# Patient Record
Sex: FEMALE | Race: WHITE | Employment: OTHER | URBAN - METROPOLITAN AREA
[De-identification: names, ages, dates, MRNs, and addresses within clinical notes are randomized per-mention and may not be internally consistent; named-entity substitution may affect disease eponyms.]

---

## 2019-05-15 ENCOUNTER — OFFICE VISIT (OUTPATIENT)
Dept: GASTROENTEROLOGY | Facility: CLINIC | Age: 74
End: 2019-05-15
Payer: COMMERCIAL

## 2019-05-15 ENCOUNTER — TELEPHONE (OUTPATIENT)
Dept: GASTROENTEROLOGY | Facility: CLINIC | Age: 74
End: 2019-05-15

## 2019-05-15 VITALS
HEART RATE: 86 BPM | BODY MASS INDEX: 34.85 KG/M2 | DIASTOLIC BLOOD PRESSURE: 52 MMHG | HEIGHT: 58 IN | SYSTOLIC BLOOD PRESSURE: 108 MMHG | WEIGHT: 166 LBS

## 2019-05-15 DIAGNOSIS — R10.9 LEFT SIDED ABDOMINAL PAIN: ICD-10-CM

## 2019-05-15 DIAGNOSIS — K21.9 GASTROESOPHAGEAL REFLUX DISEASE, ESOPHAGITIS PRESENCE NOT SPECIFIED: ICD-10-CM

## 2019-05-15 DIAGNOSIS — R10.9 LEFT SIDED ABDOMINAL PAIN: Primary | ICD-10-CM

## 2019-05-15 DIAGNOSIS — R74.8 ABNORMAL SERUM LEVEL OF ALKALINE PHOSPHATASE: ICD-10-CM

## 2019-05-15 DIAGNOSIS — R19.5 OCCULT BLOOD POSITIVE STOOL: ICD-10-CM

## 2019-05-15 DIAGNOSIS — R19.5 POSITIVE OCCULT STOOL BLOOD TEST: Primary | ICD-10-CM

## 2019-05-15 PROCEDURE — 99203 OFFICE O/P NEW LOW 30 MIN: CPT | Performed by: INTERNAL MEDICINE

## 2019-05-15 RX ORDER — TRAVOPROST 0.004 %
DROPS OPHTHALMIC (EYE)
Refills: 0 | COMMUNITY
Start: 2019-05-08

## 2019-05-15 RX ORDER — LISINOPRIL AND HYDROCHLOROTHIAZIDE 25; 20 MG/1; MG/1
TABLET ORAL
Refills: 0 | COMMUNITY
Start: 2018-12-18 | End: 2019-08-19

## 2019-05-15 RX ORDER — POLYETHYLENE GLYCOL 3350, SODIUM CHLORIDE, POTASSIUM CHLORIDE, SODIUM BICARBONATE, AND SODIUM SULFATE 240; 5.84; 2.98; 6.72; 22.72 G/4L; G/4L; G/4L; G/4L; G/4L
POWDER, FOR SOLUTION ORAL
Qty: 1 BOTTLE | Refills: 0 | Status: SHIPPED | OUTPATIENT
Start: 2019-05-15 | End: 2019-08-19 | Stop reason: ALTCHOICE

## 2019-05-15 NOTE — PROGRESS NOTES
HPI:    Patient ID: Roger Camarena is a 72year old woman with elevated BMI 34.7, history diabetes and dyslipidemia who is referred to us by Dr. Keith Bernabe of the [facility] practice regarding abdominal pain and occult blood in the stool.     Ms. Corona Comes 81 MG Oral Tab Take 81 mg by mouth daily.  Disp:  Rfl:    PEG 3350-KCl-NaBcb-NaCl-NaSulf (PEG 3350/ELECTROLYTES) 240 g Oral Recon Soln Take as directed according to colonoscopy instructions Disp: 1 Bottle Rfl: 0     Allergies:No Known Allergies  Imaging: No reviewed above. 2.  I recommended EGD and colonoscopy examination with possible biopsy, possible polypectomy. We discussed sedation options of conscious sedation versus MAC anesthesia, and agreed on MAC anesthesia.  We discussed the nature and risks of E

## 2019-05-15 NOTE — TELEPHONE ENCOUNTER
Scheduled for:  Colonoscopy 10118 / EGD 29492  Provider Name: Dr. Elida Mares  Date:  5/29/19  Location:  [address] [place] [address]  Sedation:  MAC  Time:  2:30 pm, arrival 1:30 pm  Prep: Golytely  Meds/Allergies Reconciled?:  Physician reviewed  Diagnosis with codes:  Positive occult

## 2019-05-15 NOTE — PATIENT INSTRUCTIONS
Schedule EGD & colonoscopy exam at [facility])/ KATHERINE    This patient IS? appropriate for the [facility] endoscopy center. Body mass index is 34.69 kg/m².     MAC anesthesia     Golytely (PEG) 4L bowel prep    Dx = o

## 2019-05-29 ENCOUNTER — ANESTHESIA EVENT (OUTPATIENT)
Dept: ENDOSCOPY | Age: 74
End: 2019-05-29
Attending: INTERNAL MEDICINE
Payer: MEDICARE

## 2019-05-29 ENCOUNTER — HOSPITAL ENCOUNTER (OUTPATIENT)
Age: 74
Setting detail: HOSPITAL OUTPATIENT SURGERY
Discharge: HOME | End: 2019-05-29
Attending: INTERNAL MEDICINE | Admitting: INTERNAL MEDICINE
Payer: MEDICARE

## 2019-05-29 ENCOUNTER — ANESTHESIA (OUTPATIENT)
Dept: ENDOSCOPY | Age: 74
End: 2019-05-29
Attending: INTERNAL MEDICINE
Payer: MEDICARE

## 2019-05-29 VITALS
DIASTOLIC BLOOD PRESSURE: 58 MMHG | OXYGEN SATURATION: 98 % | HEIGHT: 58 IN | RESPIRATION RATE: 14 BRPM | SYSTOLIC BLOOD PRESSURE: 106 MMHG | HEART RATE: 70 BPM | WEIGHT: 166 LBS | BODY MASS INDEX: 34.85 KG/M2

## 2019-05-29 DIAGNOSIS — K21.9 GASTROESOPHAGEAL REFLUX DISEASE, ESOPHAGITIS PRESENCE NOT SPECIFIED: ICD-10-CM

## 2019-05-29 DIAGNOSIS — R19.5 POSITIVE OCCULT STOOL BLOOD TEST: ICD-10-CM

## 2019-05-29 DIAGNOSIS — R10.9 LEFT SIDED ABDOMINAL PAIN: ICD-10-CM

## 2019-05-29 PROCEDURE — 45385 COLONOSCOPY W/LESION REMOVAL: CPT | Performed by: INTERNAL MEDICINE

## 2019-05-29 PROCEDURE — 43239 EGD BIOPSY SINGLE/MULTIPLE: CPT | Performed by: INTERNAL MEDICINE

## 2019-05-29 PROCEDURE — 99070 SPECIAL SUPPLIES PHYS/QHP: CPT | Performed by: INTERNAL MEDICINE

## 2019-05-29 RX ORDER — LIDOCAINE HYDROCHLORIDE 10 MG/ML
INJECTION, SOLUTION EPIDURAL; INFILTRATION; INTRACAUDAL; PERINEURAL AS NEEDED
Status: DISCONTINUED | OUTPATIENT
Start: 2019-05-29 | End: 2019-05-29 | Stop reason: SURG

## 2019-05-29 RX ORDER — DEXTROSE MONOHYDRATE 25 G/50ML
50 INJECTION, SOLUTION INTRAVENOUS
Status: DISCONTINUED | OUTPATIENT
Start: 2019-05-29 | End: 2019-07-18

## 2019-05-29 RX ORDER — SODIUM CHLORIDE, SODIUM LACTATE, POTASSIUM CHLORIDE, CALCIUM CHLORIDE 600; 310; 30; 20 MG/100ML; MG/100ML; MG/100ML; MG/100ML
INJECTION, SOLUTION INTRAVENOUS CONTINUOUS
Status: DISCONTINUED | OUTPATIENT
Start: 2019-05-29 | End: 2019-07-18

## 2019-05-29 RX ORDER — NALOXONE HYDROCHLORIDE 0.4 MG/ML
80 INJECTION, SOLUTION INTRAMUSCULAR; INTRAVENOUS; SUBCUTANEOUS AS NEEDED
Status: DISCONTINUED | OUTPATIENT
Start: 2019-05-29 | End: 2019-05-30

## 2019-05-29 RX ADMIN — SODIUM CHLORIDE, SODIUM LACTATE, POTASSIUM CHLORIDE, CALCIUM CHLORIDE: 600; 310; 30; 20 INJECTION, SOLUTION INTRAVENOUS at 16:09:00

## 2019-05-29 RX ADMIN — SODIUM CHLORIDE, SODIUM LACTATE, POTASSIUM CHLORIDE, CALCIUM CHLORIDE: 600; 310; 30; 20 INJECTION, SOLUTION INTRAVENOUS at 15:24:00

## 2019-05-29 RX ADMIN — LIDOCAINE HYDROCHLORIDE 50 MG: 10 INJECTION, SOLUTION EPIDURAL; INFILTRATION; INTRACAUDAL; PERINEURAL at 15:30:00

## 2019-05-29 NOTE — ANESTHESIA POSTPROCEDURE EVALUATION
Patient: Linda Rai    Procedure Summary     Date:  05/29/19 Room / Location:  [facility] ENDOSCOPY 01 / [facility] ENDO    Anesthesia Start:  5156 Anesthesia Stop:  4064    Procedures:       COLONOSCOPY (N/A )      ESOPHAGOGASTRODUODENOSCOPY (EGD) (N/A ) Anna

## 2019-05-29 NOTE — OPERATIVE REPORT
EGD AND COLONOSCOPY PROCEDURE REPORTS:    DATE OF PROCEDURE:  5/29/2019    PCP: Sam Harper MD     PREOPERATIVE DIAGNOSIS:  occult blood in stool, GERD, L sided abdominal pain     POSTOPERATIVE DIAGNOSIS:  See impression. SURGEON:  Blade Ko peristalsis. Cecum was confirmed by landmarks, including appendiceal orifice, cecal strap, ileocecal valve. Retroflexion was performed in the rectum. The quality of the prep was good.   Exam of a ascending and descending, sigmoid colon limited by fairl

## 2019-05-29 NOTE — H&P
History & Physical Examination    Patient Name: Beverly Jaime  MRN: [id number]  [facility]: [id number]  YOB: 1945    Diagnosis: occult blood in stool, GERD, L sided abdominal pain    Present Illness:     72year old woman with elevated BMI 34.7, aspirin 81 MG Oral Tab Take 81 mg by mouth daily.  Disp:  Rfl:  5/28/2019   TRAVATAN Z 0.004 % Ophthalmic Solution INT 1 GTT OU QD HS Disp:  Rfl: 0 5/28/2019   PEG 3350-KCl-NaBcb-NaCl-NaSulf (PEG 3350/ELECTROLYTES) 240 g Oral Recon Soln Take as directed a

## 2019-05-29 NOTE — ANESTHESIA PREPROCEDURE EVALUATION
Anesthesia PreOp Note    HPI:     Barron Germain is a 72year old female who presents for preoperative consultation requested by: Aparna Peralta MD    Date of Surgery: 5/29/2019    Procedure(s):  COLONOSCOPY  ESOPHAGOGASTRODUODENOSCOPY (EGD) Spouse name: Not on file      Number of children: Not on file      Years of education: Not on file      Highest education level: Not on file    Occupational History      Not on file    Social Needs      Financial resource strain: Not on file      Food Mallampati: II  Dental          Pulmonary     breath sounds clear to auscultation  Cardiovascular   (+) hypertension well controlled,     Rhythm: regular  Rate: normal    Neuro/Psych - negative ROS     GI/Hepatic/Renal      Endo/Other    (+) diabetes melli

## 2019-07-01 ENCOUNTER — TELEPHONE (OUTPATIENT)
Dept: GASTROENTEROLOGY | Facility: CLINIC | Age: 74
End: 2019-07-01

## 2019-07-01 NOTE — TELEPHONE ENCOUNTER
Recall colon in 5 years 5/29/24  Per . Colon done 5/29/19  Lab letter mailed to pt per Dr.CB Zachery Armas MD  [facility] Clinical Staff             GI RNs - 1.  Please print and mail this letter to patient; 2. Recall for colonoscopy exam in

## 2019-08-19 ENCOUNTER — OFFICE VISIT (OUTPATIENT)
Dept: INTERNAL MEDICINE CLINIC | Facility: CLINIC | Age: 74
End: 2019-08-19
Payer: COMMERCIAL

## 2019-08-19 VITALS
RESPIRATION RATE: 17 BRPM | HEART RATE: 70 BPM | DIASTOLIC BLOOD PRESSURE: 58 MMHG | HEIGHT: 58 IN | OXYGEN SATURATION: 96 % | WEIGHT: 164 LBS | SYSTOLIC BLOOD PRESSURE: 104 MMHG | BODY MASS INDEX: 34.43 KG/M2

## 2019-08-19 DIAGNOSIS — Z00.00 ANNUAL PHYSICAL EXAM: Primary | ICD-10-CM

## 2019-08-19 DIAGNOSIS — K57.30 DIVERTICULOSIS OF COLON: ICD-10-CM

## 2019-08-19 DIAGNOSIS — I10 ESSENTIAL HYPERTENSION: ICD-10-CM

## 2019-08-19 DIAGNOSIS — Z90.710 HISTORY OF TOTAL HYSTERECTOMY: ICD-10-CM

## 2019-08-19 DIAGNOSIS — M54.32 SCIATICA OF LEFT SIDE: ICD-10-CM

## 2019-08-19 DIAGNOSIS — E66.9 CLASS 1 OBESITY WITH SERIOUS COMORBIDITY AND BODY MASS INDEX (BMI) OF 34.0 TO 34.9 IN ADULT, UNSPECIFIED OBESITY TYPE: ICD-10-CM

## 2019-08-19 DIAGNOSIS — E11.9 TYPE 2 DIABETES MELLITUS WITHOUT COMPLICATION, WITHOUT LONG-TERM CURRENT USE OF INSULIN (HCC): ICD-10-CM

## 2019-08-19 DIAGNOSIS — H40.9 GLAUCOMA OF BOTH EYES, UNSPECIFIED GLAUCOMA TYPE: ICD-10-CM

## 2019-08-19 PROBLEM — E66.811 CLASS 1 OBESITY WITH SERIOUS COMORBIDITY AND BODY MASS INDEX (BMI) OF 34.0 TO 34.9 IN ADULT: Status: ACTIVE | Noted: 2019-08-19

## 2019-08-19 PROCEDURE — 96160 PT-FOCUSED HLTH RISK ASSMT: CPT | Performed by: FAMILY MEDICINE

## 2019-08-19 PROCEDURE — 99397 PER PM REEVAL EST PAT 65+ YR: CPT | Performed by: FAMILY MEDICINE

## 2019-08-19 PROCEDURE — G0439 PPPS, SUBSEQ VISIT: HCPCS | Performed by: FAMILY MEDICINE

## 2019-08-19 RX ORDER — ATORVASTATIN CALCIUM 10 MG/1
10 TABLET, FILM COATED ORAL NIGHTLY
Qty: 90 TABLET | Refills: 3 | Status: SHIPPED | OUTPATIENT
Start: 2019-08-19 | End: 2020-09-18

## 2019-08-19 RX ORDER — LISINOPRIL AND HYDROCHLOROTHIAZIDE 25; 20 MG/1; MG/1
TABLET ORAL
Qty: 90 TABLET | Refills: 3 | Status: SHIPPED | OUTPATIENT
Start: 2019-08-19 | End: 2020-09-18

## 2019-08-20 ENCOUNTER — NURSE ONLY (OUTPATIENT)
Dept: INTERNAL MEDICINE CLINIC | Facility: CLINIC | Age: 74
End: 2019-08-20
Payer: COMMERCIAL

## 2019-08-20 PROCEDURE — 36415 COLL VENOUS BLD VENIPUNCTURE: CPT | Performed by: FAMILY MEDICINE

## 2019-08-20 NOTE — PROGRESS NOTES
HPI:   Grazyna Sapp is a 69year old female who presents for a MA Supervisit.     Patient Active Problem List:     Type 2 diabetes mellitus without complication, without long-term current use of insulin ([facility] [address]     Essential hypertension     Glaucoma o all    Feeling down, depressed, or hopeless (over the last two weeks)?: Not at all    PHQ-2 SCORE: 0        Advance Directives     Do you have a healthcare power of ?: No    Do you have a living will?: No   Was Medicare Assessment Questionnaire com YES      REVIEW OF SYSTEMS:   GENERAL: feels well otherwise  SKIN: denies any unusual skin lesions  EYES: denies blurred vision or double vision  HEENT: denies nasal congestion, sinus pain or ST  LUNGS: denies shortness of breath with exertion  CARDIOVASCU CONDITIONS:   Tomi Nicholson is a 69year old female who presents for a Medicare Assessment. PLAN SUMMARY:   Diagnoses and all orders for this visit:    Annual physical exam  -     COMP METABOLIC PANEL (14); Future  -     LIPID PANEL;  Future  - on 05/29/2024 Update Health Maintenance if applicable    Flex Sigmoidoscopy Screen every 10 years No results found for this or any previous visit. No flowsheet data found. Fecal Occult Blood Annually No results found for: FOB No flowsheet data found. No flowsheet data found. Creat/alb ratio  Annually      LDL  Annually No results found for: LDL, LDLC No flowsheet data found. Dilated Eye exam  Annually No flowsheet data found. No flowsheet data found.     COPD      Spirometry Testing Annually No

## 2019-08-21 LAB
ALBUMIN/GLOBULIN RATIO: 1.4 (CALC) (ref 1–2.5)
ALBUMIN: 4.2 G/DL (ref 3.6–5.1)
ALKALINE PHOSPHATASE: 109 U/L (ref 33–130)
ALT: 27 U/L (ref 6–29)
AST: 34 U/L (ref 10–35)
BILIRUBIN, TOTAL: 0.6 MG/DL (ref 0.2–1.2)
BUN: 21 MG/DL (ref 7–25)
CALCIUM: 9.9 MG/DL (ref 8.6–10.4)
CARBON DIOXIDE: 29 MMOL/L (ref 20–32)
CHLORIDE: 103 MMOL/L (ref 98–110)
CHOL/HDLC RATIO: 2.2 (CALC)
CHOLESTEROL, TOTAL: 135 MG/DL
CREATININE: 0.91 MG/DL (ref 0.6–0.93)
EGFR IF AFRICN AM: 73 ML/MIN/1.73M2
EGFR IF NONAFRICN AM: 63 ML/MIN/1.73M2
GLOBULIN: 3.1 G/DL (CALC) (ref 1.9–3.7)
GLUCOSE: 135 MG/DL (ref 65–99)
HDL CHOLESTEROL: 62 MG/DL
HEMOGLOBIN A1C: 6.8 % OF TOTAL HGB
LDL-CHOLESTEROL: 50 MG/DL (CALC)
NON-HDL CHOLESTEROL: 73 MG/DL (CALC)
POTASSIUM: 5 MMOL/L (ref 3.5–5.3)
PROTEIN, TOTAL: 7.3 G/DL (ref 6.1–8.1)
SODIUM: 141 MMOL/L (ref 135–146)
TRIGLYCERIDES: 149 MG/DL
TSH W/REFLEX TO FT4: 1.66 MIU/L (ref 0.4–4.5)

## 2020-01-30 ENCOUNTER — OFFICE VISIT (OUTPATIENT)
Dept: INTERNAL MEDICINE CLINIC | Facility: CLINIC | Age: 75
End: 2020-01-30
Payer: COMMERCIAL

## 2020-01-30 VITALS
BODY MASS INDEX: 33.67 KG/M2 | SYSTOLIC BLOOD PRESSURE: 126 MMHG | DIASTOLIC BLOOD PRESSURE: 68 MMHG | WEIGHT: 160.38 LBS | HEIGHT: 58 IN | OXYGEN SATURATION: 98 % | HEART RATE: 64 BPM

## 2020-01-30 DIAGNOSIS — E11.9 TYPE 2 DIABETES MELLITUS WITHOUT COMPLICATION, WITHOUT LONG-TERM CURRENT USE OF INSULIN (HCC): Primary | ICD-10-CM

## 2020-01-30 DIAGNOSIS — M54.13 RADICULOPATHY OF CERVICOTHORACIC REGION: ICD-10-CM

## 2020-01-30 DIAGNOSIS — H40.9 GLAUCOMA OF BOTH EYES, UNSPECIFIED GLAUCOMA TYPE: ICD-10-CM

## 2020-01-30 DIAGNOSIS — I10 ESSENTIAL HYPERTENSION: ICD-10-CM

## 2020-01-30 DIAGNOSIS — Z23 NEED FOR SHINGLES VACCINE: ICD-10-CM

## 2020-01-30 DIAGNOSIS — M15.9 PRIMARY OSTEOARTHRITIS INVOLVING MULTIPLE JOINTS: ICD-10-CM

## 2020-01-30 PROBLEM — M15.0 PRIMARY OSTEOARTHRITIS INVOLVING MULTIPLE JOINTS: Status: ACTIVE | Noted: 2020-01-30

## 2020-01-30 PROCEDURE — 90750 HZV VACC RECOMBINANT IM: CPT | Performed by: FAMILY MEDICINE

## 2020-01-30 PROCEDURE — 90471 IMMUNIZATION ADMIN: CPT | Performed by: FAMILY MEDICINE

## 2020-01-30 PROCEDURE — 99214 OFFICE O/P EST MOD 30 MIN: CPT | Performed by: FAMILY MEDICINE

## 2020-01-30 NOTE — PROGRESS NOTES
HPI:   Mitchell Lauren is a 69year old female who presents for recheck of her diabetes. DM dx at age:  @ 63 Y. O.  Current medications:   Current Outpatient Medications   Medication Sig Dispense Refill   •       • Lisinopril-hydroCHLOROthiazide 20-25 MG FEET.  FORMER LEFT SIDED SCIATICA RESOLVED BUT RESIDUAL LEFT SIDED UPPER ABDOMINAL PAIN OFF AND ON (SHARP/SHOOTING)  MSKL:  WRIST/HAND OA BETTER WITH COPPER JEWELRY      EXAM:   /68   Pulse 64   Ht 58\"   Wt 160 lb 6.4 oz (72.8 kg)   SpO2 98%   BMI 3 of these issues and agrees to the plan. The patient is asked to return in      .   Orders Placed This Encounter      Comp Metabolic Panel (14) [E]      Hemoglobin A1C (Glycohemoglobin) [E]      Lipid Panel [E]      Zoster Recombinant Adjuvanted [Shingrix -

## 2020-01-31 LAB
ALBUMIN/GLOBULIN RATIO: 1.3 (CALC) (ref 1–2.5)
ALBUMIN: 4.3 G/DL (ref 3.6–5.1)
ALKALINE PHOSPHATASE: 100 U/L (ref 33–130)
ALT: 27 U/L (ref 6–29)
AST: 33 U/L (ref 10–35)
BILIRUBIN, TOTAL: 0.6 MG/DL (ref 0.2–1.2)
BUN/CREATININE RATIO: 28 (CALC) (ref 6–22)
BUN: 29 MG/DL (ref 7–25)
CALCIUM: 9.9 MG/DL (ref 8.6–10.4)
CARBON DIOXIDE: 29 MMOL/L (ref 20–32)
CHLORIDE: 103 MMOL/L (ref 98–110)
CHOL/HDLC RATIO: 2.1 (CALC)
CHOLESTEROL, TOTAL: 114 MG/DL
CREATININE: 1.03 MG/DL (ref 0.6–0.93)
EGFR IF AFRICN AM: 63 ML/MIN/1.73M2
EGFR IF NONAFRICN AM: 54 ML/MIN/1.73M2
GLOBULIN: 3.4 G/DL (CALC) (ref 1.9–3.7)
GLUCOSE: 127 MG/DL (ref 65–99)
HDL CHOLESTEROL: 55 MG/DL
HEMOGLOBIN A1C: 7 % OF TOTAL HGB
LDL-CHOLESTEROL: 37 MG/DL (CALC)
NON-HDL CHOLESTEROL: 59 MG/DL (CALC)
POTASSIUM: 4.8 MMOL/L (ref 3.5–5.3)
PROTEIN, TOTAL: 7.7 G/DL (ref 6.1–8.1)
SODIUM: 140 MMOL/L (ref 135–146)
TRIGLYCERIDES: 140 MG/DL

## 2020-06-16 ENCOUNTER — TELEPHONE (OUTPATIENT)
Dept: INTERNAL MEDICINE CLINIC | Facility: CLINIC | Age: 75
End: 2020-06-16

## 2020-06-16 DIAGNOSIS — Z12.31 ENCOUNTER FOR SCREENING MAMMOGRAM FOR BREAST CANCER: ICD-10-CM

## 2020-06-16 DIAGNOSIS — E11.9 TYPE 2 DIABETES MELLITUS WITHOUT COMPLICATION, WITHOUT LONG-TERM CURRENT USE OF INSULIN (HCC): Primary | ICD-10-CM

## 2020-06-16 NOTE — TELEPHONE ENCOUNTER
Patient is looking for mammogram order, she is also wanting to get labs done before she has an appointment in July. Orders entered, she will get labs done July 25.

## 2020-07-19 ENCOUNTER — HOSPITAL ENCOUNTER (OUTPATIENT)
Age: 75
Discharge: ANOTHER HOSPITAL | End: 2020-07-19
Attending: EMERGENCY MEDICINE
Payer: MEDICARE

## 2020-07-19 ENCOUNTER — APPOINTMENT (OUTPATIENT)
Dept: CT IMAGING | Facility: HOSPITAL | Age: 75
End: 2020-07-19
Attending: EMERGENCY MEDICINE
Payer: MEDICARE

## 2020-07-19 ENCOUNTER — HOSPITAL ENCOUNTER (OUTPATIENT)
Facility: HOSPITAL | Age: 75
Setting detail: OBSERVATION
LOS: 2 days | Discharge: HOME | End: 2020-07-21
Attending: EMERGENCY MEDICINE | Admitting: HOSPITALIST
Payer: MEDICARE

## 2020-07-19 VITALS
OXYGEN SATURATION: 97 % | BODY MASS INDEX: 27.66 KG/M2 | SYSTOLIC BLOOD PRESSURE: 126 MMHG | WEIGHT: 162 LBS | TEMPERATURE: 98 F | HEART RATE: 74 BPM | HEIGHT: 64 IN | RESPIRATION RATE: 20 BRPM | DIASTOLIC BLOOD PRESSURE: 37 MMHG

## 2020-07-19 DIAGNOSIS — R10.9 FLANK PAIN: ICD-10-CM

## 2020-07-19 DIAGNOSIS — R10.9 ABDOMINAL PAIN, ACUTE: Primary | ICD-10-CM

## 2020-07-19 DIAGNOSIS — K92.2 ACUTE LOWER GASTROINTESTINAL BLEEDING: Primary | ICD-10-CM

## 2020-07-19 DIAGNOSIS — K92.2 LOWER GI BLEEDING: ICD-10-CM

## 2020-07-19 PROCEDURE — 81002 URINALYSIS NONAUTO W/O SCOPE: CPT | Performed by: EMERGENCY MEDICINE

## 2020-07-19 PROCEDURE — 99222 1ST HOSP IP/OBS MODERATE 55: CPT | Performed by: HOSPITALIST

## 2020-07-19 PROCEDURE — 99204 OFFICE O/P NEW MOD 45 MIN: CPT | Performed by: EMERGENCY MEDICINE

## 2020-07-19 PROCEDURE — 74177 CT ABD & PELVIS W/CONTRAST: CPT | Performed by: EMERGENCY MEDICINE

## 2020-07-19 RX ORDER — HYDROCODONE BITARTRATE AND ACETAMINOPHEN 5; 325 MG/1; MG/1
2 TABLET ORAL EVERY 4 HOURS PRN
Status: DISCONTINUED | OUTPATIENT
Start: 2020-07-19 | End: 2020-07-21

## 2020-07-19 RX ORDER — ONDANSETRON 2 MG/ML
4 INJECTION INTRAMUSCULAR; INTRAVENOUS EVERY 6 HOURS PRN
Status: DISCONTINUED | OUTPATIENT
Start: 2020-07-19 | End: 2020-07-19

## 2020-07-19 RX ORDER — HYDROCODONE BITARTRATE AND ACETAMINOPHEN 5; 325 MG/1; MG/1
1 TABLET ORAL EVERY 4 HOURS PRN
Status: DISCONTINUED | OUTPATIENT
Start: 2020-07-19 | End: 2020-07-21

## 2020-07-19 RX ORDER — ACETAMINOPHEN 325 MG/1
650 TABLET ORAL EVERY 4 HOURS PRN
Status: DISCONTINUED | OUTPATIENT
Start: 2020-07-19 | End: 2020-07-21

## 2020-07-19 RX ORDER — SODIUM CHLORIDE 9 MG/ML
INJECTION, SOLUTION INTRAVENOUS CONTINUOUS
Status: DISCONTINUED | OUTPATIENT
Start: 2020-07-19 | End: 2020-07-21

## 2020-07-19 RX ORDER — DEXTROSE MONOHYDRATE 25 G/50ML
50 INJECTION, SOLUTION INTRAVENOUS
Status: DISCONTINUED | OUTPATIENT
Start: 2020-07-19 | End: 2020-07-21

## 2020-07-19 RX ORDER — PANTOPRAZOLE SODIUM 40 MG/1
40 TABLET, DELAYED RELEASE ORAL
Status: DISCONTINUED | OUTPATIENT
Start: 2020-07-20 | End: 2020-07-19

## 2020-07-19 RX ORDER — ONDANSETRON 2 MG/ML
4 INJECTION INTRAMUSCULAR; INTRAVENOUS EVERY 6 HOURS PRN
Status: DISCONTINUED | OUTPATIENT
Start: 2020-07-19 | End: 2020-07-21

## 2020-07-19 RX ORDER — HYDRALAZINE HYDROCHLORIDE 20 MG/ML
10 INJECTION INTRAMUSCULAR; INTRAVENOUS EVERY 4 HOURS PRN
Status: DISCONTINUED | OUTPATIENT
Start: 2020-07-19 | End: 2020-07-21

## 2020-07-19 RX ORDER — METOCLOPRAMIDE HYDROCHLORIDE 5 MG/ML
10 INJECTION INTRAMUSCULAR; INTRAVENOUS EVERY 8 HOURS PRN
Status: DISCONTINUED | OUTPATIENT
Start: 2020-07-19 | End: 2020-07-21

## 2020-07-19 RX ORDER — LATANOPROST 50 UG/ML
1 SOLUTION/ DROPS OPHTHALMIC NIGHTLY
Status: DISCONTINUED | OUTPATIENT
Start: 2020-07-19 | End: 2020-07-21

## 2020-07-19 NOTE — PLAN OF CARE
Problem: Diabetes/Glucose Control  Goal: Glucose maintained within prescribed range  Description  INTERVENTIONS:  - Monitor Blood Glucose as ordered  - Assess for signs and symptoms of hyperglycemia and hypoglycemia  - Administer ordered medications to m RN  Outcome: Progressing     Problem: HEMATOLOGIC - ADULT  Goal: Free from bleeding injury  Description  (Example usage: patient with low platelets)  INTERVENTIONS:  - Avoid intramuscular injections, enemas and rectal medication administration  - Ensure sa

## 2020-07-19 NOTE — H&P
[facility] Patient Status:  Emergency    1945 MRN [id number]   Location [address] Attending [address]  Ava Radford Day # 0 PCP Paolo Gilmore, DO     Date temperature 98.1 °F (36.7 °C), temperature source Oral, resp. rate 18, height 4' 10\" (1.473 m), weight 162 lb (73.5 kg), SpO2 96 %. GENERAL:  The patient appeared to be in no distress. Lying in bed, comfortably.   SKIN:  Warm and hydrated  PSYCHIATRIC Evens Dias MD on 7/19/2020 at 2:50 PM     Finalized by (CST): Evens Dias MD on 7/19/2020 at 3:00 PM                Assessment/Plan:       Acute lower gastrointestinal bleeding  -hb. 12.  On admission  -plan monitor for blood loss  -repeat

## 2020-07-19 NOTE — ED NOTES
Orders for admission, patient is aware of plan and ready to go upstairs.  Any questions, please call ED RN kenyatta  at extension [phone]

## 2020-07-19 NOTE — ED INITIAL ASSESSMENT (HPI)
Pt reports blood in stool intermittently x 4 months. Pt states \"the blood is in between light and dark. \" reports constant abdominal pain.

## 2020-07-19 NOTE — ED PROVIDER NOTES
Patient Seen in: [facility] Immediate Care In [address] [place] [address]      History   Patient presents with:  Anal Problem    Stated Complaint: blood in urine     HPI  Patient states she has had left flank and abdominal pain on and off for some time but pain got Exam  Vitals signs and nursing note reviewed. Constitutional:       General: She is not in acute distress. Appearance: She is well-developed. Comments: Well appearing   HENT:      Head: Normocephalic and atraumatic.       Right Ear: External ear

## 2020-07-19 NOTE — ED INITIAL ASSESSMENT (HPI)
Blood in stool for the past 3 months with abd pain and n/v pt also reports blood in vomit. Denies blood thinners.

## 2020-07-19 NOTE — ED PROVIDER NOTES
Patient Seen in: [facility] AND [facility] Emergency Department      History   Patient presents with:  GI Bleeding    Stated Complaint: rectal bleeding    HPI    45-year-old female presents for complaint of abdominal pain and bright red blood per rectum.   Patien noted above.     Physical Exam     ED Triage Vitals [07/19/20 1226]   /74   Pulse 85   Resp 18   Temp 98.1 °F (36.7 °C)   Temp src Oral   SpO2 98 %   O2 Device None (Room air)       Current:/57   Pulse 73   Temp 98.1 °F (36.7 °C) (Oral)   Resp 1 -American 64 (*)     All other components within normal limits   CBC W/ DIFFERENTIAL - Abnormal; Notable for the following components:    RDW-SD 46.4 (*)     All other components within normal limits   CBC WITH DIFFERENTIAL WITH PLATELET    Mahin Jennie non-ionic intravenous contrast material.  Automated exposure control for dose reduction was used. Adjustment of the mA and/or kV was done based on the patient's size. Use of iterative reconstruction technique for dose reduction was used.   Dose information lesions. 3. Contracted gallbladder. No biliary dilatation. 4. Distended fluid-filled stomach with prominent mucosal thickening in the gastric fundus of the stomach stomach. Suspect Nonspecific gastritis/dyspepsia 5.  S-shaped scoliosis dorsal lumbar spi

## 2020-07-20 ENCOUNTER — APPOINTMENT (OUTPATIENT)
Dept: MRI IMAGING | Facility: HOSPITAL | Age: 75
End: 2020-07-20
Attending: HOSPITALIST
Payer: MEDICARE

## 2020-07-20 PROCEDURE — 99232 SBSQ HOSP IP/OBS MODERATE 35: CPT | Performed by: HOSPITALIST

## 2020-07-20 PROCEDURE — 72148 MRI LUMBAR SPINE W/O DYE: CPT | Performed by: HOSPITALIST

## 2020-07-20 PROCEDURE — 99213 OFFICE O/P EST LOW 20 MIN: CPT | Performed by: INTERNAL MEDICINE

## 2020-07-20 RX ORDER — PANTOPRAZOLE SODIUM 40 MG/1
40 TABLET, DELAYED RELEASE ORAL
Status: DISCONTINUED | OUTPATIENT
Start: 2020-07-21 | End: 2020-07-21

## 2020-07-20 NOTE — PLAN OF CARE
Problem: Diabetes/Glucose Control  Goal: Glucose maintained within prescribed range  Description  INTERVENTIONS:  - Monitor Blood Glucose as ordered  - Assess for signs and symptoms of hyperglycemia and hypoglycemia  - Administer ordered medications to m Advance diet as tolerated, if ordered  - Obtain nutritional consult as needed  - Evaluate fluid balance  Outcome: Progressing  Goal: Maintains or returns to baseline bowel function  Description  INTERVENTIONS:  - Assess bowel function  - Maintain adequate and pain management  - Manage/alleviate anxiety  - Utilize distraction and/or relaxation techniques  - Monitor for opioid side effects  - Notify MD/LIP if interventions unsuccessful or patient reports new pain  - Anticipate increased pain with activity and maintained  Description  INTERVENTIONS:  - Monitor labs and assess for signs and symptoms of volume excess or deficit  - Monitor intake, output and patient weight  - Monitor urine specific gravity, serum osmolarity and serum sodium as indicated or ordered

## 2020-07-20 NOTE — PLAN OF CARE
Problem: Diabetes/Glucose Control  Goal: Glucose maintained within prescribed range  Description  INTERVENTIONS:  - Monitor Blood Glucose as ordered  - Assess for signs and symptoms of hyperglycemia and hypoglycemia  - Administer ordered medications to m Advance diet as tolerated, if ordered  - Obtain nutritional consult as needed  - Evaluate fluid balance  Outcome: Progressing  Goal: Maintains or returns to baseline bowel function  Description  INTERVENTIONS:  - Assess bowel function  - Maintain adequate appropriate and evaluate response  - Consider cultural and social influences on pain and pain management  - Manage/alleviate anxiety  - Utilize distraction and/or relaxation techniques  - Monitor for opioid side effects  - Notify MD/LIP if interventions un Progressing  Goal: Hemodynamic stability and optimal renal function maintained  Description  INTERVENTIONS:  - Monitor labs and assess for signs and symptoms of volume excess or deficit  - Monitor intake, output and patient weight  - Monitor urine specific

## 2020-07-20 NOTE — CONSULTS
[address] [address]   Gastroenterology Consultation Note    Jennifer Mcdaniel  Patient Status:    Inpatient  Date of Admission:         7/19/2020, Hospital day #1  Attending:   Leopold Mcalpine, MD  PCP:     Ede Cota DO    [facility], [facility] spondylolisthesis L5 relative to S1.  6. Moderate chronic wedge compression T12 vertebra    History:  Past Medical History:   Diagnosis Date   • Diabetes ([address]    • High blood pressure    • High cholesterol      Past Surgical History:   Procedure Lateralit Subcutaneous, TID CC  •  hydrALAzine HCl (APRESOLINE) injection 10 mg, 10 mg, Intravenous, Q4H PRN    Review of Systems:  CONSTITUTIONAL:  negative for fevers, rigors  EYES:  negative for diplopia   RESPIRATORY:  negative for severe shortness of breath  CA lumbar spine, most prominent at L5-S1, where there is severe narrowing of the right neural foramen with bony encroachment upon the exiting right L5 nerve root.   Chronic compression deformity of the T12 vertebral body with less than 25% loss of vertebral thom bleeding can consider repeat colonoscopy although. #Cirrhotic appearance of liver on imaging -I discussed the diagnosis with the patient, incidental finding on CT scan. She denies any alcohol use history. Denies any family history of liver disease.

## 2020-07-20 NOTE — PROGRESS NOTES
[facility]    Progress Note    Eva Ace Violeta Patient Status:  Inpatient    1945 MRN [id number]   Location [facility] 5SW/SE Attending Carlotta Patton MD   Hosp Day # 1 PCP Ruthie Valente DO       Subjective:   Kimberly Draper upon the exiting right L5 nerve root. Chronic compression deformity of the T12 vertebral body with less than 25% loss of vertebral body height. No acute fracture, dislocation or marrow replacing lesion within the lumbar spine.   No high-grade canal narrow s-shaped scoliosis of dorsal lumbar spine and ch. Ant. Spondylolisthesis L5-S1, T12 compression fracture, noted on ab. Ct  -this has been long standing  -plan MRI- lumbar spine, shows djd thruout lumbar spine, with severe narrowing of exiting rt.  L5 nerve

## 2020-07-21 VITALS
OXYGEN SATURATION: 99 % | HEIGHT: 58 IN | DIASTOLIC BLOOD PRESSURE: 49 MMHG | RESPIRATION RATE: 18 BRPM | WEIGHT: 166.81 LBS | HEART RATE: 63 BPM | BODY MASS INDEX: 35.01 KG/M2 | TEMPERATURE: 98 F | SYSTOLIC BLOOD PRESSURE: 126 MMHG

## 2020-07-21 PROCEDURE — 99213 OFFICE O/P EST LOW 20 MIN: CPT | Performed by: INTERNAL MEDICINE

## 2020-07-21 PROCEDURE — 99217 OBSERVATION CARE DISCHARGE: CPT | Performed by: HOSPITALIST

## 2020-07-21 NOTE — PROGRESS NOTES
[address] [address]     Gastroenterology Progress Note    Comfort Mcdaniel Patient Status:  Observation    1945 MRN [id number]   Location [facility] 5SW/SE Attending Katie Carl MD   [facility] Day # 2 PCP Hortencia Olvera DO alcohol use history. Denies any family history of liver disease. It seems that she does have some early signs of cirrhosis on imaging. Will check LFTs and INR.   We will start with a chronic liver disease work-up as well to rule out autoimmune disease an MD on 7/20/2020 at 9:50 AM     Finalized by (CST): Niles Handley MD on 7/20/2020 at 9:58 AM

## 2020-07-21 NOTE — PAYOR COMM NOTE
[facility]  OBS STATUS    --------------  ADMISSION REVIEW     Payor: [address] [place] [address] #:  [id number]  Authorization Number: [id number]

## 2020-07-21 NOTE — PLAN OF CARE
Problem: Diabetes/Glucose Control  Goal: Glucose maintained within prescribed range  Description  INTERVENTIONS:  - Monitor Blood Glucose as ordered  - Assess for signs and symptoms of hyperglycemia and hypoglycemia  - Administer ordered medications to m nonpharmacologic comfort measures as appropriate  - Advance diet as tolerated, if ordered  - Obtain nutritional consult as needed  - Evaluate fluid balance  Outcome: Adequate for Discharge  Goal: Maintains or returns to baseline bowel function  Description and severity of pain and evaluate response  - Implement non-pharmacological measures as appropriate and evaluate response  - Consider cultural and social influences on pain and pain management  - Manage/alleviate anxiety  - Utilize distraction and/or relax Fluid restriction as ordered  - Instruct patient on fluid and nutrition restrictions as appropriate  Outcome: Adequate for Discharge  Goal: Hemodynamic stability and optimal renal function maintained  Description  INTERVENTIONS:  - Monitor labs and assess

## 2020-07-22 ENCOUNTER — TELEPHONE (OUTPATIENT)
Dept: GASTROENTEROLOGY | Facility: CLINIC | Age: 75
End: 2020-07-22

## 2020-07-22 ENCOUNTER — PATIENT OUTREACH (OUTPATIENT)
Dept: CASE MANAGEMENT | Age: 75
End: 2020-07-22

## 2020-07-22 DIAGNOSIS — Z02.9 ENCOUNTERS FOR UNSPECIFIED ADMINISTRATIVE PURPOSE: ICD-10-CM

## 2020-07-22 NOTE — TELEPHONE ENCOUNTER
Patient discharged yesterday for acute on chronic anemia and hematochezia thought to be self-limited diverticular bleed vs hemorrhoids. When would you like to see her for follow up in clinic?

## 2020-07-23 ENCOUNTER — TELEPHONE (OUTPATIENT)
Dept: INTERNAL MEDICINE CLINIC | Facility: CLINIC | Age: 75
End: 2020-07-23

## 2020-07-23 NOTE — TELEPHONE ENCOUNTER
Patient was discharged home from [facility] on 7/21/2020 and is at High risk of readmission and recommended to have a TCM HFU by 7/28/2020 preferred or no later than 8/4/2020 or sooner. NCM's have attempted to reach patient unsuccessfully.     Triage

## 2020-07-23 NOTE — PROGRESS NOTES
NCM placed call to patient for TCM message received: The mailbox is full and cannot accept any messages at this time.    NCM sent message to PCP requesting they try to reach patient to schedule a TCM HFU by 7/28/2020 preferred or no later than 8/4/2020 or s

## 2020-07-23 NOTE — TELEPHONE ENCOUNTER
Noted.  My op report of 5/29/2019 reviewed. GI RNs, please call Ms Linnea Rincon and offer RN spot follow up visit with me in 2-4 weeks.

## 2020-07-24 NOTE — DISCHARGE SUMMARY
[facility]    PATIENT'S NAME: Jovanny Bernard PHYSICIAN: Tip Jones MD   PATIENT ACCOUNT#:   [id number]    LOCATION:  [address] [place] [address] RECORD #:   [id number]       YOB: 1945  ADMISSION DATE:       07/1 review regarding the patient's hospitalization, please refer to the patient's chart. PHYSICAL EXAMINATION:    VITAL SIGNS:  The patient's temperature is 98.2, pulse 63, respirations are 18, blood pressure 126/49, saturating 97% on room air.   GENERAL:  T

## 2020-07-29 NOTE — TELEPHONE ENCOUNTER
I spoke to the pt and we scheduled a f/u appt in [facility].  Date, time and location verifed    Future Appointments   Date Time Provider Gala Colon   7/31/2020  1:20 PM DO LINDA Smalls 4 RADHA Giraldo   8/1/2020 10:30 AM AMISHA 4 [facility] NURSE

## 2020-07-31 ENCOUNTER — OFFICE VISIT (OUTPATIENT)
Dept: INTERNAL MEDICINE CLINIC | Facility: CLINIC | Age: 75
End: 2020-07-31
Payer: COMMERCIAL

## 2020-07-31 VITALS
WEIGHT: 163 LBS | SYSTOLIC BLOOD PRESSURE: 102 MMHG | OXYGEN SATURATION: 98 % | DIASTOLIC BLOOD PRESSURE: 62 MMHG | BODY MASS INDEX: 34.22 KG/M2 | HEART RATE: 78 BPM | HEIGHT: 58 IN

## 2020-07-31 DIAGNOSIS — E11.9 TYPE 2 DIABETES MELLITUS WITHOUT COMPLICATION, WITHOUT LONG-TERM CURRENT USE OF INSULIN (HCC): ICD-10-CM

## 2020-07-31 DIAGNOSIS — Z09 HOSPITAL DISCHARGE FOLLOW-UP: Primary | ICD-10-CM

## 2020-07-31 DIAGNOSIS — K57.30 DIVERTICULOSIS OF COLON: ICD-10-CM

## 2020-07-31 DIAGNOSIS — I10 ESSENTIAL HYPERTENSION: ICD-10-CM

## 2020-07-31 DIAGNOSIS — Z87.19 HISTORY OF RECTAL BLEEDING: ICD-10-CM

## 2020-07-31 DIAGNOSIS — N18.30 CKD (CHRONIC KIDNEY DISEASE) STAGE 3, GFR 30-59 ML/MIN (HCC): ICD-10-CM

## 2020-07-31 DIAGNOSIS — K74.60 CIRRHOSIS OF LIVER WITHOUT ASCITES, UNSPECIFIED HEPATIC CIRRHOSIS TYPE (HCC): ICD-10-CM

## 2020-07-31 PROBLEM — M85.80 OSTEOPENIA: Status: ACTIVE | Noted: 2017-08-22

## 2020-07-31 LAB
CUVETTE LOT #: ABNORMAL NUMERIC
HEMOGLOBIN: 10.9 G/DL (ref 12–15)

## 2020-07-31 PROCEDURE — 3074F SYST BP LT 130 MM HG: CPT | Performed by: FAMILY MEDICINE

## 2020-07-31 PROCEDURE — 1111F DSCHRG MED/CURRENT MED MERGE: CPT | Performed by: FAMILY MEDICINE

## 2020-07-31 PROCEDURE — 3078F DIAST BP <80 MM HG: CPT | Performed by: FAMILY MEDICINE

## 2020-07-31 PROCEDURE — 99495 TRANSJ CARE MGMT MOD F2F 14D: CPT | Performed by: FAMILY MEDICINE

## 2020-07-31 PROCEDURE — 3008F BODY MASS INDEX DOCD: CPT | Performed by: FAMILY MEDICINE

## 2020-07-31 PROCEDURE — 85018 HEMOGLOBIN: CPT | Performed by: FAMILY MEDICINE

## 2020-07-31 NOTE — PROGRESS NOTES
HPI:    Diana Gomes is a 69year old female here today for hospital follow up.    She was discharged from Inpatient hospital[facility]  to Home   Admission Date: 7/19/20   Discharge Date: 7/21/20  Hospital Discharge Diagnoses (since 7/1/2020) Tab, TK 1 T PO D  metFORMIN HCl 1000 MG Oral Tab, Take 1 tablet (1,000 mg total) by mouth 2 (two) times daily with meals. atorvastatin 10 MG Oral Tab, Take 1 tablet (10 mg total) by mouth nightly.   TRAVATAN Z 0.004 % Ophthalmic Solution, INT 1 GTT OU QD H (73.9 kg)   SpO2 98%   BMI 34.07 kg/m²    GENERAL: well developed, OBESE, in no apparent distress  SKIN: no rashes, no suspicious lesions  HEENT: atraumatic, normocephalic, ears and throat are clear  EYES: PERRLA, EOMI, conjunctiva are clear  NECK: supple, ordered in this encounter       Imaging & Consults:  None    F/UP WITH GI 9/18 AS SCHEDULED    Transitional Care Management Certification:  I certify that the following are true:  Communication with the patient was made within 2 business days of discharge

## 2020-08-03 LAB
ALPHA FETOPROTEIN,$TUMOR MARKER: 5.7 NG/ML
CHOL/HDLC RATIO: 1.8 (CALC)
CHOLESTEROL, TOTAL: 123 MG/DL
HDL CHOLESTEROL: 67 MG/DL
LDL-CHOLESTEROL: 34 MG/DL (CALC)
NON-HDL CHOLESTEROL: 56 MG/DL (CALC)
SIGNAL TO CUT-OFF: 0.03
TRIGLYCERIDES: 137 MG/DL

## 2020-08-03 NOTE — PROGRESS NOTES
Multiple attempts made to reach the patient for hospital follow up, with no response or return call. Patient completed HFU on 7-31-20. [facility] closing encounter.

## 2020-09-01 ENCOUNTER — NURSE ONLY (OUTPATIENT)
Dept: INTERNAL MEDICINE CLINIC | Facility: CLINIC | Age: 75
End: 2020-09-01
Payer: COMMERCIAL

## 2020-09-01 DIAGNOSIS — I10 ESSENTIAL HYPERTENSION: ICD-10-CM

## 2020-09-01 DIAGNOSIS — K74.60 CIRRHOSIS OF LIVER WITHOUT ASCITES, UNSPECIFIED HEPATIC CIRRHOSIS TYPE (HCC): ICD-10-CM

## 2020-09-01 DIAGNOSIS — K92.2 ACUTE LOWER GASTROINTESTINAL BLEEDING: ICD-10-CM

## 2020-09-01 PROCEDURE — 36415 COLL VENOUS BLD VENIPUNCTURE: CPT | Performed by: FAMILY MEDICINE

## 2020-09-03 ENCOUNTER — NURSE ONLY (OUTPATIENT)
Dept: INTERNAL MEDICINE CLINIC | Facility: CLINIC | Age: 75
End: 2020-09-03
Payer: COMMERCIAL

## 2020-09-04 ENCOUNTER — OFFICE VISIT (OUTPATIENT)
Dept: GASTROENTEROLOGY | Facility: CLINIC | Age: 75
End: 2020-09-04
Payer: COMMERCIAL

## 2020-09-04 VITALS
DIASTOLIC BLOOD PRESSURE: 58 MMHG | HEIGHT: 58 IN | SYSTOLIC BLOOD PRESSURE: 96 MMHG | WEIGHT: 164 LBS | BODY MASS INDEX: 34.43 KG/M2 | HEART RATE: 89 BPM

## 2020-09-04 DIAGNOSIS — E11.9 TYPE 2 DIABETES MELLITUS WITHOUT COMPLICATION, WITHOUT LONG-TERM CURRENT USE OF INSULIN (HCC): Primary | ICD-10-CM

## 2020-09-04 DIAGNOSIS — R93.2 ABNORMAL CT OF LIVER: ICD-10-CM

## 2020-09-04 DIAGNOSIS — Z87.19 HISTORY OF RECTAL BLEEDING: ICD-10-CM

## 2020-09-04 DIAGNOSIS — K57.30 DIVERTICULOSIS OF COLON: Primary | ICD-10-CM

## 2020-09-04 DIAGNOSIS — R10.12 LUQ ABDOMINAL PAIN: ICD-10-CM

## 2020-09-04 PROCEDURE — 99214 OFFICE O/P EST MOD 30 MIN: CPT | Performed by: INTERNAL MEDICINE

## 2020-09-04 PROCEDURE — 3078F DIAST BP <80 MM HG: CPT | Performed by: INTERNAL MEDICINE

## 2020-09-04 PROCEDURE — G0463 HOSPITAL OUTPT CLINIC VISIT: HCPCS | Performed by: INTERNAL MEDICINE

## 2020-09-04 PROCEDURE — 3074F SYST BP LT 130 MM HG: CPT | Performed by: INTERNAL MEDICINE

## 2020-09-04 PROCEDURE — 3008F BODY MASS INDEX DOCD: CPT | Performed by: INTERNAL MEDICINE

## 2020-09-04 RX ORDER — ONDANSETRON 4 MG/1
4 TABLET, FILM COATED ORAL EVERY 8 HOURS PRN
Qty: 30 TABLET | Refills: 2 | Status: SHIPPED | OUTPATIENT
Start: 2020-09-04 | End: 2021-02-17 | Stop reason: ALTCHOICE

## 2020-09-04 NOTE — PROGRESS NOTES
HPI:    Patient ID: Tomi Nicholson presents today for follow-up after recent brief hospitalization 7/19/2020–7/21/2020. Ms. Judit Pedersen presented to the ED complaining of several episodes of painless hematochezia.   She had a moderate acute blood loss dyslipidemia who is referred to us by Dr. Jenny Brand of the [facility] practice regarding abdominal pain and occult blood in the stool. Ms. Coral Shea begins the interview describing a 1-1.5-year history of left-sided abdominal pain.   She describes vomiting. Hematemesis/blood in vomit. Denies blood thinners. FINDINGS:          LIVER:  Macronodular liver margin with smaller right lobe suggesting cirrhotic morphology.   GALLBLADDER:            Contracted gallbladder  BILIARY:            No visibl stomach stomach. Suspect Nonspecific gastritis/dyspepsia  5. S-shaped scoliosis dorsal lumbar spine.   Chronic anterior spondylolisthesis L5 relative to S1.  6. Moderate chronic wedge compression T12 vertebra           Dictated by (CST): Zbigniew Saavedra sedation given. Digital rectal exam was performed, which showed no masses. The Olympus pediatric video colonoscope was advanced under direct visualization through the entire length of the colon to the cecum and terminal ileum.   Unable to retroflex in the seen on Giemsa stain (with appropriate control reactivity).     B. Sigmoid colon polyp; polypectomy:  · Fragment of tubular adenoma.          Current Outpatient Medications   Medication Sig Dispense Refill   • Lisinopril-hydroCHLOROthiazide 20-25 MG Oral T pain and occult blood in the stool. Now following with Dr. Hortencia Olvera. Ms. Charlene Thompson initially described in May 2019 a 1-1.5-year history of left-sided abdominal pain.   She describes a fairly constant mid left abdominal pain which sometimes radiates a 120 on 7/20/2020.   · Previously elevated alk phosphatase 2019 labs copied above  · Hepatitis B and C serologies negative 7/31/2020  · AFP tumor marker 5.7 on 7/31/2020  · Normal total bilirubin 0.4 on 7/20/2020  · Normal serum albumin 4.3 on 1/30/2020    4

## 2020-09-05 LAB
ALBUMIN/GLOBULIN RATIO: 1.2 (CALC) (ref 1–2.5)
ALBUMIN: 4.1 G/DL (ref 3.6–5.1)
ALKALINE PHOSPHATASE: 133 U/L
ALT: 26 U/L
AST: 35 U/L (ref 10–35)
BILIRUBIN, TOTAL: 0.4 MG/DL (ref 0.2–1.2)
BUN/CREATININE RATIO: 29 (CALC) (ref 6–22)
BUN: 27 MG/DL (ref 7–25)
CALCIUM: 9.9 MG/DL
CARBON DIOXIDE: 25 MMOL/L (ref 20–32)
CHLORIDE: 105 MMOL/L (ref 98–110)
CREATININE: 0.94 MG/DL
GLOBULIN: 3.3 G/DL (CALC) (ref 1.9–3.7)
GLUCOSE: 143 MG/DL (ref 65–99)
HEMOGLOBIN A1C: 6.6 % OF TOTAL HGB
POTASSIUM: 4.8 MMOL/L (ref 3.5–5.3)
PROTEIN, TOTAL: 7.4 G/DL (ref 6.1–8.1)
SODIUM: 139 MMOL/L (ref 135–146)

## 2020-09-07 LAB
CREATININE, RANDOM URINE: 44 MG/DL (ref 20–275)
MICROALBUMIN/CREATININE RATIO, RANDOM URINE: 14 MCG/MG CREAT
MICROALBUMIN: 0.6 MG/DL

## 2020-09-08 ENCOUNTER — TELEPHONE (OUTPATIENT)
Dept: GASTROENTEROLOGY | Facility: CLINIC | Age: 75
End: 2020-09-08

## 2020-09-08 NOTE — TELEPHONE ENCOUNTER
Forwarded to [facility] Auth Dept for below Ondansetron ordered per Dr Lucia Mitchell. Last seen in office 9/4/2020. Diagnosis Nausea R11, dyspepsia R10.13. Thanks.          Closed     Other   Case ID: [id number]      Payer:  [employer] Part D    [phone]     [phone]

## 2020-09-10 NOTE — TELEPHONE ENCOUNTER
Medication PA Requested:    Ondansetron HCl 4MG tablets                                                         CoverMyMeds Used: yes  Key: [id number]  Si tablet q8h PRN  DX Code: Nausea R11, dyspepsia R10.13                                    PA submitte

## 2020-09-11 NOTE — TELEPHONE ENCOUNTER
No PA required for Ondanestron. Contacted pharmacy and notified them that no PA is required. They were able to get Rx to go through insurance without PA.

## 2020-09-18 ENCOUNTER — OFFICE VISIT (OUTPATIENT)
Dept: INTERNAL MEDICINE CLINIC | Facility: CLINIC | Age: 75
End: 2020-09-18
Payer: COMMERCIAL

## 2020-09-18 VITALS
RESPIRATION RATE: 17 BRPM | HEART RATE: 66 BPM | SYSTOLIC BLOOD PRESSURE: 118 MMHG | BODY MASS INDEX: 34.22 KG/M2 | HEIGHT: 58 IN | OXYGEN SATURATION: 97 % | DIASTOLIC BLOOD PRESSURE: 62 MMHG | WEIGHT: 163 LBS

## 2020-09-18 DIAGNOSIS — Z23 NEED FOR INFLUENZA VACCINATION: ICD-10-CM

## 2020-09-18 DIAGNOSIS — M54.32 SCIATICA OF LEFT SIDE: ICD-10-CM

## 2020-09-18 DIAGNOSIS — M54.13 RADICULOPATHY OF CERVICOTHORACIC REGION: ICD-10-CM

## 2020-09-18 DIAGNOSIS — M41.24 OTHER IDIOPATHIC SCOLIOSIS, THORACIC REGION: ICD-10-CM

## 2020-09-18 DIAGNOSIS — I10 ESSENTIAL HYPERTENSION: ICD-10-CM

## 2020-09-18 DIAGNOSIS — H40.9 GLAUCOMA OF BOTH EYES, UNSPECIFIED GLAUCOMA TYPE: ICD-10-CM

## 2020-09-18 DIAGNOSIS — K74.60 CIRRHOSIS OF LIVER WITHOUT ASCITES, UNSPECIFIED HEPATIC CIRRHOSIS TYPE (HCC): ICD-10-CM

## 2020-09-18 DIAGNOSIS — Z00.00 ANNUAL PHYSICAL EXAM: Primary | ICD-10-CM

## 2020-09-18 DIAGNOSIS — E66.9 CLASS 1 OBESITY WITH SERIOUS COMORBIDITY AND BODY MASS INDEX (BMI) OF 34.0 TO 34.9 IN ADULT, UNSPECIFIED OBESITY TYPE: ICD-10-CM

## 2020-09-18 DIAGNOSIS — E11.9 TYPE 2 DIABETES MELLITUS WITHOUT COMPLICATION, WITHOUT LONG-TERM CURRENT USE OF INSULIN (HCC): ICD-10-CM

## 2020-09-18 DIAGNOSIS — N18.30 CKD (CHRONIC KIDNEY DISEASE) STAGE 3, GFR 30-59 ML/MIN (HCC): ICD-10-CM

## 2020-09-18 PROCEDURE — 90662 IIV NO PRSV INCREASED AG IM: CPT | Performed by: FAMILY MEDICINE

## 2020-09-18 PROCEDURE — 3008F BODY MASS INDEX DOCD: CPT | Performed by: FAMILY MEDICINE

## 2020-09-18 PROCEDURE — G0008 ADMIN INFLUENZA VIRUS VAC: HCPCS | Performed by: FAMILY MEDICINE

## 2020-09-18 PROCEDURE — G0439 PPPS, SUBSEQ VISIT: HCPCS | Performed by: FAMILY MEDICINE

## 2020-09-18 PROCEDURE — 96160 PT-FOCUSED HLTH RISK ASSMT: CPT | Performed by: FAMILY MEDICINE

## 2020-09-18 PROCEDURE — 3074F SYST BP LT 130 MM HG: CPT | Performed by: FAMILY MEDICINE

## 2020-09-18 PROCEDURE — 3078F DIAST BP <80 MM HG: CPT | Performed by: FAMILY MEDICINE

## 2020-09-18 PROCEDURE — 99397 PER PM REEVAL EST PAT 65+ YR: CPT | Performed by: FAMILY MEDICINE

## 2020-09-18 RX ORDER — BACLOFEN 5 MG/1
5 TABLET ORAL 2 TIMES DAILY PRN
Qty: 180 TABLET | Refills: 0 | Status: SHIPPED | OUTPATIENT
Start: 2020-09-18 | End: 2020-10-18

## 2020-09-18 RX ORDER — LISINOPRIL AND HYDROCHLOROTHIAZIDE 25; 20 MG/1; MG/1
TABLET ORAL
Qty: 90 TABLET | Refills: 3 | Status: SHIPPED | OUTPATIENT
Start: 2020-09-18 | End: 2021-10-21

## 2020-09-18 RX ORDER — ATORVASTATIN CALCIUM 10 MG/1
10 TABLET, FILM COATED ORAL NIGHTLY
Qty: 90 TABLET | Refills: 3 | Status: SHIPPED | OUTPATIENT
Start: 2020-09-18 | End: 2021-11-23

## 2020-09-18 NOTE — PROGRESS NOTES
HPI:   Yolanda Morelos is a 70year old female who presents for a MA Supervisit.     Patient Active Problem List:     Type 2 diabetes mellitus without complication, without long-term current use of insulin ([facility] [place] [address]     Essential hypertension     Glaucoma o daily activities? : No    Memory Problems?: No      Fall/Risk Assessment          Have you fallen in the last 12 months?: 0-No    Fall/Risk Scorin          Depression Screening (PHQ-2/PHQ-9): Over the LAST 2 WEEKS   Little interest or pleasure in doing Jay Stevens MD at [facility]   • ESOPHAGOGASTRODUODENOSCOPY (EGD) N/A 5/29/2019    Performed by Jay Stevens MD at [facility] END[facility]   • HYSTERECTOMY     • OOPHORECTOMY  1984      Family History   Problem Relation Age of Onset   • Diabete CONVERSATIONAL TONE AND PASSED WHISPER TEST  EYES: PERRLA, EOMI, conjunctiva are clear              NECK: supple, no adenopathy, no bruits  CHEST: no chest tenderness  BREAST: no masses, no discharge or no axillary lymphadenopathy   LUNGS: clear to auscult CHRONIC/STABLE    Cirrhosis of liver without ascites, unspecified hepatic cirrhosis type ([facility] [address]):  9/20 LFT'S WNL  CT ABDOMEN 7/20 = NO LIVER MASSES    Class 1 obesity with serious comorbidity and body mass index (BMI) of 34.0 to 34.9 in adult, unspecified o Screening Mammogram      Mammogram  Annually Mammogram due on 06/30/2021 Update Health Maintenance if applicable   Immunizations      Influenza No orders found for this or any previous visit.  Update Immunization Activity if applicable    Pneumococcal No high risk q1 year ON EYE DROPS, WILL RECHECK   Pap If at high risk q1 year There are no preventive care reminders to display for this patient. Pap All Patients q2 year if indicated There are no preventive care reminders to display for this patient.    Adam

## 2020-09-19 LAB
BUN/CREATININE RATIO: 21 (CALC) (ref 6–22)
BUN: 22 MG/DL (ref 7–25)
CALCIUM: 9.9 MG/DL (ref 8.6–10.4)
CARBON DIOXIDE: 29 MMOL/L (ref 20–32)
CHLORIDE: 102 MMOL/L (ref 98–110)
CHOL/HDLC RATIO: 1.8 (CALC)
CHOLESTEROL, TOTAL: 122 MG/DL
CREATININE: 1.04 MG/DL (ref 0.6–0.93)
EGFR IF AFRICN AM: 62 ML/MIN/1.73M2
EGFR IF NONAFRICN AM: 53 ML/MIN/1.73M2
GLUCOSE: 123 MG/DL (ref 65–99)
HDL CHOLESTEROL: 66 MG/DL
LDL-CHOLESTEROL: 36 MG/DL (CALC)
NON-HDL CHOLESTEROL: 56 MG/DL (CALC)
POTASSIUM: 5.1 MMOL/L (ref 3.5–5.3)
SODIUM: 139 MMOL/L (ref 135–146)
TRIGLYCERIDES: 122 MG/DL

## 2021-01-19 ENCOUNTER — OFFICE VISIT (OUTPATIENT)
Dept: FAMILY MEDICINE CLINIC | Facility: CLINIC | Age: 76
End: 2021-01-19
Payer: COMMERCIAL

## 2021-01-19 VITALS
WEIGHT: 163 LBS | OXYGEN SATURATION: 99 % | RESPIRATION RATE: 15 BRPM | BODY MASS INDEX: 34.22 KG/M2 | HEIGHT: 58 IN | DIASTOLIC BLOOD PRESSURE: 61 MMHG | TEMPERATURE: 98 F | SYSTOLIC BLOOD PRESSURE: 127 MMHG | HEART RATE: 76 BPM

## 2021-01-19 DIAGNOSIS — H61.22 IMPACTED CERUMEN OF LEFT EAR: Primary | ICD-10-CM

## 2021-01-19 PROCEDURE — 3074F SYST BP LT 130 MM HG: CPT | Performed by: NURSE PRACTITIONER

## 2021-01-19 PROCEDURE — 3008F BODY MASS INDEX DOCD: CPT | Performed by: NURSE PRACTITIONER

## 2021-01-19 PROCEDURE — 99213 OFFICE O/P EST LOW 20 MIN: CPT | Performed by: NURSE PRACTITIONER

## 2021-01-19 PROCEDURE — 3078F DIAST BP <80 MM HG: CPT | Performed by: NURSE PRACTITIONER

## 2021-01-19 NOTE — PROGRESS NOTES
Patient presents with:    Ear pain/pressure/plugged    HPI: Patient presents Melvia Dannemora on left, possible removal of ear wax. No known Hx with cerumen build up. Home treatments tried: OTC straw/candling device she bought, didn't seem to help.  Reports left e etiology without testing but Hx/exam not highly consistent. - Pt verbalizes understanding and is agreeable with plan today.

## 2021-01-19 NOTE — PATIENT INSTRUCTIONS
Impacted Earwax     Inner ear structures including ear canal and eardrum. Impacted earwax is a buildup of the natural wax in the ear. Impacted earwax is very common. It can cause symptoms such as hearing loss.  It can also make it hard for a healthcare loss  · Earache  · Sense of ear fullness  · Itching in the ear  · Odor from the ear  · Ear drainage  · Dizziness  · Ringing in the ears  · Cough  Treatment for impacted earwax  If you don’t have symptoms, you may not need treatment.  Often the earwax goes a

## 2021-02-02 DIAGNOSIS — Z23 NEED FOR VACCINATION: ICD-10-CM

## 2021-02-17 ENCOUNTER — OFFICE VISIT (OUTPATIENT)
Dept: INTERNAL MEDICINE CLINIC | Facility: CLINIC | Age: 76
End: 2021-02-17
Payer: COMMERCIAL

## 2021-02-17 VITALS
WEIGHT: 166 LBS | SYSTOLIC BLOOD PRESSURE: 108 MMHG | HEART RATE: 70 BPM | DIASTOLIC BLOOD PRESSURE: 82 MMHG | BODY MASS INDEX: 34.85 KG/M2 | HEIGHT: 58 IN | OXYGEN SATURATION: 98 % | RESPIRATION RATE: 17 BRPM

## 2021-02-17 DIAGNOSIS — K74.60 CIRRHOSIS OF LIVER WITHOUT ASCITES, UNSPECIFIED HEPATIC CIRRHOSIS TYPE (HCC): ICD-10-CM

## 2021-02-17 DIAGNOSIS — I10 ESSENTIAL HYPERTENSION: ICD-10-CM

## 2021-02-17 DIAGNOSIS — R53.83 OTHER FATIGUE: ICD-10-CM

## 2021-02-17 DIAGNOSIS — N18.31 STAGE 3A CHRONIC KIDNEY DISEASE (HCC): ICD-10-CM

## 2021-02-17 DIAGNOSIS — E11.9 TYPE 2 DIABETES MELLITUS WITHOUT COMPLICATION, WITHOUT LONG-TERM CURRENT USE OF INSULIN (HCC): Primary | ICD-10-CM

## 2021-02-17 PROCEDURE — 99214 OFFICE O/P EST MOD 30 MIN: CPT | Performed by: FAMILY MEDICINE

## 2021-02-17 PROCEDURE — 3079F DIAST BP 80-89 MM HG: CPT | Performed by: FAMILY MEDICINE

## 2021-02-17 PROCEDURE — 3074F SYST BP LT 130 MM HG: CPT | Performed by: FAMILY MEDICINE

## 2021-02-17 PROCEDURE — 3008F BODY MASS INDEX DOCD: CPT | Performed by: FAMILY MEDICINE

## 2021-02-17 NOTE — PROGRESS NOTES
PATIENT IDENTIFICATION  Name: Fide Gomez  MRN: [id number]    Diagnoses:   Type 2 diabetes mellitus without complication, without long-term current use of insulin [facility]  (primary encounter diagnosis)  Other fatigue  Cirrhosis of liver without ascites Oral Tab Take 1 tablet (1,000 mg total) by mouth 2 (two) times daily with meals. 180 tablet 3   • atorvastatin 10 MG Oral Tab Take 1 tablet (10 mg total) by mouth nightly.  90 tablet 3   • TRAVATAN Z 0.004 % Ophthalmic Solution INT 1 GTT OU QD HS  0       O Care Coordination:   minutes       My total time spent caring for the patient on the day of the encounter: 30 minutes.

## 2021-02-18 LAB
ALBUMIN/GLOBULIN RATIO: 1.3 (CALC) (ref 1–2.5)
ALBUMIN: 4.3 G/DL (ref 3.6–5.1)
ALKALINE PHOSPHATASE: 123 U/L (ref 37–153)
ALT: 32 U/L (ref 6–29)
AST: 41 U/L (ref 10–35)
BILIRUBIN, TOTAL: 0.5 MG/DL (ref 0.2–1.2)
BUN/CREATININE RATIO: 23 (CALC) (ref 6–22)
BUN: 22 MG/DL (ref 7–25)
CALCIUM: 9.8 MG/DL (ref 8.6–10.4)
CARBON DIOXIDE: 29 MMOL/L (ref 20–32)
CHLORIDE: 102 MMOL/L (ref 98–110)
CREATININE: 0.95 MG/DL (ref 0.6–0.93)
EGFR IF AFRICN AM: 69 ML/MIN/1.73M2
EGFR IF NONAFRICN AM: 59 ML/MIN/1.73M2
GLOBULIN: 3.3 G/DL (CALC) (ref 1.9–3.7)
GLUCOSE: 156 MG/DL (ref 65–99)
HEMATOCRIT: 37.5 % (ref 35–45)
HEMOGLOBIN A1C: 6.9 % OF TOTAL HGB
HEMOGLOBIN: 12.4 G/DL (ref 11.7–15.5)
MCH: 28.9 PG (ref 27–33)
MCHC: 33.1 G/DL (ref 32–36)
MCV: 87.4 FL (ref 80–100)
MPV: 10.2 FL (ref 7.5–12.5)
PLATELET COUNT: 339 THOUSAND/UL (ref 140–400)
POTASSIUM: 4.9 MMOL/L (ref 3.5–5.3)
PROTEIN, TOTAL: 7.6 G/DL (ref 6.1–8.1)
RDW: 12.8 % (ref 11–15)
RED BLOOD CELL COUNT: 4.29 MILLION/UL (ref 3.8–5.1)
SODIUM: 138 MMOL/L (ref 135–146)
TSH W/REFLEX TO FT4: 1.69 MIU/L (ref 0.4–4.5)
WHITE BLOOD CELL COUNT: 6.2 THOUSAND/UL (ref 3.8–10.8)

## 2021-03-03 ENCOUNTER — OFFICE VISIT (OUTPATIENT)
Dept: INTERNAL MEDICINE CLINIC | Facility: CLINIC | Age: 76
End: 2021-03-03
Payer: COMMERCIAL

## 2021-03-03 VITALS
SYSTOLIC BLOOD PRESSURE: 120 MMHG | RESPIRATION RATE: 17 BRPM | HEART RATE: 81 BPM | OXYGEN SATURATION: 97 % | DIASTOLIC BLOOD PRESSURE: 68 MMHG | BODY MASS INDEX: 35.26 KG/M2 | WEIGHT: 168 LBS | HEIGHT: 58 IN

## 2021-03-03 DIAGNOSIS — G89.29 CHRONIC MIDLINE LOW BACK PAIN WITHOUT SCIATICA: ICD-10-CM

## 2021-03-03 DIAGNOSIS — H81.10 BENIGN PAROXYSMAL POSITIONAL VERTIGO, UNSPECIFIED LATERALITY: Primary | ICD-10-CM

## 2021-03-03 DIAGNOSIS — M54.50 CHRONIC MIDLINE LOW BACK PAIN WITHOUT SCIATICA: ICD-10-CM

## 2021-03-03 PROCEDURE — 3078F DIAST BP <80 MM HG: CPT | Performed by: FAMILY MEDICINE

## 2021-03-03 PROCEDURE — 99214 OFFICE O/P EST MOD 30 MIN: CPT | Performed by: FAMILY MEDICINE

## 2021-03-03 PROCEDURE — 3008F BODY MASS INDEX DOCD: CPT | Performed by: FAMILY MEDICINE

## 2021-03-03 PROCEDURE — 3074F SYST BP LT 130 MM HG: CPT | Performed by: FAMILY MEDICINE

## 2021-03-03 RX ORDER — ASPIRIN 81 MG/1
TABLET, CHEWABLE ORAL DAILY
COMMUNITY

## 2021-03-03 NOTE — PROGRESS NOTES
PATIENT IDENTIFICATION  Name: Uzair Greenwood  MRN: [id number]    Diagnoses:   Benign paroxysmal positional vertigo, unspecified laterality  (primary encounter diagnosis)  Chronic midline low back pain without sciatica    SUBJECTIVE:  Uzairgualberto Greenwood i Vaping Use: Never used    Alcohol use: Never    Drug use: Never      Current Outpatient Medications   Medication Sig Dispense Refill   • aspirin 81 MG Oral Chew Tab Chew by mouth daily.      • Lisinopril-hydroCHLOROthiazide 20-25 MG Oral Tab TK 1 T PO D 9

## 2021-03-22 ENCOUNTER — OFFICE VISIT (OUTPATIENT)
Dept: FAMILY MEDICINE CLINIC | Facility: CLINIC | Age: 76
End: 2021-03-22
Payer: COMMERCIAL

## 2021-03-22 VITALS
DIASTOLIC BLOOD PRESSURE: 60 MMHG | BODY MASS INDEX: 35.26 KG/M2 | SYSTOLIC BLOOD PRESSURE: 132 MMHG | WEIGHT: 168 LBS | OXYGEN SATURATION: 97 % | RESPIRATION RATE: 16 BRPM | HEIGHT: 58 IN | HEART RATE: 100 BPM | TEMPERATURE: 99 F

## 2021-03-22 DIAGNOSIS — J06.9 UPPER RESPIRATORY TRACT INFECTION, UNSPECIFIED TYPE: Primary | ICD-10-CM

## 2021-03-22 PROCEDURE — 3078F DIAST BP <80 MM HG: CPT | Performed by: PHYSICIAN ASSISTANT

## 2021-03-22 PROCEDURE — 99213 OFFICE O/P EST LOW 20 MIN: CPT | Performed by: PHYSICIAN ASSISTANT

## 2021-03-22 PROCEDURE — 3008F BODY MASS INDEX DOCD: CPT | Performed by: PHYSICIAN ASSISTANT

## 2021-03-22 PROCEDURE — 3075F SYST BP GE 130 - 139MM HG: CPT | Performed by: PHYSICIAN ASSISTANT

## 2021-03-22 NOTE — PROGRESS NOTES
CHIEF COMPLAINT:   Patient presents with:  Covid    HPI:   Lauren Cantu is a 70year old female who presents to [facility] for COVID-19 testing with symptoms/history as described below:  Onset: 3 days ago  Exposure to COVID:   none    • Fever:     Yes [] Procedure Laterality Date   • COLONOSCOPY     • COLONOSCOPY N/A 5/29/2019    Performed by Stefano Carranza MD at [facility]   • ESOPHAGOGASTRODUODENOSCOPY (EGD) N/A 5/29/2019    Performed by Stefano Carranza MD at [address] [facility] requested or ordered in this encounter     . PLAN:    COVID-19 testing ordered. Discussed turnaround time for results with patient. - Advised to self quarantine at home while awaiting result.     - Supportive care as described in Patient Instructions a prescribed. If you have chronic liver or kidney disease, have ever had a stomach ulcer or gastrointestinal bleeding, or are taking blood-thinning medicines, talk with your healthcare provider before using these medicines.  Aspirin should never be given to a

## 2021-03-22 NOTE — PATIENT INSTRUCTIONS
Viral Upper Respiratory Illness (Adult)    You have a viral upper respiratory illness (URI), which is another term for the common cold. This illness is contagious during the first few days. It is spread through the air by coughing and sneezing.  It may al decongestants if you have high blood pressure.)  Follow-up care  Follow up with your healthcare provider, or as advised.   When to seek medical advice  Call your healthcare provider right away if any of these occur:  · Cough with lots of colored sputum (muc

## 2021-03-23 LAB — SARS-COV-2 RNA RESP QL NAA+PROBE: NOT DETECTED

## 2021-05-21 ENCOUNTER — OFFICE VISIT (OUTPATIENT)
Dept: INTERNAL MEDICINE CLINIC | Facility: CLINIC | Age: 76
End: 2021-05-21
Payer: COMMERCIAL

## 2021-05-21 VITALS
OXYGEN SATURATION: 97 % | RESPIRATION RATE: 17 BRPM | HEIGHT: 58 IN | DIASTOLIC BLOOD PRESSURE: 52 MMHG | SYSTOLIC BLOOD PRESSURE: 130 MMHG | WEIGHT: 165 LBS | HEART RATE: 81 BPM | BODY MASS INDEX: 34.63 KG/M2

## 2021-05-21 DIAGNOSIS — E11.9 TYPE 2 DIABETES MELLITUS WITHOUT COMPLICATION, WITHOUT LONG-TERM CURRENT USE OF INSULIN (HCC): ICD-10-CM

## 2021-05-21 DIAGNOSIS — G56.01 CARPAL TUNNEL SYNDROME OF RIGHT WRIST: ICD-10-CM

## 2021-05-21 DIAGNOSIS — H92.02 LEFT EAR PAIN: Primary | ICD-10-CM

## 2021-05-21 DIAGNOSIS — R79.89 ELEVATED LFTS: ICD-10-CM

## 2021-05-21 PROCEDURE — 99214 OFFICE O/P EST MOD 30 MIN: CPT | Performed by: FAMILY MEDICINE

## 2021-05-21 PROCEDURE — 3075F SYST BP GE 130 - 139MM HG: CPT | Performed by: FAMILY MEDICINE

## 2021-05-21 PROCEDURE — 3008F BODY MASS INDEX DOCD: CPT | Performed by: FAMILY MEDICINE

## 2021-05-21 PROCEDURE — 3044F HG A1C LEVEL LT 7.0%: CPT | Performed by: FAMILY MEDICINE

## 2021-05-21 PROCEDURE — 3078F DIAST BP <80 MM HG: CPT | Performed by: FAMILY MEDICINE

## 2021-05-21 NOTE — PROGRESS NOTES
PATIENT IDENTIFICATION  Name: iMchelle Wren  MRN: [id number]    Diagnoses:   Left ear pain  (primary encounter diagnosis)  Type 2 diabetes mellitus without complication, without long-term current use of insulin (HCC)  Elevated LFTs    SUBJECTIVE:  Amal tablet (10 mg total) by mouth nightly. 90 tablet 3   • TRAVATAN Z 0.004 % Ophthalmic Solution INT 1 GTT OU QD HS  0   • aspirin 81 MG Oral Chew Tab Chew by mouth daily.  (Patient not taking: Reported on 5/21/2021 )         OBJECTIVE:  /52 (BP Location minutes      Care Coordination:   minutes       My total time spent caring for the patient on the day of the encounter: 32 minutes.

## 2021-06-07 ENCOUNTER — OFFICE VISIT (OUTPATIENT)
Dept: INTERNAL MEDICINE CLINIC | Facility: CLINIC | Age: 76
End: 2021-06-07
Payer: COMMERCIAL

## 2021-06-07 VITALS
WEIGHT: 165 LBS | BODY MASS INDEX: 34.63 KG/M2 | HEART RATE: 66 BPM | RESPIRATION RATE: 17 BRPM | DIASTOLIC BLOOD PRESSURE: 62 MMHG | HEIGHT: 58 IN | SYSTOLIC BLOOD PRESSURE: 128 MMHG | OXYGEN SATURATION: 98 %

## 2021-06-07 DIAGNOSIS — E11.9 TYPE 2 DIABETES MELLITUS WITHOUT COMPLICATION, WITHOUT LONG-TERM CURRENT USE OF INSULIN (HCC): ICD-10-CM

## 2021-06-07 DIAGNOSIS — M85.80 OSTEOPENIA, UNSPECIFIED LOCATION: ICD-10-CM

## 2021-06-07 DIAGNOSIS — H40.9 GLAUCOMA OF BOTH EYES, UNSPECIFIED GLAUCOMA TYPE: ICD-10-CM

## 2021-06-07 DIAGNOSIS — I10 ESSENTIAL HYPERTENSION: ICD-10-CM

## 2021-06-07 DIAGNOSIS — M15.9 PRIMARY OSTEOARTHRITIS INVOLVING MULTIPLE JOINTS: ICD-10-CM

## 2021-06-07 DIAGNOSIS — Z87.19 HISTORY OF RECTAL BLEEDING: ICD-10-CM

## 2021-06-07 DIAGNOSIS — Z78.0 POSTMENOPAUSAL: ICD-10-CM

## 2021-06-07 DIAGNOSIS — K92.2 ACUTE LOWER GASTROINTESTINAL BLEEDING: ICD-10-CM

## 2021-06-07 DIAGNOSIS — E66.9 CLASS 1 OBESITY WITH SERIOUS COMORBIDITY AND BODY MASS INDEX (BMI) OF 34.0 TO 34.9 IN ADULT, UNSPECIFIED OBESITY TYPE: ICD-10-CM

## 2021-06-07 DIAGNOSIS — K57.30 DIVERTICULOSIS OF COLON: ICD-10-CM

## 2021-06-07 DIAGNOSIS — Z12.31 VISIT FOR SCREENING MAMMOGRAM: ICD-10-CM

## 2021-06-07 DIAGNOSIS — M41.24 OTHER IDIOPATHIC SCOLIOSIS, THORACIC REGION: ICD-10-CM

## 2021-06-07 DIAGNOSIS — Z00.00 ENCOUNTER FOR ANNUAL HEALTH EXAMINATION: Primary | ICD-10-CM

## 2021-06-07 DIAGNOSIS — K74.60 CIRRHOSIS OF LIVER WITHOUT ASCITES, UNSPECIFIED HEPATIC CIRRHOSIS TYPE (HCC): ICD-10-CM

## 2021-06-07 DIAGNOSIS — N18.31 STAGE 3A CHRONIC KIDNEY DISEASE (HCC): Chronic | ICD-10-CM

## 2021-06-07 PROCEDURE — 96160 PT-FOCUSED HLTH RISK ASSMT: CPT | Performed by: FAMILY MEDICINE

## 2021-06-07 PROCEDURE — 3008F BODY MASS INDEX DOCD: CPT | Performed by: FAMILY MEDICINE

## 2021-06-07 PROCEDURE — 3074F SYST BP LT 130 MM HG: CPT | Performed by: FAMILY MEDICINE

## 2021-06-07 PROCEDURE — 3078F DIAST BP <80 MM HG: CPT | Performed by: FAMILY MEDICINE

## 2021-06-07 PROCEDURE — G0439 PPPS, SUBSEQ VISIT: HCPCS | Performed by: FAMILY MEDICINE

## 2021-06-07 PROCEDURE — 99397 PER PM REEVAL EST PAT 65+ YR: CPT | Performed by: FAMILY MEDICINE

## 2021-06-07 NOTE — PATIENT INSTRUCTIONS
Toya Mcdaniel's SCREENING SCHEDULE   Tests on this list are recommended by your physician but may not be covered, or covered at this frequency, by your insurer. Please check with your insurance carrier before scheduling to verify coverage.    PREVEN Scan Never done   Pap and Pelvic    Pap   Covered every 2 years for women at normal risk;  Annually if at high risk -  No recommendations at this time    Chlamydia Annually if high risk 07/19/2020  No recommendations at this time   [address] [facility] ([facility]) 02/17/2021         BUN Annually Lab Results   Component Value Date    BUN 22 02/17/2021       Drug Serum Conc Annually No results found for: DIGOXIN, DIG, VALP              Recommended Websites for Advanced Directives    [email or web address]

## 2021-06-07 NOTE — PROGRESS NOTES
HPI:   Stephen Pretty is a 70year old female who presents for a MA (Medicare Advantage) Supervisit (Once per calendar year).     Fall/Risk Assessment   She has been screened for Falls and is low risk: Fall/Risk Scorin    Cognitive Assessment   She explanation and discussion of advance directives standard forms performed Face to Face with patient and Family/surrogate (if present), and forms available to patient in AVS     She does NOT have a Power of  for [employer] on file in [address] [place] [address] Nathan Vidales ALLERGIES:   She is allergic to gabapentin. CURRENT MEDICATIONS:   Elastic Bandages & Supports (WRIST SPLINT) Does not apply Misc, Apply to right wrist as needed.   Lisinopril-hydroCHLOROthiazide 20-25 MG Oral Tab, TK 1 T PO D  metFORMIN HCl 1000 MG nervous/anxious.         EXAM:   /62 (BP Location: Right arm, Patient Position: Sitting, Cuff Size: large)   Pulse 66   Resp 17   Ht 4' 10\" (1.473 m)   Wt 165 lb (74.8 kg)   SpO2 98%   BMI 34.49 kg/m²  Estimated body mass index is 34.49 kg/m² as calc ([facility]    6. Type 2 diabetes mellitus without complication, without long-term current use of insulin (HCC)  - OPHTHALMOLOGY - INTERNAL    7.  Class 1 obesity with serious comorbidity and body mass index (BMI) of 34.0 to 34.9 in adult, unspecified obesity typ diagnosed with pre-diabetes   One screening every 6 months if diagnosed with pre-diabetes Lab Results   Component Value Date     (H) 02/17/2021        Cardiovascular Disease Screening    Lipid Panel  Cholesterol  Lipoprotein (HDL)  Triglycerides Cov Covered once per flu season  Please get every year 09/18/2020  No recommendations at this time    Pneumococcal Each vaccine ([id number] & [id number]) covered once after 65 Prevnar 13: 09/26/2019    [id number]: 09/27/2018     No recommendations at this rosario

## 2021-06-10 PROBLEM — M54.13 RADICULOPATHY OF CERVICOTHORACIC REGION: Status: RESOLVED | Noted: 2020-01-30 | Resolved: 2021-06-10

## 2021-06-10 PROBLEM — M54.32 SCIATICA OF LEFT SIDE: Status: RESOLVED | Noted: 2019-08-19 | Resolved: 2021-06-10

## 2021-07-13 ENCOUNTER — OFFICE VISIT (OUTPATIENT)
Dept: INTERNAL MEDICINE CLINIC | Facility: CLINIC | Age: 76
End: 2021-07-13
Payer: COMMERCIAL

## 2021-07-13 VITALS
DIASTOLIC BLOOD PRESSURE: 66 MMHG | HEIGHT: 58 IN | WEIGHT: 168 LBS | OXYGEN SATURATION: 98 % | HEART RATE: 73 BPM | BODY MASS INDEX: 35.26 KG/M2 | SYSTOLIC BLOOD PRESSURE: 124 MMHG | RESPIRATION RATE: 17 BRPM

## 2021-07-13 DIAGNOSIS — R42 DIZZINESS: Primary | ICD-10-CM

## 2021-07-13 PROCEDURE — 99213 OFFICE O/P EST LOW 20 MIN: CPT | Performed by: FAMILY MEDICINE

## 2021-07-13 PROCEDURE — 3008F BODY MASS INDEX DOCD: CPT | Performed by: FAMILY MEDICINE

## 2021-07-13 PROCEDURE — 3074F SYST BP LT 130 MM HG: CPT | Performed by: FAMILY MEDICINE

## 2021-07-13 PROCEDURE — 3078F DIAST BP <80 MM HG: CPT | Performed by: FAMILY MEDICINE

## 2021-07-13 RX ORDER — MECLIZINE HYDROCHLORIDE 25 MG/1
25 TABLET ORAL 2 TIMES DAILY PRN
Qty: 20 TABLET | Refills: 0 | Status: SHIPPED | OUTPATIENT
Start: 2021-07-13

## 2021-07-13 NOTE — PROGRESS NOTES
PATIENT IDENTIFICATION  Name: Stephen Pretty  MRN: [id number]    Diagnoses:   Dizziness  (primary encounter diagnosis)    SUBJECTIVE:  Stephen Pretty is a 70year old female who presents for Dizziness.       About 3 weeks ago started having pain in th Tab TK 1 T PO D 90 tablet 3   • metFORMIN HCl 1000 MG Oral Tab Take 1 tablet (1,000 mg total) by mouth 2 (two) times daily with meals. 180 tablet 3   • atorvastatin 10 MG Oral Tab Take 1 tablet (10 mg total) by mouth nightly.  90 tablet 3   • TRAVATAN Z 0.0

## 2021-07-14 LAB
BUN: 24 MG/DL (ref 7–25)
CALCIUM: 9.6 MG/DL (ref 8.6–10.4)
CARBON DIOXIDE: 25 MMOL/L (ref 20–32)
CHLORIDE: 102 MMOL/L (ref 98–110)
CREATININE: 0.9 MG/DL (ref 0.6–0.93)
EGFR IF AFRICN AM: 74 ML/MIN/1.73M2
EGFR IF NONAFRICN AM: 63 ML/MIN/1.73M2
GLUCOSE: 137 MG/DL (ref 65–99)
HEMATOCRIT: 37.3 % (ref 35–45)
HEMOGLOBIN: 12.2 G/DL (ref 11.7–15.5)
MCH: 28.6 PG (ref 27–33)
MCHC: 32.7 G/DL (ref 32–36)
MCV: 87.4 FL (ref 80–100)
MPV: 10.2 FL (ref 7.5–12.5)
PLATELET COUNT: 349 THOUSAND/UL (ref 140–400)
POTASSIUM: 4.6 MMOL/L (ref 3.5–5.3)
RDW: 12.7 % (ref 11–15)
RED BLOOD CELL COUNT: 4.27 MILLION/UL (ref 3.8–5.1)
SODIUM: 139 MMOL/L (ref 135–146)
WHITE BLOOD CELL COUNT: 7.6 THOUSAND/UL (ref 3.8–10.8)

## 2021-08-13 ENCOUNTER — APPOINTMENT (OUTPATIENT)
Dept: GENERAL RADIOLOGY | Age: 76
End: 2021-08-13
Attending: NURSE PRACTITIONER
Payer: MEDICARE

## 2021-08-13 ENCOUNTER — HOSPITAL ENCOUNTER (OUTPATIENT)
Age: 76
Discharge: HOME | End: 2021-08-13
Payer: MEDICARE

## 2021-08-13 VITALS
HEIGHT: 58 IN | OXYGEN SATURATION: 96 % | RESPIRATION RATE: 16 BRPM | BODY MASS INDEX: 33.58 KG/M2 | HEART RATE: 90 BPM | WEIGHT: 160 LBS | SYSTOLIC BLOOD PRESSURE: 103 MMHG | DIASTOLIC BLOOD PRESSURE: 68 MMHG | TEMPERATURE: 97 F

## 2021-08-13 DIAGNOSIS — M25.569 KNEE PAIN: Primary | ICD-10-CM

## 2021-08-13 DIAGNOSIS — M25.461 EFFUSION OF RIGHT KNEE: ICD-10-CM

## 2021-08-13 PROCEDURE — 73560 X-RAY EXAM OF KNEE 1 OR 2: CPT | Performed by: NURSE PRACTITIONER

## 2021-08-13 PROCEDURE — 99213 OFFICE O/P EST LOW 20 MIN: CPT | Performed by: NURSE PRACTITIONER

## 2021-08-13 NOTE — ED PROVIDER NOTES
Patient Seen in: [facility]      History   Patient presents with:  Leg or Foot Injury    Stated Complaint: knee injury     HPI/Subjective:   HPI    This is a well-appearing 17-year-old female who presents with right knee pain.   Patient states Conjunctivae normal.      Pupils: Pupils are equal, round, and reactive to light. Cardiovascular:      Rate and Rhythm: Normal rate and regular rhythm. Pulses: Normal pulses. Heart sounds: Normal heart sounds.    Pulmonary:      Effort: Pulmonar available prior medical records for any recent pertinent discharge summaries/testing. This includes but not limited to OP/IP visits, radiology tests , clinical labs tests, EKG's, and medication.    I Updated patient  on all findings, who verbalized Nissa

## 2021-08-16 ENCOUNTER — TELEPHONE (OUTPATIENT)
Dept: INTERNAL MEDICINE CLINIC | Facility: CLINIC | Age: 76
End: 2021-08-16

## 2021-08-16 NOTE — TELEPHONE ENCOUNTER
Reached patient for medication adherence consult. Patient is past due for refill on atorvastatin; last filled 4/25/21. Patient reports she is still taking this medication and believes she had it refilled last week.      Patient denies forgetting or miss

## 2021-08-17 ENCOUNTER — OFFICE VISIT (OUTPATIENT)
Dept: ORTHOPEDICS CLINIC | Facility: CLINIC | Age: 76
End: 2021-08-17
Payer: COMMERCIAL

## 2021-08-17 VITALS — HEART RATE: 69 BPM | DIASTOLIC BLOOD PRESSURE: 63 MMHG | SYSTOLIC BLOOD PRESSURE: 112 MMHG

## 2021-08-17 DIAGNOSIS — M23.91 INTERNAL DERANGEMENT OF RIGHT KNEE: Primary | ICD-10-CM

## 2021-08-17 PROCEDURE — 20610 DRAIN/INJ JOINT/BURSA W/O US: CPT | Performed by: PHYSICIAN ASSISTANT

## 2021-08-17 PROCEDURE — 99204 OFFICE O/P NEW MOD 45 MIN: CPT | Performed by: ORTHOPAEDIC SURGERY

## 2021-08-17 PROCEDURE — 3078F DIAST BP <80 MM HG: CPT | Performed by: ORTHOPAEDIC SURGERY

## 2021-08-17 PROCEDURE — 3074F SYST BP LT 130 MM HG: CPT | Performed by: ORTHOPAEDIC SURGERY

## 2021-08-17 RX ORDER — TRIAMCINOLONE ACETONIDE 40 MG/ML
40 INJECTION, SUSPENSION INTRA-ARTICULAR; INTRAMUSCULAR ONCE
Status: COMPLETED | OUTPATIENT
Start: 2021-08-17 | End: 2021-08-17

## 2021-08-17 NOTE — PROGRESS NOTES
Per verbal order from [facility], draw up 5ml of 0.5% Marcaine and 1ml of Kenalog 40 for cortisone injection to the right knee

## 2021-08-17 NOTE — H&P
NURSING INTAKE COMMENTS: Patient presents with:  New Patient: Pain and numbness in rght knee. Last wednesday 8/11 states knee shifted to inside. welling since 8/11. pain 4/10 in office. knee support worn states she looses balance often.  xrays taken 8/13/21 UNKNOWN    Comment:Does not feel well, per pt  Family History   Problem Relation Age of Onset   • Diabetes Father    • Cancer Mother         kidney   • Diabetes Sister      No family Hx of DVT/PE    Social History    Occupational History      Not from 8/13/2021 were reviewed. Overall normal-appearing. Very mild joint space narrowing along the medial tibiofemoral compartment. No fracture noted.       XR KNEE (1 OR 2 VIEWS), RIGHT (CPT=73560)    Result Date: 8/13/2021  PROCEDURE: XR KNEE (1 OR 2 VI still having pain, will obtain MRI of the right knee and follow-up in the office to review results. Follow Up: No follow-ups on file.     Teresa Shen MD

## 2021-08-20 ENCOUNTER — OFFICE VISIT (OUTPATIENT)
Dept: GASTROENTEROLOGY | Facility: CLINIC | Age: 76
End: 2021-08-20
Payer: COMMERCIAL

## 2021-08-20 VITALS
HEIGHT: 58 IN | SYSTOLIC BLOOD PRESSURE: 126 MMHG | BODY MASS INDEX: 35.35 KG/M2 | WEIGHT: 168.38 LBS | DIASTOLIC BLOOD PRESSURE: 70 MMHG | HEART RATE: 74 BPM

## 2021-08-20 DIAGNOSIS — R10.12 LUQ ABDOMINAL PAIN: ICD-10-CM

## 2021-08-20 DIAGNOSIS — K74.60 CIRRHOSIS OF LIVER WITHOUT ASCITES, UNSPECIFIED HEPATIC CIRRHOSIS TYPE (HCC): ICD-10-CM

## 2021-08-20 DIAGNOSIS — R11.2 NON-INTRACTABLE VOMITING WITH NAUSEA, UNSPECIFIED VOMITING TYPE: ICD-10-CM

## 2021-08-20 DIAGNOSIS — R10.13 EPIGASTRIC ABDOMINAL PAIN: Primary | ICD-10-CM

## 2021-08-20 PROCEDURE — 3074F SYST BP LT 130 MM HG: CPT | Performed by: INTERNAL MEDICINE

## 2021-08-20 PROCEDURE — 3078F DIAST BP <80 MM HG: CPT | Performed by: INTERNAL MEDICINE

## 2021-08-20 PROCEDURE — 99214 OFFICE O/P EST MOD 30 MIN: CPT | Performed by: INTERNAL MEDICINE

## 2021-08-20 PROCEDURE — 3008F BODY MASS INDEX DOCD: CPT | Performed by: INTERNAL MEDICINE

## 2021-08-20 NOTE — PROGRESS NOTES
HPI:    Patient ID: Michelle Wren presents today for follow-up    She is suffering worsening acute on chronic knee pain apparently due to problems including meniscus tear.   Apparently just underwent a triamcinolone steroid injection R knee with Dr. Asher Diaz episodes of painless hematochezia. She had a moderate acute blood loss anemia with hemoglobin 10.4g.  she was likely suffering her baseline left-sided, left upper quadrant abdominal pain. As per discharge summary, she did well.   The bleeding stopped soon describing a 1-1.5-year history of left-sided abdominal pain. She describes a fairly constant mid left abdominal pain which sometimes radiates around to the left back, sometimes straight down toward her knee. She describes this as sharp in nature.     Carlos ======================    8/21/2016 US ABDOMEN LIMITED  [facility]    CLINICAL HISTORY: ABNORMAL LIVER FUNCTION TESTS         COMPARISON: None available.      TECHNIQUE:    Limited ultrasound examination of the right upper quadrant BOWEL/MESENTERY:  Colonic diverticulosis without diverticulitis. Anup Solum. No inflammatory changes.   ABDOMINAL WALL:      Small fat containing umbilical hernia postsurgical scarring midline anterior abdominal wall in the infraumbilical region  UR of EGD and colonoscopy examinations under MAC anesthesia were discussed with the patient and all questions answered, informed consent was obtained.   The patient was sedated as above.       Once sedated, the Olympus adult diagnostic gastroscope was placed i hemorrhoids. IMPRESSION:  · Mild reflux esophagitis only on EGD examination. Normal stomach and duodenum. · Severe colonic diverticulosis ascending, descending, sigmoid colon. · 6 mm sigmoid colon polyp removed as above.      RECOMMENDATIONS:  · Foll QD HS  0     Allergies:  Gabapentin              UNKNOWN    Comment:Does not feel well, per pt  Imaging: No results found.      PHYSICAL EXAM:   Physical Exam  Constitutional:       Comments: Elevated BMI  Climbs on and off examining table with extreme pain alkaline phosphatase as above. Reassuring EGD and colonoscopy examinations May 2019 copied above. Hospitalized 7/19/2020–7/21/2020 for brief lower GI bleed, hematochezia event attributed to diverticular bleed.     Returns for follow-up 9/4/2020 regard prescription med. Prescription sent in.    3.  Abnormal appearing liver, possible cirrhotic morphology  · Normal spleen on CT scan, repeatedly normal platelet count. · No family history of liver disease, no alcohol history.   · Possible OLMOS; recent LFTs

## 2021-08-23 ENCOUNTER — HOSPITAL ENCOUNTER (OUTPATIENT)
Dept: ULTRASOUND IMAGING | Facility: HOSPITAL | Age: 76
Discharge: HOME | End: 2021-08-23
Attending: ORTHOPAEDIC SURGERY
Payer: MEDICARE

## 2021-08-23 ENCOUNTER — LAB ENCOUNTER (OUTPATIENT)
Dept: LAB | Age: 76
End: 2021-08-23
Attending: INTERNAL MEDICINE
Payer: MEDICARE

## 2021-08-23 ENCOUNTER — TELEPHONE (OUTPATIENT)
Dept: ORTHOPEDICS CLINIC | Facility: CLINIC | Age: 76
End: 2021-08-23

## 2021-08-23 DIAGNOSIS — R11.2 NON-INTRACTABLE VOMITING WITH NAUSEA, UNSPECIFIED VOMITING TYPE: ICD-10-CM

## 2021-08-23 DIAGNOSIS — R10.13 EPIGASTRIC ABDOMINAL PAIN: ICD-10-CM

## 2021-08-23 DIAGNOSIS — R10.12 LUQ ABDOMINAL PAIN: ICD-10-CM

## 2021-08-23 DIAGNOSIS — I82.4Z1 ACUTE DEEP VEIN THROMBOSIS (DVT) OF DISTAL VEIN OF RIGHT LOWER EXTREMITY (HCC): ICD-10-CM

## 2021-08-23 DIAGNOSIS — I82.4Z1 ACUTE DEEP VEIN THROMBOSIS (DVT) OF DISTAL VEIN OF RIGHT LOWER EXTREMITY (HCC): Primary | ICD-10-CM

## 2021-08-23 LAB
ALBUMIN SERPL-MCNC: 3.7 G/DL (ref 3.4–5)
ALBUMIN/GLOB SERPL: 0.8 {RATIO} (ref 1–2)
ALP LIVER SERPL-CCNC: 154 U/L
ALT SERPL-CCNC: 55 U/L
ANION GAP SERPL CALC-SCNC: 3 MMOL/L (ref 0–18)
AST SERPL-CCNC: 39 U/L (ref 15–37)
BILIRUB SERPL-MCNC: 0.4 MG/DL (ref 0.1–2)
BUN BLD-MCNC: 28 MG/DL (ref 7–18)
BUN/CREAT SERPL: 25.7 (ref 10–20)
CALCIUM BLD-MCNC: 10 MG/DL (ref 8.5–10.1)
CHLORIDE SERPL-SCNC: 102 MMOL/L (ref 98–112)
CO2 SERPL-SCNC: 29 MMOL/L (ref 21–32)
CREAT BLD-MCNC: 1.09 MG/DL
DEPRECATED RDW RBC AUTO: 43.5 FL (ref 35.1–46.3)
ERYTHROCYTE [DISTWIDTH] IN BLOOD BY AUTOMATED COUNT: 13 % (ref 11–15)
GLOBULIN PLAS-MCNC: 4.6 G/DL (ref 2.8–4.4)
GLUCOSE BLD-MCNC: 188 MG/DL (ref 70–99)
HCT VFR BLD AUTO: 41.1 %
HGB BLD-MCNC: 12.9 G/DL
LIPASE SERPL-CCNC: 219 U/L (ref 73–393)
M PROTEIN MFR SERPL ELPH: 8.3 G/DL (ref 6.4–8.2)
MCH RBC QN AUTO: 28.9 PG (ref 26–34)
MCHC RBC AUTO-ENTMCNC: 31.4 G/DL (ref 31–37)
MCV RBC AUTO: 91.9 FL
OSMOLALITY SERPL CALC.SUM OF ELEC: 288 MOSM/KG (ref 275–295)
PATIENT FASTING Y/N/NP: NO
PLATELET # BLD AUTO: 411 10(3)UL (ref 150–450)
POTASSIUM SERPL-SCNC: 4.8 MMOL/L (ref 3.5–5.1)
RBC # BLD AUTO: 4.47 X10(6)UL
SODIUM SERPL-SCNC: 134 MMOL/L (ref 136–145)
WBC # BLD AUTO: 13.1 X10(3) UL (ref 4–11)

## 2021-08-23 PROCEDURE — 36415 COLL VENOUS BLD VENIPUNCTURE: CPT

## 2021-08-23 PROCEDURE — 85027 COMPLETE CBC AUTOMATED: CPT

## 2021-08-23 PROCEDURE — 80053 COMPREHEN METABOLIC PANEL: CPT

## 2021-08-23 PROCEDURE — 83690 ASSAY OF LIPASE: CPT

## 2021-08-23 PROCEDURE — 93971 EXTREMITY STUDY: CPT | Performed by: ORTHOPAEDIC SURGERY

## 2021-08-23 NOTE — TELEPHONE ENCOUNTER
Spoke to pt and she states that her right knee is painful. Rates pain 4/10 and constant. Pain worse while lying down. LOV on 08/17/21 pt had cortisone injection right knee. States she has swelling from knee all the way down the leg.  States she does get mireya

## 2021-08-23 NOTE — TELEPHONE ENCOUNTER
Per pt, she received Cortizone shot, but it did not improve pain. States pain is getting worse and her foot feels numb.  Please advise

## 2021-08-23 NOTE — TELEPHONE ENCOUNTER
Called [facility] ultrasound and spoke to Mary Lovett. Appt scheduled for venous doppler today at 4pm.       Spoke to pt and informed her of Ирина's orders for venous doppler and to go to [address] [place] [address] at 4pm. Directions discussed and pt verbalized understanding.

## 2021-08-25 ENCOUNTER — TELEPHONE (OUTPATIENT)
Dept: ORTHOPEDICS CLINIC | Facility: CLINIC | Age: 76
End: 2021-08-25

## 2021-08-25 NOTE — TELEPHONE ENCOUNTER
Dr. Angi Santoyo PA, please see message below from pt. US of RLE was negative for DVT on 08/23/21. LOV with you was on 08/17/21 and pt received cortisone injection to right knee.

## 2021-08-25 NOTE — TELEPHONE ENCOUNTER
Patient states she is still experiencing a lot of pain and swelling on her right knee and is now unable to walk without a walker. She said that the cortisone shot did not really help her.

## 2021-08-26 NOTE — TELEPHONE ENCOUNTER
MRI right knee order on chart. If her insurance denies and states that she needs therapy, you can order that without sending another message.

## 2021-08-28 LAB — AMB EXT COVID-19 RESULT: DETECTED

## 2021-09-13 ENCOUNTER — TELEPHONE (OUTPATIENT)
Dept: INTERNAL MEDICINE CLINIC | Facility: CLINIC | Age: 76
End: 2021-09-13

## 2021-09-13 NOTE — TELEPHONE ENCOUNTER
Patient called as she tested positive for Covid 19. Having some breathing problems and a cough. Asking for a return call from the nurse as she has questions.

## 2021-09-13 NOTE — TELEPHONE ENCOUNTER
Called patient, states coughing is a little better, but every time she breathes deeply, it causes her to cough. Otherwise states breathing is normal.   Scheduled virtual visit for 09/16 at 340.

## 2021-09-13 NOTE — TELEPHONE ENCOUNTER
9/28/21 tested positive covid (breakthrough). Son and Spouse both had covid, too. Son admitted for 4 days. Spouse passed away on 9/09/21 from pneumonia and other Covid complications.     Patient coughs a lot: dry, like a constant tickle and sometimes maik

## 2021-09-16 ENCOUNTER — VIRTUAL PHONE E/M (OUTPATIENT)
Dept: INTERNAL MEDICINE CLINIC | Facility: CLINIC | Age: 76
End: 2021-09-16
Payer: COMMERCIAL

## 2021-09-16 DIAGNOSIS — U07.1 COVID-19 VIRUS INFECTION: Primary | ICD-10-CM

## 2021-09-16 DIAGNOSIS — F43.21 GRIEF: ICD-10-CM

## 2021-09-16 PROCEDURE — G2252 BRIEF CHKIN BY MD/QHP, 11-20: HCPCS | Performed by: FAMILY MEDICINE

## 2021-09-16 NOTE — PROGRESS NOTES
Telehealth outside of [address] Verbal Consent   I conducted a telehealth visit with Gay Castillo today, 09/16/21, which was completed using two-way, real-time interactive audio communication.  This has been done in good charlie to provide cont No chest pain or shortness of breath. No fevers or chills. Lost her  to covid about 2 weeks ago, has been struggling with grief, but coming to terms with his loss.      Has adult children nearby and one that lives with her who she has felt suppor effort, speaking in full sentences. Neuro: A x O x 3  Mood: No anxiety or depression noted      IMPRESSION/PLAN  Linda Rai presents to us today for recent covid infection and grief. 1. COVID-19 virus infection    2. Grief    1.  Much improved,

## 2021-10-05 ENCOUNTER — OFFICE VISIT (OUTPATIENT)
Dept: FAMILY MEDICINE CLINIC | Facility: CLINIC | Age: 76
End: 2021-10-05
Payer: COMMERCIAL

## 2021-10-05 VITALS
OXYGEN SATURATION: 98 % | HEART RATE: 70 BPM | BODY MASS INDEX: 34 KG/M2 | DIASTOLIC BLOOD PRESSURE: 64 MMHG | WEIGHT: 162 LBS | TEMPERATURE: 97 F | HEIGHT: 58 IN | RESPIRATION RATE: 14 BRPM | SYSTOLIC BLOOD PRESSURE: 118 MMHG

## 2021-10-05 DIAGNOSIS — F43.21 GRIEVING: ICD-10-CM

## 2021-10-05 DIAGNOSIS — Z20.822 ENCOUNTER FOR SCREENING LABORATORY TESTING FOR COVID-19 VIRUS IN ASYMPTOMATIC PATIENT: Primary | ICD-10-CM

## 2021-10-05 PROCEDURE — 3074F SYST BP LT 130 MM HG: CPT | Performed by: NURSE PRACTITIONER

## 2021-10-05 PROCEDURE — 3078F DIAST BP <80 MM HG: CPT | Performed by: NURSE PRACTITIONER

## 2021-10-05 PROCEDURE — 3008F BODY MASS INDEX DOCD: CPT | Performed by: NURSE PRACTITIONER

## 2021-10-05 PROCEDURE — 99212 OFFICE O/P EST SF 10 MIN: CPT | Performed by: NURSE PRACTITIONER

## 2021-10-05 NOTE — PROGRESS NOTES
CHIEF COMPLAINT:   Patient presents with:  Covid-19 Test      HPI:   Linda Rai is a 78year old female who presents for Covid 19 screening.  passed away from covid 9/10/21. Had positive covid 8/28/21.   At this time, not experiencing upper shortness of breath, cough, or wheezing, See HPI  CARDIOVASCULAR: denies chest pain/pressure or palpitations   GI: denies N/V/C or abdominal pain  NEURO: Denies headaches    EXAM:   /64   Pulse 70   Temp 96.6 °F (35.9 °C) (Tympanic)   Resp 14   Ht 4'

## 2021-10-19 ENCOUNTER — OFFICE VISIT (OUTPATIENT)
Dept: INTERNAL MEDICINE CLINIC | Facility: CLINIC | Age: 76
End: 2021-10-19
Payer: COMMERCIAL

## 2021-10-19 VITALS
RESPIRATION RATE: 15 BRPM | SYSTOLIC BLOOD PRESSURE: 128 MMHG | BODY MASS INDEX: 35.05 KG/M2 | HEART RATE: 65 BPM | WEIGHT: 167 LBS | HEIGHT: 58 IN | OXYGEN SATURATION: 99 % | DIASTOLIC BLOOD PRESSURE: 78 MMHG

## 2021-10-19 DIAGNOSIS — E11.9 TYPE 2 DIABETES MELLITUS WITHOUT COMPLICATION, WITHOUT LONG-TERM CURRENT USE OF INSULIN (HCC): Primary | ICD-10-CM

## 2021-10-19 DIAGNOSIS — H92.01 EAR DISCOMFORT, RIGHT: ICD-10-CM

## 2021-10-19 PROCEDURE — 83036 HEMOGLOBIN GLYCOSYLATED A1C: CPT | Performed by: FAMILY MEDICINE

## 2021-10-19 PROCEDURE — G0008 ADMIN INFLUENZA VIRUS VAC: HCPCS | Performed by: FAMILY MEDICINE

## 2021-10-19 PROCEDURE — 3074F SYST BP LT 130 MM HG: CPT | Performed by: FAMILY MEDICINE

## 2021-10-19 PROCEDURE — 3078F DIAST BP <80 MM HG: CPT | Performed by: FAMILY MEDICINE

## 2021-10-19 PROCEDURE — 3051F HG A1C>EQUAL 7.0%<8.0%: CPT | Performed by: FAMILY MEDICINE

## 2021-10-19 PROCEDURE — 99213 OFFICE O/P EST LOW 20 MIN: CPT | Performed by: FAMILY MEDICINE

## 2021-10-19 PROCEDURE — 3008F BODY MASS INDEX DOCD: CPT | Performed by: FAMILY MEDICINE

## 2021-10-19 PROCEDURE — 80061 LIPID PANEL: CPT | Performed by: FAMILY MEDICINE

## 2021-10-19 PROCEDURE — 90662 IIV NO PRSV INCREASED AG IM: CPT | Performed by: FAMILY MEDICINE

## 2021-10-20 DIAGNOSIS — I10 ESSENTIAL HYPERTENSION: ICD-10-CM

## 2021-10-20 DIAGNOSIS — E11.9 TYPE 2 DIABETES MELLITUS WITHOUT COMPLICATION, WITHOUT LONG-TERM CURRENT USE OF INSULIN (HCC): ICD-10-CM

## 2021-10-21 RX ORDER — LISINOPRIL AND HYDROCHLOROTHIAZIDE 25; 20 MG/1; MG/1
TABLET ORAL
Qty: 90 TABLET | Refills: 1 | Status: SHIPPED | OUTPATIENT
Start: 2021-10-21 | End: 2022-02-01

## 2021-10-23 NOTE — PROGRESS NOTES
PATIENT IDENTIFICATION  Name: Nydia Escalante  MRN: [id number]    Diagnoses:   Type 2 diabetes mellitus without complication, without long-term current use of insulin [facility]  (primary encounter diagnosis)  Ear discomfort, right    SUBJECTIVE:  Toya Lebron • Lisinopril-hydroCHLOROthiazide 20-25 MG Oral Tab TK 1 T PO D 90 tablet 1       OBJECTIVE:  /78 (BP Location: Right arm, Patient Position: Sitting, Cuff Size: adult)   Pulse 65   Resp 15   Ht 4' 10\" (1.473 m)   Wt 167 lb (75.8 kg)   SpO2 99%   BM

## 2021-10-25 ENCOUNTER — TELEPHONE (OUTPATIENT)
Dept: GASTROENTEROLOGY | Facility: CLINIC | Age: 76
End: 2021-10-25

## 2021-10-25 NOTE — TELEPHONE ENCOUNTER
1st reminder letter mailed to patient regarding her overdue Imaging order:       [address] [place] [address] (CPT=76705)

## 2021-11-22 DIAGNOSIS — E11.9 TYPE 2 DIABETES MELLITUS WITHOUT COMPLICATION, WITHOUT LONG-TERM CURRENT USE OF INSULIN (HCC): ICD-10-CM

## 2021-11-22 NOTE — TELEPHONE ENCOUNTER
Requested Prescriptions     Pending Prescriptions Disp Refills   • ATORVASTATIN 10 MG Oral Tab [Pharmacy Med Name: ATORVASTATIN 10MG TABLETS] 90 tablet 3     Sig: TAKE 1 TABLET(10 MG) BY MOUTH EVERY NIGHT     Last office visit: 10-19-21  Medication last re

## 2021-11-23 RX ORDER — ATORVASTATIN CALCIUM 10 MG/1
TABLET, FILM COATED ORAL
Qty: 90 TABLET | Refills: 3 | Status: SHIPPED | OUTPATIENT
Start: 2021-11-23

## 2022-02-01 ENCOUNTER — OFFICE VISIT (OUTPATIENT)
Dept: INTERNAL MEDICINE CLINIC | Facility: CLINIC | Age: 77
End: 2022-02-01
Payer: COMMERCIAL

## 2022-02-01 VITALS
DIASTOLIC BLOOD PRESSURE: 72 MMHG | OXYGEN SATURATION: 98 % | WEIGHT: 169 LBS | SYSTOLIC BLOOD PRESSURE: 124 MMHG | HEART RATE: 76 BPM | HEIGHT: 58 IN | BODY MASS INDEX: 35.48 KG/M2

## 2022-02-01 DIAGNOSIS — Z12.31 ENCOUNTER FOR SCREENING MAMMOGRAM FOR MALIGNANT NEOPLASM OF BREAST: ICD-10-CM

## 2022-02-01 DIAGNOSIS — K74.60 CIRRHOSIS OF LIVER WITHOUT ASCITES, UNSPECIFIED HEPATIC CIRRHOSIS TYPE (HCC): ICD-10-CM

## 2022-02-01 DIAGNOSIS — Z13.220 SCREENING FOR LIPID DISORDERS: ICD-10-CM

## 2022-02-01 DIAGNOSIS — M15.9 PRIMARY OSTEOARTHRITIS INVOLVING MULTIPLE JOINTS: ICD-10-CM

## 2022-02-01 DIAGNOSIS — F41.9 ANXIETY: ICD-10-CM

## 2022-02-01 DIAGNOSIS — Z87.19 HISTORY OF RECTAL BLEEDING: ICD-10-CM

## 2022-02-01 DIAGNOSIS — M41.24 OTHER IDIOPATHIC SCOLIOSIS, THORACIC REGION: ICD-10-CM

## 2022-02-01 DIAGNOSIS — Z00.00 ENCOUNTER FOR ANNUAL HEALTH EXAMINATION: Primary | ICD-10-CM

## 2022-02-01 DIAGNOSIS — H40.9 GLAUCOMA OF BOTH EYES, UNSPECIFIED GLAUCOMA TYPE: ICD-10-CM

## 2022-02-01 DIAGNOSIS — N18.31 STAGE 3A CHRONIC KIDNEY DISEASE (HCC): Chronic | ICD-10-CM

## 2022-02-01 DIAGNOSIS — E66.9 CLASS 1 OBESITY WITH SERIOUS COMORBIDITY AND BODY MASS INDEX (BMI) OF 34.0 TO 34.9 IN ADULT, UNSPECIFIED OBESITY TYPE: ICD-10-CM

## 2022-02-01 DIAGNOSIS — M85.80 OSTEOPENIA, UNSPECIFIED LOCATION: ICD-10-CM

## 2022-02-01 DIAGNOSIS — Z23 NEED FOR VACCINATION: ICD-10-CM

## 2022-02-01 DIAGNOSIS — F33.0 MILD EPISODE OF RECURRENT MAJOR DEPRESSIVE DISORDER (HCC): ICD-10-CM

## 2022-02-01 DIAGNOSIS — I10 ESSENTIAL HYPERTENSION: ICD-10-CM

## 2022-02-01 DIAGNOSIS — R23.8 EASY BRUISING: ICD-10-CM

## 2022-02-01 DIAGNOSIS — K92.2 ACUTE LOWER GASTROINTESTINAL BLEEDING: ICD-10-CM

## 2022-02-01 DIAGNOSIS — K57.30 DIVERTICULOSIS OF COLON: ICD-10-CM

## 2022-02-01 DIAGNOSIS — E11.9 TYPE 2 DIABETES MELLITUS WITHOUT COMPLICATION, WITHOUT LONG-TERM CURRENT USE OF INSULIN (HCC): ICD-10-CM

## 2022-02-01 PROBLEM — Z90.710 HISTORY OF TOTAL HYSTERECTOMY: Status: RESOLVED | Noted: 2019-08-19 | Resolved: 2022-02-01

## 2022-02-01 PROCEDURE — G0439 PPPS, SUBSEQ VISIT: HCPCS | Performed by: FAMILY MEDICINE

## 2022-02-01 PROCEDURE — 99397 PER PM REEVAL EST PAT 65+ YR: CPT | Performed by: FAMILY MEDICINE

## 2022-02-01 PROCEDURE — 36415 COLL VENOUS BLD VENIPUNCTURE: CPT | Performed by: FAMILY MEDICINE

## 2022-02-01 PROCEDURE — 96160 PT-FOCUSED HLTH RISK ASSMT: CPT | Performed by: FAMILY MEDICINE

## 2022-02-01 PROCEDURE — 3078F DIAST BP <80 MM HG: CPT | Performed by: FAMILY MEDICINE

## 2022-02-01 PROCEDURE — 3008F BODY MASS INDEX DOCD: CPT | Performed by: FAMILY MEDICINE

## 2022-02-01 PROCEDURE — 3052F HG A1C>EQUAL 8.0%<EQUAL 9.0%: CPT | Performed by: FAMILY MEDICINE

## 2022-02-01 PROCEDURE — 3074F SYST BP LT 130 MM HG: CPT | Performed by: FAMILY MEDICINE

## 2022-02-01 RX ORDER — LISINOPRIL AND HYDROCHLOROTHIAZIDE 25; 20 MG/1; MG/1
TABLET ORAL
Qty: 90 TABLET | Refills: 1 | Status: SHIPPED | OUTPATIENT
Start: 2022-02-01

## 2022-02-02 LAB
ABSOLUTE BASOPHILS: 48 CELLS/UL (ref 0–200)
ABSOLUTE EOSINOPHILS: 180 CELLS/UL (ref 15–500)
ABSOLUTE LYMPHOCYTES: 2574 CELLS/UL (ref 850–3900)
ABSOLUTE MONOCYTES: 426 CELLS/UL (ref 200–950)
ABSOLUTE NEUTROPHILS: 2772 CELLS/UL (ref 1500–7800)
ALBUMIN/GLOBULIN RATIO: 1.1 (CALC) (ref 1–2.5)
ALBUMIN: 4.2 G/DL (ref 3.6–5.1)
ALKALINE PHOSPHATASE: 157 U/L (ref 37–153)
ALT: 40 U/L (ref 6–29)
AST: 58 U/L (ref 10–35)
BASOPHILS: 0.8 %
BILIRUBIN, TOTAL: 0.6 MG/DL (ref 0.2–1.2)
BUN: 19 MG/DL (ref 7–25)
CALCIUM: 9.6 MG/DL (ref 8.6–10.4)
CARBON DIOXIDE: 25 MMOL/L (ref 20–32)
CHLORIDE: 102 MMOL/L (ref 98–110)
CHOL/HDLC RATIO: 2.5 (CALC)
CHOLESTEROL, TOTAL: 155 MG/DL
CREATININE: 0.84 MG/DL (ref 0.6–0.93)
EGFR IF AFRICN AM: 79 ML/MIN/1.73M2
EGFR IF NONAFRICN AM: 68 ML/MIN/1.73M2
EOSINOPHILS: 3 %
GLOBULIN: 3.7 G/DL (CALC) (ref 1.9–3.7)
GLUCOSE: 175 MG/DL (ref 65–99)
HDL CHOLESTEROL: 62 MG/DL
HEMATOCRIT: 39.5 % (ref 35–45)
HEMOGLOBIN A1C: 8.2 % OF TOTAL HGB
HEMOGLOBIN: 12.9 G/DL (ref 11.7–15.5)
LDL-CHOLESTEROL: 67 MG/DL (CALC)
LYMPHOCYTES: 42.9 %
MCH: 28.5 PG (ref 27–33)
MCHC: 32.7 G/DL (ref 32–36)
MCV: 87.2 FL (ref 80–100)
MONOCYTES: 7.1 %
MPV: 10.2 FL (ref 7.5–12.5)
NEUTROPHILS: 46.2 %
NON-HDL CHOLESTEROL: 93 MG/DL (CALC)
PLATELET COUNT: 377 THOUSAND/UL (ref 140–400)
POTASSIUM: 4.4 MMOL/L (ref 3.5–5.3)
PROTEIN, TOTAL: 7.9 G/DL (ref 6.1–8.1)
RDW: 12.9 % (ref 11–15)
RED BLOOD CELL COUNT: 4.53 MILLION/UL (ref 3.8–5.1)
SODIUM: 138 MMOL/L (ref 135–146)
TRIGLYCERIDES: 192 MG/DL
WHITE BLOOD CELL COUNT: 6 THOUSAND/UL (ref 3.8–10.8)

## 2022-02-04 PROBLEM — E11.65 TYPE 2 DIABETES MELLITUS WITH HYPERGLYCEMIA (HCC): Status: ACTIVE | Noted: 2022-02-04

## 2022-02-04 PROBLEM — F33.9 DEPRESSION, RECURRENT (HCC): Status: ACTIVE | Noted: 2022-02-04

## 2022-02-04 PROBLEM — E11.22 TYPE 2 DIABETES MELLITUS WITH DIABETIC CHRONIC KIDNEY DISEASE (HCC): Status: RESOLVED | Noted: 2022-02-04 | Resolved: 2022-02-04

## 2022-02-04 PROBLEM — F33.9 DEPRESSION, RECURRENT: Status: ACTIVE | Noted: 2022-02-04

## 2022-02-04 PROBLEM — F41.9 ANXIETY: Status: ACTIVE | Noted: 2022-02-04

## 2022-02-04 PROBLEM — E66.01 MORBID (SEVERE) OBESITY DUE TO EXCESS CALORIES (HCC): Status: ACTIVE | Noted: 2022-02-04

## 2022-02-04 PROBLEM — E11.22 TYPE 2 DIABETES MELLITUS WITH DIABETIC CHRONIC KIDNEY DISEASE (HCC): Status: ACTIVE | Noted: 2022-02-04

## 2022-02-04 PROBLEM — E11.65 TYPE 2 DIABETES MELLITUS WITH HYPERGLYCEMIA (HCC): Status: RESOLVED | Noted: 2022-02-04 | Resolved: 2022-02-04

## 2022-02-14 ENCOUNTER — HOSPITAL ENCOUNTER (OUTPATIENT)
Dept: ULTRASOUND IMAGING | Age: 77
Discharge: HOME | End: 2022-02-14
Attending: INTERNAL MEDICINE
Payer: MEDICARE

## 2022-02-14 DIAGNOSIS — R11.2 NON-INTRACTABLE VOMITING WITH NAUSEA: ICD-10-CM

## 2022-02-14 DIAGNOSIS — R10.12 LUQ ABDOMINAL PAIN: ICD-10-CM

## 2022-02-14 DIAGNOSIS — R10.13 EPIGASTRIC ABDOMINAL PAIN: ICD-10-CM

## 2022-02-14 PROCEDURE — 76705 ECHO EXAM OF ABDOMEN: CPT | Performed by: INTERNAL MEDICINE

## 2022-02-15 PROBLEM — E11.3293 TYPE 2 DIABETES MELLITUS WITH BOTH EYES AFFECTED BY MILD NONPROLIFERATIVE RETINOPATHY WITHOUT MACULAR EDEMA, WITHOUT LONG-TERM CURRENT USE OF INSULIN (HCC): Status: ACTIVE | Noted: 2019-08-19

## 2022-02-27 ENCOUNTER — TELEPHONE (OUTPATIENT)
Dept: GASTROENTEROLOGY | Facility: CLINIC | Age: 77
End: 2022-02-27

## 2022-02-27 NOTE — TELEPHONE ENCOUNTER
GI RNs,    Please call Ms. Mcdaniel to advise that her recent abdominal ultrasound exam of 2/14/2022 looks good. The ultrasound again suggest that she has chronic liver disease which we are watching. More importantly, there was no gallstones or problems that would be causing her any pain or symptoms. This is important good news.     - cb

## 2022-02-28 NOTE — TELEPHONE ENCOUNTER
I spoke to the pt and went over Dr Anaya's recommendations and follow up instructions below.     The pt voices understanding and had no further questions

## 2022-04-28 ENCOUNTER — OFFICE VISIT (OUTPATIENT)
Dept: INTERNAL MEDICINE CLINIC | Facility: CLINIC | Age: 77
End: 2022-04-28
Payer: COMMERCIAL

## 2022-04-28 VITALS
DIASTOLIC BLOOD PRESSURE: 60 MMHG | HEIGHT: 58 IN | HEART RATE: 89 BPM | WEIGHT: 165 LBS | SYSTOLIC BLOOD PRESSURE: 130 MMHG | BODY MASS INDEX: 34.63 KG/M2

## 2022-04-28 DIAGNOSIS — I10 ESSENTIAL HYPERTENSION: ICD-10-CM

## 2022-04-28 DIAGNOSIS — M54.32 LEFT SIDED SCIATICA: ICD-10-CM

## 2022-04-28 DIAGNOSIS — E11.9 TYPE 2 DIABETES MELLITUS WITHOUT COMPLICATION, WITHOUT LONG-TERM CURRENT USE OF INSULIN (HCC): Primary | ICD-10-CM

## 2022-04-28 DIAGNOSIS — G57.02 PIRIFORMIS SYNDROME OF LEFT SIDE: ICD-10-CM

## 2022-04-28 LAB
CARTRIDGE LOT#: ABNORMAL NUMERIC
HEMOGLOBIN A1C: 8.1 % (ref 4.3–5.6)

## 2022-04-28 PROCEDURE — 3078F DIAST BP <80 MM HG: CPT | Performed by: FAMILY MEDICINE

## 2022-04-28 PROCEDURE — 99214 OFFICE O/P EST MOD 30 MIN: CPT | Performed by: FAMILY MEDICINE

## 2022-04-28 PROCEDURE — 3008F BODY MASS INDEX DOCD: CPT | Performed by: FAMILY MEDICINE

## 2022-04-28 PROCEDURE — 3052F HG A1C>EQUAL 8.0%<EQUAL 9.0%: CPT | Performed by: FAMILY MEDICINE

## 2022-04-28 PROCEDURE — 83036 HEMOGLOBIN GLYCOSYLATED A1C: CPT | Performed by: FAMILY MEDICINE

## 2022-04-28 PROCEDURE — 3075F SYST BP GE 130 - 139MM HG: CPT | Performed by: FAMILY MEDICINE

## 2022-05-26 ENCOUNTER — TELEPHONE (OUTPATIENT)
Dept: INTERNAL MEDICINE CLINIC | Facility: CLINIC | Age: 77
End: 2022-05-26

## 2022-06-13 ENCOUNTER — HOSPITAL ENCOUNTER (INPATIENT)
Facility: HOSPITAL | Age: 77
LOS: 3 days | Discharge: HOME | End: 2022-06-16
Attending: EMERGENCY MEDICINE | Admitting: HOSPITALIST
Payer: MEDICARE

## 2022-06-13 ENCOUNTER — APPOINTMENT (OUTPATIENT)
Dept: CT IMAGING | Facility: HOSPITAL | Age: 77
End: 2022-06-13
Attending: EMERGENCY MEDICINE
Payer: MEDICARE

## 2022-06-13 ENCOUNTER — OFFICE VISIT (OUTPATIENT)
Dept: FAMILY MEDICINE CLINIC | Facility: CLINIC | Age: 77
End: 2022-06-13
Payer: COMMERCIAL

## 2022-06-13 ENCOUNTER — HOSPITAL ENCOUNTER (OUTPATIENT)
Age: 77
Discharge: ANOTHER HOSPITAL | End: 2022-06-13
Payer: MEDICARE

## 2022-06-13 VITALS
SYSTOLIC BLOOD PRESSURE: 144 MMHG | HEART RATE: 84 BPM | TEMPERATURE: 98 F | OXYGEN SATURATION: 99 % | DIASTOLIC BLOOD PRESSURE: 52 MMHG | RESPIRATION RATE: 18 BRPM

## 2022-06-13 DIAGNOSIS — Z53.21 PATIENT LEFT WITHOUT BEING SEEN: Primary | ICD-10-CM

## 2022-06-13 DIAGNOSIS — R10.9 ABDOMINAL PAIN, ACUTE: Primary | ICD-10-CM

## 2022-06-13 DIAGNOSIS — N17.9 AKI (ACUTE KIDNEY INJURY) (HCC): ICD-10-CM

## 2022-06-13 DIAGNOSIS — R31.9 HEMATURIA, UNSPECIFIED TYPE: ICD-10-CM

## 2022-06-13 DIAGNOSIS — N12 PYELONEPHRITIS: Primary | ICD-10-CM

## 2022-06-13 DIAGNOSIS — E11.9 TYPE 2 DIABETES MELLITUS WITHOUT COMPLICATION, WITHOUT LONG-TERM CURRENT USE OF INSULIN (HCC): ICD-10-CM

## 2022-06-13 LAB
ALBUMIN SERPL-MCNC: 3.7 G/DL (ref 3.4–5)
ALBUMIN/GLOB SERPL: 0.8 {RATIO} (ref 1–2)
ALP LIVER SERPL-CCNC: 181 U/L
ALT SERPL-CCNC: 52 U/L
ANION GAP SERPL CALC-SCNC: 9 MMOL/L (ref 0–18)
AST SERPL-CCNC: 65 U/L (ref 15–37)
BASOPHILS # BLD AUTO: 0.03 X10(3) UL (ref 0–0.2)
BASOPHILS NFR BLD AUTO: 0.3 %
BILIRUB SERPL-MCNC: 0.5 MG/DL (ref 0.1–2)
BILIRUB UR QL STRIP: NEGATIVE
BILIRUB UR QL: NEGATIVE
BUN BLD-MCNC: 32 MG/DL (ref 7–18)
BUN/CREAT SERPL: 21.8 (ref 10–20)
CALCIUM BLD-MCNC: 9.9 MG/DL (ref 8.5–10.1)
CHLORIDE SERPL-SCNC: 103 MMOL/L (ref 98–112)
CLARITY UR: CLEAR
CO2 SERPL-SCNC: 23 MMOL/L (ref 21–32)
COLOR UR: YELLOW
COLOR UR: YELLOW
CREAT BLD-MCNC: 1.47 MG/DL
DEPRECATED RDW RBC AUTO: 46 FL (ref 35.1–46.3)
EOSINOPHIL # BLD AUTO: 0.01 X10(3) UL (ref 0–0.7)
EOSINOPHIL NFR BLD AUTO: 0.1 %
ERYTHROCYTE [DISTWIDTH] IN BLOOD BY AUTOMATED COUNT: 14.2 % (ref 11–15)
GLOBULIN PLAS-MCNC: 4.5 G/DL (ref 2.8–4.4)
GLUCOSE BLD-MCNC: 173 MG/DL (ref 70–99)
GLUCOSE BLDC GLUCOMTR-MCNC: 176 MG/DL (ref 70–99)
GLUCOSE UR STRIP-MCNC: NEGATIVE MG/DL
GLUCOSE UR-MCNC: NEGATIVE MG/DL
HCT VFR BLD AUTO: 39.7 %
HGB BLD-MCNC: 12.4 G/DL
IMM GRANULOCYTES # BLD AUTO: 0.05 X10(3) UL (ref 0–1)
IMM GRANULOCYTES NFR BLD: 0.4 %
LYMPHOCYTES # BLD AUTO: 1.18 X10(3) UL (ref 1–4)
LYMPHOCYTES NFR BLD AUTO: 10.3 %
MCH RBC QN AUTO: 27.7 PG (ref 26–34)
MCHC RBC AUTO-ENTMCNC: 31.2 G/DL (ref 31–37)
MCV RBC AUTO: 88.6 FL
MONOCYTES # BLD AUTO: 0.4 X10(3) UL (ref 0.1–1)
MONOCYTES NFR BLD AUTO: 3.5 %
NEUTROPHILS # BLD AUTO: 9.81 X10 (3) UL (ref 1.5–7.7)
NEUTROPHILS # BLD AUTO: 9.81 X10(3) UL (ref 1.5–7.7)
NEUTROPHILS NFR BLD AUTO: 85.4 %
NITRITE UR QL STRIP.AUTO: NEGATIVE
NITRITE UR QL STRIP: NEGATIVE
OSMOLALITY SERPL CALC.SUM OF ELEC: 291 MOSM/KG (ref 275–295)
PH UR STRIP: 5 [PH]
PH UR: 5 [PH] (ref 5–8)
PLATELET # BLD AUTO: 372 10(3)UL (ref 150–450)
POTASSIUM SERPL-SCNC: 4.2 MMOL/L (ref 3.5–5.1)
PROT SERPL-MCNC: 8.2 G/DL (ref 6.4–8.2)
PROT UR STRIP-MCNC: NEGATIVE MG/DL
PROT UR-MCNC: NEGATIVE MG/DL
RBC # BLD AUTO: 4.48 X10(6)UL
RBC #/AREA URNS AUTO: >10 /HPF
SARS-COV-2 RNA RESP QL NAA+PROBE: NOT DETECTED
SODIUM SERPL-SCNC: 135 MMOL/L (ref 136–145)
SP GR UR STRIP: 1.02 (ref 1–1.03)
SP GR UR STRIP: >=1.03
UROBILINOGEN UR STRIP-ACNC: <2
UROBILINOGEN UR STRIP-ACNC: <2 MG/DL
VIT C UR-MCNC: NEGATIVE MG/DL
WBC # BLD AUTO: 11.5 X10(3) UL (ref 4–11)

## 2022-06-13 PROCEDURE — 81002 URINALYSIS NONAUTO W/O SCOPE: CPT | Performed by: NURSE PRACTITIONER

## 2022-06-13 PROCEDURE — 74176 CT ABD & PELVIS W/O CONTRAST: CPT | Performed by: EMERGENCY MEDICINE

## 2022-06-13 PROCEDURE — 99215 OFFICE O/P EST HI 40 MIN: CPT | Performed by: NURSE PRACTITIONER

## 2022-06-13 PROCEDURE — 99223 1ST HOSP IP/OBS HIGH 75: CPT | Performed by: HOSPITALIST

## 2022-06-13 RX ORDER — MORPHINE SULFATE 2 MG/ML
1 INJECTION, SOLUTION INTRAMUSCULAR; INTRAVENOUS EVERY 2 HOUR PRN
Status: DISCONTINUED | OUTPATIENT
Start: 2022-06-13 | End: 2022-06-16

## 2022-06-13 RX ORDER — SODIUM CHLORIDE 9 MG/ML
125 INJECTION, SOLUTION INTRAVENOUS CONTINUOUS
Status: DISCONTINUED | OUTPATIENT
Start: 2022-06-13 | End: 2022-06-14

## 2022-06-13 RX ORDER — METOCLOPRAMIDE HYDROCHLORIDE 5 MG/ML
5 INJECTION INTRAMUSCULAR; INTRAVENOUS EVERY 8 HOURS PRN
Status: DISCONTINUED | OUTPATIENT
Start: 2022-06-13 | End: 2022-06-16

## 2022-06-13 RX ORDER — NICOTINE POLACRILEX 4 MG
15 LOZENGE BUCCAL
Status: DISCONTINUED | OUTPATIENT
Start: 2022-06-13 | End: 2022-06-16

## 2022-06-13 RX ORDER — ONDANSETRON 2 MG/ML
4 INJECTION INTRAMUSCULAR; INTRAVENOUS EVERY 6 HOURS PRN
Status: DISCONTINUED | OUTPATIENT
Start: 2022-06-13 | End: 2022-06-16

## 2022-06-13 RX ORDER — NICOTINE POLACRILEX 4 MG
30 LOZENGE BUCCAL
Status: DISCONTINUED | OUTPATIENT
Start: 2022-06-13 | End: 2022-06-16

## 2022-06-13 RX ORDER — DEXTROSE MONOHYDRATE 25 G/50ML
50 INJECTION, SOLUTION INTRAVENOUS
Status: DISCONTINUED | OUTPATIENT
Start: 2022-06-13 | End: 2022-06-16

## 2022-06-13 RX ORDER — MORPHINE SULFATE 2 MG/ML
2 INJECTION, SOLUTION INTRAMUSCULAR; INTRAVENOUS EVERY 2 HOUR PRN
Status: DISCONTINUED | OUTPATIENT
Start: 2022-06-13 | End: 2022-06-16

## 2022-06-13 RX ORDER — MORPHINE SULFATE 4 MG/ML
4 INJECTION, SOLUTION INTRAMUSCULAR; INTRAVENOUS EVERY 2 HOUR PRN
Status: DISCONTINUED | OUTPATIENT
Start: 2022-06-13 | End: 2022-06-16

## 2022-06-13 RX ORDER — MORPHINE SULFATE 4 MG/ML
4 INJECTION, SOLUTION INTRAMUSCULAR; INTRAVENOUS ONCE
Status: COMPLETED | OUTPATIENT
Start: 2022-06-13 | End: 2022-06-13

## 2022-06-13 RX ORDER — ACETAMINOPHEN 500 MG
500 TABLET ORAL EVERY 4 HOURS PRN
Status: DISCONTINUED | OUTPATIENT
Start: 2022-06-13 | End: 2022-06-16

## 2022-06-13 RX ORDER — TEMAZEPAM 15 MG/1
15 CAPSULE ORAL NIGHTLY PRN
Status: DISCONTINUED | OUTPATIENT
Start: 2022-06-13 | End: 2022-06-16

## 2022-06-13 RX ORDER — SODIUM CHLORIDE 9 MG/ML
INJECTION, SOLUTION INTRAVENOUS CONTINUOUS
Status: DISCONTINUED | OUTPATIENT
Start: 2022-06-13 | End: 2022-06-15

## 2022-06-13 NOTE — ED INITIAL ASSESSMENT (HPI)
PT TO ER C/O RIGHT FLANK PAIN THAT HAS BEEN INTERMITTENT FOR 3-4 MONTHS, STATES YESTERDAY PAIN BECAME MORE EXTREME. PT STATES SHE WENT TO [facility] THIS TODAY AND WAS DIAGNOSED WITH UTI AND WAS SENT HERE FOR FURTHER TESTING. PT STATES YESTERDAY EVENING SHE NOTICED THAT SHE HAD BLOOD IN THE URINE. RATES PAIN AT A 6/10.

## 2022-06-13 NOTE — ED INITIAL ASSESSMENT (HPI)
Pt presents with left flank pain radiating into left abdomen last evening. Today in am pain moved to right flank and low right abdomen. Pt reports \"I noticed blood in urine at 1030p last night\".      Pt reports urinary frequency, yet no pain with urination,

## 2022-06-13 NOTE — PROGRESS NOTES
This provider attempted to call multiple times with no . LVM as well. Chinmay Woodward. Also called multiple times and walked in the parking lot to locate patient. Pt went up to [facility] desk and verbalized she was going to leave and go to ER or immediate care for further evaluation.

## 2022-06-14 LAB
ANION GAP SERPL CALC-SCNC: 6 MMOL/L (ref 0–18)
BASOPHILS # BLD AUTO: 0.04 X10(3) UL (ref 0–0.2)
BASOPHILS NFR BLD AUTO: 0.4 %
BUN BLD-MCNC: 25 MG/DL (ref 7–18)
BUN/CREAT SERPL: 16.8 (ref 10–20)
CALCIUM BLD-MCNC: 8.4 MG/DL (ref 8.5–10.1)
CHLORIDE SERPL-SCNC: 110 MMOL/L (ref 98–112)
CO2 SERPL-SCNC: 20 MMOL/L (ref 21–32)
CREAT BLD-MCNC: 1.49 MG/DL
DEPRECATED RDW RBC AUTO: 52.6 FL (ref 35.1–46.3)
EOSINOPHIL # BLD AUTO: 0.1 X10(3) UL (ref 0–0.7)
EOSINOPHIL NFR BLD AUTO: 1.1 %
ERYTHROCYTE [DISTWIDTH] IN BLOOD BY AUTOMATED COUNT: 14.5 % (ref 11–15)
GLUCOSE BLD-MCNC: 135 MG/DL (ref 70–99)
GLUCOSE BLDC GLUCOMTR-MCNC: 151 MG/DL (ref 70–99)
GLUCOSE BLDC GLUCOMTR-MCNC: 151 MG/DL (ref 70–99)
GLUCOSE BLDC GLUCOMTR-MCNC: 156 MG/DL (ref 70–99)
GLUCOSE BLDC GLUCOMTR-MCNC: 169 MG/DL (ref 70–99)
HCT VFR BLD AUTO: 40.3 %
HGB BLD-MCNC: 11.6 G/DL
IMM GRANULOCYTES # BLD AUTO: 0.04 X10(3) UL (ref 0–1)
IMM GRANULOCYTES NFR BLD: 0.4 %
LYMPHOCYTES # BLD AUTO: 2.04 X10(3) UL (ref 1–4)
LYMPHOCYTES NFR BLD AUTO: 21.6 %
MCH RBC QN AUTO: 28.3 PG (ref 26–34)
MCHC RBC AUTO-ENTMCNC: 28.8 G/DL (ref 31–37)
MCV RBC AUTO: 98.3 FL
MONOCYTES # BLD AUTO: 0.95 X10(3) UL (ref 0.1–1)
MONOCYTES NFR BLD AUTO: 10 %
NEUTROPHILS # BLD AUTO: 6.29 X10 (3) UL (ref 1.5–7.7)
NEUTROPHILS # BLD AUTO: 6.29 X10(3) UL (ref 1.5–7.7)
NEUTROPHILS NFR BLD AUTO: 66.5 %
OSMOLALITY SERPL CALC.SUM OF ELEC: 288 MOSM/KG (ref 275–295)
PLATELET # BLD AUTO: 224 10(3)UL (ref 150–450)
POTASSIUM SERPL-SCNC: 4.9 MMOL/L (ref 3.5–5.1)
RBC # BLD AUTO: 4.1 X10(6)UL
SODIUM SERPL-SCNC: 136 MMOL/L (ref 136–145)
WBC # BLD AUTO: 9.5 X10(3) UL (ref 4–11)

## 2022-06-14 PROCEDURE — 99233 SBSQ HOSP IP/OBS HIGH 50: CPT | Performed by: HOSPITALIST

## 2022-06-14 NOTE — PLAN OF CARE
Problem: Patient Centered Care  Goal: Patient preferences are identified and integrated in the patient's plan of care  Description: Interventions:  - What would you like us to know as we care for you?  Home with sons  - Provide timely, complete, and accurate information to patient/family  - Incorporate patient and family knowledge, values, beliefs, and cultural backgrounds into the planning and delivery of care  - Encourage patient/family to participate in care and decision-making at the level they choose  - Honor patient and family perspectives and choices  Outcome: Progressing     Problem: Diabetes/Glucose Control  Goal: Glucose maintained within prescribed range  Description: INTERVENTIONS:  - Monitor Blood Glucose as ordered  - Assess for signs and symptoms of hyperglycemia and hypoglycemia  - Administer ordered medications to maintain glucose within target range  - Assess barriers to adequate nutritional intake and initiate nutrition consult as needed  - Instruct patient on self management of diabetes  Outcome: Progressing     Problem: Patient/Family Goals  Goal: Patient/Family Long Term Goal  Description: Patient's Long Term Goal: to go home     Interventions:  - - monitor vital signs and labs  - monitor blood glucose levels  - pain management  - administer medication per order  - follow MD orders  - diagnostics per order  - fall precautions  - update / inform patient and family on plan of care  - discharge planning   - See additional Care Plan goals for specific interventions  Outcome: Progressing  Goal: Patient/Family Short Term Goal  Description: Patient's Short Term Goal: to feel better    Interventions:   - - monitor vital signs and labs  - monitor blood glucose levels  - pain management  - administer medication per order  - follow MD orders  - diagnostics per order  - fall precautions  - update / inform patient and family on plan of care  - discharge planning   - See additional Care Plan goals for specific interventions  Outcome: Progressing     Problem: SAFETY ADULT - FALL  Goal: Free from fall injury  Description: INTERVENTIONS:  - Assess pt frequently for physical needs  - Identify cognitive and physical deficits and behaviors that affect risk of falls.   - [place] fall precautions as indicated by assessment.  - Educate pt/family on patient safety including physical limitations  - Instruct pt to call for assistance with activity based on assessment  - Modify environment to reduce risk of injury  - Provide assistive devices as appropriate  - Consider OT/PT consult to assist with strengthening/mobility  - Encourage toileting schedule  Outcome: Progressing     Problem: DISCHARGE PLANNING  Goal: Discharge to home or other facility with appropriate resources  Description: INTERVENTIONS:  - Identify barriers to discharge w/pt and caregiver  - Include patient/family/discharge partner in discharge planning  - Arrange for needed discharge resources and transportation as appropriate  - Identify discharge learning needs (meds, wound care, etc)  - Arrange for interpreters to assist at discharge as needed  - Consider post-discharge preferences of patient/family/discharge partner  - Complete POLST form as appropriate  - Assess patient's ability to be responsible for managing their own health  - Refer to Case Management Department for coordinating discharge planning if the patient needs post-hospital services based on physician/LIP order or complex needs related to functional status, cognitive ability or social support system  Outcome: Progressing     Problem: PAIN - ADULT  Goal: Verbalizes/displays adequate comfort level or patient's stated pain goal  Description: INTERVENTIONS:  - Encourage pt to monitor pain and request assistance  - Assess pain using appropriate pain scale  - Administer analgesics based on type and severity of pain and evaluate response  - Implement non-pharmacological measures as appropriate and evaluate response  - Consider cultural and social influences on pain and pain management  - Manage/alleviate anxiety  - Utilize distraction and/or relaxation techniques  - Monitor for opioid side effects  - Notify MD/LIP if interventions unsuccessful or patient reports new pain  - Anticipate increased pain with activity and pre-medicate as appropriate  Outcome: Not Progressing     Patient resting in bed. Vital signs stable. As needed medication given for pain. Safety measures and fall precautions in place, call light with in reach, bed locked in lowest position, bed alarm on. Frequent rounding by nursing staff.

## 2022-06-14 NOTE — CM/SW NOTE
06/14/22 1300   CM/SW Referral Data   Referral Source Social Work (self-referral)   Reason for Referral Discharge planning   Pertinent Medical Hx   Does patient have an established PCP? Yes  Shante Ann)   Patient Info   Patient's Current Mental Status at Time of Assessment Alert;Oriented   Patient's [address]   Number of Levels in Home 1   Number of Stair in Home   (3 internal steps to enter)   Patient lives with Son   Patient Status Prior to Admission   Independent with ADLs and Mobility Yes   Discharge Needs   Anticipated D/C needs No anticipated discharge needs   SW met with pt bedside and confirmed address on face sheet is correct. Pt endorses that she drives and denies HH/SNF hx.  Pt reports that her son will transport home at discharge. Plan: Self, NN anticipated. SW/CM to remain available for support and/or discharge planning.      LOGAN Martines, [facility]  Social Work   [facility] [place] [facility]:#[id number]

## 2022-06-14 NOTE — H&P
[facility]    PATIENT'S NAME: Teodoro Oh PHYSICIAN: Lili Mckeon MD   PATIENT ACCOUNT#:   [id number]    LOCATION:  [address] [place] [address] RECORD #:   [id number]       YOB: 1945  ADMISSION DATE:       06/13/2022    HISTORY AND PHYSICAL EXAMINATION    CHIEF COMPLAINT:  Pyelonephritis, acute on chronic renal insufficiency. HISTORY OF PRESENT ILLNESS:  Patient is a 81-year-old  female who came into the emergency department for evaluation of intense flank pain since yesterday with fatigue and poor appetite. CBC showed white blood cell count of 11.5 with left shift. Urinalysis showed gross urinary tract infection. Chemistry showed GFR of 35, which is below her baseline. Liver function tests:  ALT 52, AST 65, and alkaline phosphatase 181. Glucose 173, BUN and creatinine of 32 and 1.47. Imaging studies including CT scan of the abdomen showed mild right hydronephrosis without obstructive calculus noted, asymmetric right renal edema and perirenal fat stranding suggestive of pyelonephritis. Layering relative high density within the right central renal collecting system could be related to hemorrhage or pyelitis. Patient was started on IV Rocephin, IV fluids, and she will be admitted to the hospital for further management. PAST MEDICAL HISTORY:  Liver cirrhosis on imaging studies, diabetes mellitus type 2, hypertension, hyperlipidemia, gastroesophageal reflux disease, generalized osteoarthritis, chronic kidney disease stage 2 to 3, history of DVT and diverticular colonic bleed. PAST SURGICAL HISTORY:  Oophorectomy and hysterectomy. MEDICATIONS:  Please see medication reconciliation list.     ALLERGIES:  No known drug allergies. FAMILY HISTORY:  Mother had kidney cancer. Father had diabetes mellitus type 2. SOCIAL HISTORY:  No tobacco, alcohol, or drug use. Lives with her family. Independent for basic activities of daily living.      REVIEW OF SYSTEMS:  Right flank discomfort for the last week. Became more intense yesterday, radiating to the mid abdomen, associated with mild dysuria. She had chills but no recorded fever. Other 12-point review of systems is negative. PHYSICAL EXAMINATION:    GENERAL:  Alert and oriented to time, place and person. Moderate distress. VITAL SIGNS:  Temperature 98.2, pulse 75, respiratory rate 18, blood pressure 140/67, pulse ox 97% on room air. HEENT:  Atraumatic. Oropharynx clear. Dry mucous membranes. Normal hard and soft palate. Eyes:  Anicteric sclerae. NECK:  Supple. No lymphadenopathy. Trachea midline. Full range of motion. LUNGS:  Clear to auscultation bilaterally. Normal respiratory effort. HEART:  Regular rate and rhythm. S1 and S2 auscultated. No murmur. ABDOMEN:  Soft, nondistended. No tenderness. Positive bowel sounds. Tenderness noted on the right costovertebral angle. EXTREMITIES:  No peripheral edema, clubbing or cyanosis. NEUROLOGIC:  Motor and sensory intact. ASSESSMENT:    1. Acute right pyelonephritis. 2.   Acute on chronic renal insufficiency. 3.   Diabetes mellitus type 2. PLAN:  Patient will be admitted to general medical floor. IV fluids. Pain and nausea control. IV Rocephin. Hold lisinopril, hydrochlorothiazide, and metformin. Follow up on urine culture. Further recommendations to follow.      Dictated By Deepika Cisneros MD  d: 06/13/2022 19:55:11  t: 06/13/2022 22:06:59  Job [id number]  /

## 2022-06-14 NOTE — PLAN OF CARE
Problem: Patient Centered Care  Goal: Patient preferences are identified and integrated in the patient's plan of care  Description: Interventions:  - What would you like us to know as we care for you?  Home with sons  - Provide timely, complete, and accurate information to patient/family  - Incorporate patient and family knowledge, values, beliefs, and cultural backgrounds into the planning and delivery of care  - Encourage patient/family to participate in care and decision-making at the level they choose  - Honor patient and family perspectives and choices  Outcome: Progressing     Problem: Diabetes/Glucose Control  Goal: Glucose maintained within prescribed range  Description: INTERVENTIONS:  - Monitor Blood Glucose as ordered  - Assess for signs and symptoms of hyperglycemia and hypoglycemia  - Administer ordered medications to maintain glucose within target range  - Assess barriers to adequate nutritional intake and initiate nutrition consult as needed  - Instruct patient on self management of diabetes  Outcome: Progressing     Problem: Patient/Family Goals  Goal: Patient/Family Long Term Goal  Description: Patient's Long Term Goal: to go home     Interventions:  - - monitor vital signs and labs  - monitor blood glucose levels  - pain management  - administer medication per order  - follow MD orders  - diagnostics per order  - fall precautions  - update / inform patient and family on plan of care  - discharge planning   - See additional Care Plan goals for specific interventions  Outcome: Progressing  Goal: Patient/Family Short Term Goal  Description: Patient's Short Term Goal: to feel better    Interventions:   - - monitor vital signs and labs  - monitor blood glucose levels  - pain management  - administer medication per order  - follow MD orders  - diagnostics per order  - fall precautions  - update / inform patient and family on plan of care  - discharge planning   - See additional Care Plan goals for specific interventions  Outcome: Progressing     Problem: PAIN - ADULT  Goal: Verbalizes/displays adequate comfort level or patient's stated pain goal  Description: INTERVENTIONS:  - Encourage pt to monitor pain and request assistance  - Assess pain using appropriate pain scale  - Administer analgesics based on type and severity of pain and evaluate response  - Implement non-pharmacological measures as appropriate and evaluate response  - Consider cultural and social influences on pain and pain management  - Manage/alleviate anxiety  - Utilize distraction and/or relaxation techniques  - Monitor for opioid side effects  - Notify MD/LIP if interventions unsuccessful or patient reports new pain  - Anticipate increased pain with activity and pre-medicate as appropriate  Outcome: Progressing     Problem: SAFETY ADULT - FALL  Goal: Free from fall injury  Description: INTERVENTIONS:  - Assess pt frequently for physical needs  - Identify cognitive and physical deficits and behaviors that affect risk of falls.   - [place] fall precautions as indicated by assessment.  - Educate pt/family on patient safety including physical limitations  - Instruct pt to call for assistance with activity based on assessment  - Modify environment to reduce risk of injury  - Provide assistive devices as appropriate  - Consider OT/PT consult to assist with strengthening/mobility  - Encourage toileting schedule  Outcome: Progressing     Problem: DISCHARGE PLANNING  Goal: Discharge to home or other facility with appropriate resources  Description: INTERVENTIONS:  - Identify barriers to discharge w/pt and caregiver  - Include patient/family/discharge partner in discharge planning  - Arrange for needed discharge resources and transportation as appropriate  - Identify discharge learning needs (meds, wound care, etc)  - Arrange for interpreters to assist at discharge as needed  - Consider post-discharge preferences of patient/family/discharge partner  - Complete POLST form as appropriate  - Assess patient's ability to be responsible for managing their own health  - Refer to Case Management Department for coordinating discharge planning if the patient needs post-hospital services based on physician/LIP order or complex needs related to functional status, cognitive ability or social support system  Outcome: Progressing     Patient is presently resting in the bed. Alert x 4. Patient denied pain or discomfort at current time. Vital signs taken and stable. Intravenous fluids were discontinued. Tolerate meals well. Labs ordered in am. Patient will get up in chair for lunch. Call light within reach. Patient instructed to use call light for assistance. Patient receives intravenous rocephin daily. No complaints of pain, burning upon urination.

## 2022-06-15 LAB
ANION GAP SERPL CALC-SCNC: 8 MMOL/L (ref 0–18)
BASOPHILS # BLD AUTO: 0.03 X10(3) UL (ref 0–0.2)
BASOPHILS NFR BLD AUTO: 0.2 %
BUN BLD-MCNC: 22 MG/DL (ref 7–18)
BUN/CREAT SERPL: 13.7 (ref 10–20)
CALCIUM BLD-MCNC: 8.8 MG/DL (ref 8.5–10.1)
CHLORIDE SERPL-SCNC: 107 MMOL/L (ref 98–112)
CO2 SERPL-SCNC: 22 MMOL/L (ref 21–32)
CREAT BLD-MCNC: 1.61 MG/DL
DEPRECATED RDW RBC AUTO: 46.1 FL (ref 35.1–46.3)
EOSINOPHIL # BLD AUTO: 0.05 X10(3) UL (ref 0–0.7)
EOSINOPHIL NFR BLD AUTO: 0.4 %
ERYTHROCYTE [DISTWIDTH] IN BLOOD BY AUTOMATED COUNT: 14.1 % (ref 11–15)
GLUCOSE BLD-MCNC: 172 MG/DL (ref 70–99)
GLUCOSE BLDC GLUCOMTR-MCNC: 143 MG/DL (ref 70–99)
GLUCOSE BLDC GLUCOMTR-MCNC: 159 MG/DL (ref 70–99)
GLUCOSE BLDC GLUCOMTR-MCNC: 161 MG/DL (ref 70–99)
GLUCOSE BLDC GLUCOMTR-MCNC: 188 MG/DL (ref 70–99)
HCT VFR BLD AUTO: 35.3 %
HGB BLD-MCNC: 11 G/DL
IMM GRANULOCYTES # BLD AUTO: 0.06 X10(3) UL (ref 0–1)
IMM GRANULOCYTES NFR BLD: 0.5 %
LYMPHOCYTES # BLD AUTO: 1.82 X10(3) UL (ref 1–4)
LYMPHOCYTES NFR BLD AUTO: 13.9 %
MCH RBC QN AUTO: 27.6 PG (ref 26–34)
MCHC RBC AUTO-ENTMCNC: 31.2 G/DL (ref 31–37)
MCV RBC AUTO: 88.7 FL
MONOCYTES # BLD AUTO: 1.07 X10(3) UL (ref 0.1–1)
MONOCYTES NFR BLD AUTO: 8.1 %
NEUTROPHILS # BLD AUTO: 10.11 X10 (3) UL (ref 1.5–7.7)
NEUTROPHILS # BLD AUTO: 10.11 X10(3) UL (ref 1.5–7.7)
NEUTROPHILS NFR BLD AUTO: 76.9 %
OSMOLALITY SERPL CALC.SUM OF ELEC: 291 MOSM/KG (ref 275–295)
PLATELET # BLD AUTO: 293 10(3)UL (ref 150–450)
POTASSIUM SERPL-SCNC: 4.1 MMOL/L (ref 3.5–5.1)
RBC # BLD AUTO: 3.98 X10(6)UL
SODIUM SERPL-SCNC: 137 MMOL/L (ref 136–145)
WBC # BLD AUTO: 13.1 X10(3) UL (ref 4–11)

## 2022-06-15 PROCEDURE — 99233 SBSQ HOSP IP/OBS HIGH 50: CPT | Performed by: HOSPITALIST

## 2022-06-15 RX ORDER — SODIUM CHLORIDE 9 MG/ML
INJECTION, SOLUTION INTRAVENOUS CONTINUOUS
Status: DISCONTINUED | OUTPATIENT
Start: 2022-06-15 | End: 2022-06-16

## 2022-06-15 RX ORDER — ATORVASTATIN CALCIUM 10 MG/1
10 TABLET, FILM COATED ORAL NIGHTLY
Status: DISCONTINUED | OUTPATIENT
Start: 2022-06-15 | End: 2022-06-16

## 2022-06-15 RX ORDER — LATANOPROST 50 UG/ML
1 SOLUTION/ DROPS OPHTHALMIC NIGHTLY
Refills: 0 | Status: DISCONTINUED | OUTPATIENT
Start: 2022-06-15 | End: 2022-06-16

## 2022-06-15 RX ORDER — ASPIRIN 81 MG/1
81 TABLET, CHEWABLE ORAL DAILY
Status: DISCONTINUED | OUTPATIENT
Start: 2022-06-15 | End: 2022-06-16

## 2022-06-15 RX ORDER — MECLIZINE HYDROCHLORIDE 25 MG/1
25 TABLET ORAL 2 TIMES DAILY PRN
Status: DISCONTINUED | OUTPATIENT
Start: 2022-06-15 | End: 2022-06-16

## 2022-06-15 NOTE — PLAN OF CARE
Problem: Patient Centered Care  Goal: Patient preferences are identified and integrated in the patient's plan of care  Description: Interventions:  - What would you like us to know as we care for you?  Home with sons  - Provide timely, complete, and accurate information to patient/family  - Incorporate patient and family knowledge, values, beliefs, and cultural backgrounds into the planning and delivery of care  - Encourage patient/family to participate in care and decision-making at the level they choose  - Honor patient and family perspectives and choices  Outcome: Progressing     Problem: Diabetes/Glucose Control  Goal: Glucose maintained within prescribed range  Description: INTERVENTIONS:  - Monitor Blood Glucose as ordered  - Assess for signs and symptoms of hyperglycemia and hypoglycemia  - Administer ordered medications to maintain glucose within target range  - Assess barriers to adequate nutritional intake and initiate nutrition consult as needed  - Instruct patient on self management of diabetes  Outcome: Progressing     Problem: Patient/Family Goals  Goal: Patient/Family Long Term Goal  Description: Patient's Long Term Goal: to go home     Interventions:  - - monitor vital signs and labs  - monitor blood glucose levels  - pain management  - administer medication per order  - follow MD orders  - diagnostics per order  - fall precautions  - update / inform patient and family on plan of care  - discharge planning   - See additional Care Plan goals for specific interventions  Outcome: Progressing  Goal: Patient/Family Short Term Goal  Description: Patient's Short Term Goal: to feel better    Interventions:   - - monitor vital signs and labs  - monitor blood glucose levels  - pain management  - administer medication per order  - follow MD orders  - diagnostics per order  - fall precautions  - update / inform patient and family on plan of care  - discharge planning   - See additional Care Plan goals for specific interventions  Outcome: Progressing     Problem: PAIN - ADULT  Goal: Verbalizes/displays adequate comfort level or patient's stated pain goal  Description: INTERVENTIONS:  - Encourage pt to monitor pain and request assistance  - Assess pain using appropriate pain scale  - Administer analgesics based on type and severity of pain and evaluate response  - Implement non-pharmacological measures as appropriate and evaluate response  - Consider cultural and social influences on pain and pain management  - Manage/alleviate anxiety  - Utilize distraction and/or relaxation techniques  - Monitor for opioid side effects  - Notify MD/LIP if interventions unsuccessful or patient reports new pain  - Anticipate increased pain with activity and pre-medicate as appropriate  Outcome: Progressing     Problem: SAFETY ADULT - FALL  Goal: Free from fall injury  Description: INTERVENTIONS:  - Assess pt frequently for physical needs  - Identify cognitive and physical deficits and behaviors that affect risk of falls.   - [place] fall precautions as indicated by assessment.  - Educate pt/family on patient safety including physical limitations  - Instruct pt to call for assistance with activity based on assessment  - Modify environment to reduce risk of injury  - Provide assistive devices as appropriate  - Consider OT/PT consult to assist with strengthening/mobility  - Encourage toileting schedule  Outcome: Progressing     Problem: DISCHARGE PLANNING  Goal: Discharge to home or other facility with appropriate resources  Description: INTERVENTIONS:  - Identify barriers to discharge w/pt and caregiver  - Include patient/family/discharge partner in discharge planning  - Arrange for needed discharge resources and transportation as appropriate  - Identify discharge learning needs (meds, wound care, etc)  - Arrange for interpreters to assist at discharge as needed  - Consider post-discharge preferences of patient/family/discharge partner  - Complete POLST form as appropriate  - Assess patient's ability to be responsible for managing their own health  - Refer to Case Management Department for coordinating discharge planning if the patient needs post-hospital services based on physician/LIP order or complex needs related to functional status, cognitive ability or social support system  Outcome: Progressing     Patient is presently sitting up in chair. Alert x 4. Vital signs stable. Patient is medicated as needed for pain or discomfort. Patient receives intravenous antibiotics per doctor orders. Telemetry monitor in place. Tolerates diet well. Patient urine is concentrated, and encouraged to drink plenty of fluids. Call light within reach. Bed and chair alarm remains in use at all times.

## 2022-06-15 NOTE — PLAN OF CARE
Problem: Patient Centered Care  Goal: Patient preferences are identified and integrated in the patient's plan of care  Description: Interventions:  - What would you like us to know as we care for you?  Home with sons  - Provide timely, complete, and accurate information to patient/family  - Incorporate patient and family knowledge, values, beliefs, and cultural backgrounds into the planning and delivery of care  - Encourage patient/family to participate in care and decision-making at the level they choose  - Honor patient and family perspectives and choices  Outcome: Progressing     Problem: Diabetes/Glucose Control  Goal: Glucose maintained within prescribed range  Description: INTERVENTIONS:  - Monitor Blood Glucose as ordered  - Assess for signs and symptoms of hyperglycemia and hypoglycemia  - Administer ordered medications to maintain glucose within target range  - Assess barriers to adequate nutritional intake and initiate nutrition consult as needed  - Instruct patient on self management of diabetes  Outcome: Progressing     Problem: Patient/Family Goals  Goal: Patient/Family Long Term Goal  Description: Patient's Long Term Goal: to go home     Interventions:  - - monitor vital signs and labs  - monitor blood glucose levels  - pain management  - administer medication per order  - follow MD orders  - diagnostics per order  - fall precautions  - update / inform patient and family on plan of care  - discharge planning   - See additional Care Plan goals for specific interventions  Outcome: Progressing  Goal: Patient/Family Short Term Goal  Description: Patient's Short Term Goal: to feel better    Interventions:   - - monitor vital signs and labs  - monitor blood glucose levels  - pain management  - administer medication per order  - follow MD orders  - diagnostics per order  - fall precautions  - update / inform patient and family on plan of care  - discharge planning   - See additional Care Plan goals for specific interventions  Outcome: Progressing     Problem: SAFETY ADULT - FALL  Goal: Free from fall injury  Description: INTERVENTIONS:  - Assess pt frequently for physical needs  - Identify cognitive and physical deficits and behaviors that affect risk of falls.   - [place] fall precautions as indicated by assessment.  - Educate pt/family on patient safety including physical limitations  - Instruct pt to call for assistance with activity based on assessment  - Modify environment to reduce risk of injury  - Provide assistive devices as appropriate  - Consider OT/PT consult to assist with strengthening/mobility  - Encourage toileting schedule  Outcome: Progressing     Problem: DISCHARGE PLANNING  Goal: Discharge to home or other facility with appropriate resources  Description: INTERVENTIONS:  - Identify barriers to discharge w/pt and caregiver  - Include patient/family/discharge partner in discharge planning  - Arrange for needed discharge resources and transportation as appropriate  - Identify discharge learning needs (meds, wound care, etc)  - Arrange for interpreters to assist at discharge as needed  - Consider post-discharge preferences of patient/family/discharge partner  - Complete POLST form as appropriate  - Assess patient's ability to be responsible for managing their own health  - Refer to Case Management Department for coordinating discharge planning if the patient needs post-hospital services based on physician/LIP order or complex needs related to functional status, cognitive ability or social support system  Outcome: Progressing     Problem: PAIN - ADULT  Goal: Verbalizes/displays adequate comfort level or patient's stated pain goal  Description: INTERVENTIONS:  - Encourage pt to monitor pain and request assistance  - Assess pain using appropriate pain scale  - Administer analgesics based on type and severity of pain and evaluate response  - Implement non-pharmacological measures as appropriate and evaluate response  - Consider cultural and social influences on pain and pain management  - Manage/alleviate anxiety  - Utilize distraction and/or relaxation techniques  - Monitor for opioid side effects  - Notify MD/LIP if interventions unsuccessful or patient reports new pain  - Anticipate increased pain with activity and pre-medicate as appropriate  Outcome: Not Progressing     Patient resting in bed. Vital signs stable. As needed medication used for pain. Safety measures and fall precautions in place, call light with in reach, bed locked in lowest position, bed alarm on. Frequent rounding by nursing staff.

## 2022-06-16 VITALS
HEIGHT: 58 IN | TEMPERATURE: 99 F | DIASTOLIC BLOOD PRESSURE: 35 MMHG | WEIGHT: 166.69 LBS | RESPIRATION RATE: 18 BRPM | BODY MASS INDEX: 34.99 KG/M2 | OXYGEN SATURATION: 94 % | SYSTOLIC BLOOD PRESSURE: 126 MMHG | HEART RATE: 89 BPM

## 2022-06-16 LAB
ANION GAP SERPL CALC-SCNC: 8 MMOL/L (ref 0–18)
BASOPHILS # BLD AUTO: 0.06 X10(3) UL (ref 0–0.2)
BASOPHILS NFR BLD AUTO: 0.5 %
BUN BLD-MCNC: 18 MG/DL (ref 7–18)
BUN/CREAT SERPL: 16.8 (ref 10–20)
CALCIUM BLD-MCNC: 8.7 MG/DL (ref 8.5–10.1)
CHLORIDE SERPL-SCNC: 108 MMOL/L (ref 98–112)
CO2 SERPL-SCNC: 23 MMOL/L (ref 21–32)
CREAT BLD-MCNC: 1.07 MG/DL
DEPRECATED RDW RBC AUTO: 45.6 FL (ref 35.1–46.3)
EOSINOPHIL # BLD AUTO: 0.34 X10(3) UL (ref 0–0.7)
EOSINOPHIL NFR BLD AUTO: 3.1 %
ERYTHROCYTE [DISTWIDTH] IN BLOOD BY AUTOMATED COUNT: 14.2 % (ref 11–15)
GLUCOSE BLD-MCNC: 146 MG/DL (ref 70–99)
GLUCOSE BLDC GLUCOMTR-MCNC: 140 MG/DL (ref 70–99)
GLUCOSE BLDC GLUCOMTR-MCNC: 198 MG/DL (ref 70–99)
HCT VFR BLD AUTO: 34.7 %
HGB BLD-MCNC: 11 G/DL
IMM GRANULOCYTES # BLD AUTO: 0.07 X10(3) UL (ref 0–1)
IMM GRANULOCYTES NFR BLD: 0.6 %
LYMPHOCYTES # BLD AUTO: 3.05 X10(3) UL (ref 1–4)
LYMPHOCYTES NFR BLD AUTO: 27.9 %
MCH RBC QN AUTO: 28.1 PG (ref 26–34)
MCHC RBC AUTO-ENTMCNC: 31.7 G/DL (ref 31–37)
MCV RBC AUTO: 88.5 FL
MONOCYTES # BLD AUTO: 0.96 X10(3) UL (ref 0.1–1)
MONOCYTES NFR BLD AUTO: 8.8 %
NEUTROPHILS # BLD AUTO: 6.46 X10 (3) UL (ref 1.5–7.7)
NEUTROPHILS # BLD AUTO: 6.46 X10(3) UL (ref 1.5–7.7)
NEUTROPHILS NFR BLD AUTO: 59.1 %
OSMOLALITY SERPL CALC.SUM OF ELEC: 293 MOSM/KG (ref 275–295)
PLATELET # BLD AUTO: 292 10(3)UL (ref 150–450)
POTASSIUM SERPL-SCNC: 3.8 MMOL/L (ref 3.5–5.1)
RBC # BLD AUTO: 3.92 X10(6)UL
SODIUM SERPL-SCNC: 139 MMOL/L (ref 136–145)
WBC # BLD AUTO: 10.9 X10(3) UL (ref 4–11)

## 2022-06-16 PROCEDURE — 99239 HOSP IP/OBS DSCHRG MGMT >30: CPT | Performed by: HOSPITALIST

## 2022-06-16 RX ORDER — CIPROFLOXACIN 500 MG/1
500 TABLET, FILM COATED ORAL 2 TIMES DAILY
Qty: 14 TABLET | Refills: 0 | Status: SHIPPED | OUTPATIENT
Start: 2022-06-16 | End: 2022-06-23

## 2022-06-16 NOTE — PLAN OF CARE
Problem: Patient Centered Care  Goal: Patient preferences are identified and integrated in the patient's plan of care  Description: Interventions:  - What would you like us to know as we care for you?  Home with sons  - Provide timely, complete, and accurate information to patient/family  - Incorporate patient and family knowledge, values, beliefs, and cultural backgrounds into the planning and delivery of care  - Encourage patient/family to participate in care and decision-making at the level they choose  - Honor patient and family perspectives and choices  Outcome: Progressing     Problem: Diabetes/Glucose Control  Goal: Glucose maintained within prescribed range  Description: INTERVENTIONS:  - Monitor Blood Glucose as ordered  - Assess for signs and symptoms of hyperglycemia and hypoglycemia  - Administer ordered medications to maintain glucose within target range  - Assess barriers to adequate nutritional intake and initiate nutrition consult as needed  - Instruct patient on self management of diabetes  Outcome: Progressing     Problem: Patient/Family Goals  Goal: Patient/Family Long Term Goal  Description: Patient's Long Term Goal: to go home     Interventions:  - - monitor vital signs and labs  - monitor blood glucose levels  - pain management  - administer medication per order  - follow MD orders  - diagnostics per order  - fall precautions  - update / inform patient and family on plan of care  - discharge planning   - See additional Care Plan goals for specific interventions  Outcome: Progressing  Goal: Patient/Family Short Term Goal  Description: Patient's Short Term Goal: to feel better    Interventions:   - - monitor vital signs and labs  - monitor blood glucose levels  - pain management  - administer medication per order  - follow MD orders  - diagnostics per order  - fall precautions  - update / inform patient and family on plan of care  - discharge planning   - See additional Care Plan goals for specific interventions  Outcome: Progressing     Problem: PAIN - ADULT  Goal: Verbalizes/displays adequate comfort level or patient's stated pain goal  Description: INTERVENTIONS:  - Encourage pt to monitor pain and request assistance  - Assess pain using appropriate pain scale  - Administer analgesics based on type and severity of pain and evaluate response  - Implement non-pharmacological measures as appropriate and evaluate response  - Consider cultural and social influences on pain and pain management  - Manage/alleviate anxiety  - Utilize distraction and/or relaxation techniques  - Monitor for opioid side effects  - Notify MD/LIP if interventions unsuccessful or patient reports new pain  - Anticipate increased pain with activity and pre-medicate as appropriate  Outcome: Progressing     Problem: SAFETY ADULT - FALL  Goal: Free from fall injury  Description: INTERVENTIONS:  - Assess pt frequently for physical needs  - Identify cognitive and physical deficits and behaviors that affect risk of falls.   - [place] fall precautions as indicated by assessment.  - Educate pt/family on patient safety including physical limitations  - Instruct pt to call for assistance with activity based on assessment  - Modify environment to reduce risk of injury  - Provide assistive devices as appropriate  - Consider OT/PT consult to assist with strengthening/mobility  - Encourage toileting schedule  Outcome: Progressing     Problem: DISCHARGE PLANNING  Goal: Discharge to home or other facility with appropriate resources  Description: INTERVENTIONS:  - Identify barriers to discharge w/pt and caregiver  - Include patient/family/discharge partner in discharge planning  - Arrange for needed discharge resources and transportation as appropriate  - Identify discharge learning needs (meds, wound care, etc)  - Arrange for interpreters to assist at discharge as needed  - Consider post-discharge preferences of patient/family/discharge partner  - Complete POLST form as appropriate  - Assess patient's ability to be responsible for managing their own health  - Refer to Case Management Department for coordinating discharge planning if the patient needs post-hospital services based on physician/LIP order or complex needs related to functional status, cognitive ability or social support system  Outcome: Progressing   Patient is resting in bed comfortably, denies pain, vital signs stable, safety, fall precautions in place, IVF & IV antibiotics going. All needs met at this time.

## 2022-06-16 NOTE — PLAN OF CARE
Pt. Alert, oriented, vitals stable. C/o mild back pain, prn medications given with effectiveness. No s/s of hypo/hyperglycemia noted. Orders for patient to discharge home with oral antibiotics. Pt. Son to transport home. Avs discussed with patient, no questions/concerns. Problem: Patient Centered Care  Goal: Patient preferences are identified and integrated in the patient's plan of care  Description: Interventions:  - What would you like us to know as we care for you?  Home with sons  - Provide timely, complete, and accurate information to patient/family  - Incorporate patient and family knowledge, values, beliefs, and cultural backgrounds into the planning and delivery of care  - Encourage patient/family to participate in care and decision-making at the level they choose  - Honor patient and family perspectives and choices  Outcome: Adequate for Discharge     Problem: Diabetes/Glucose Control  Goal: Glucose maintained within prescribed range  Description: INTERVENTIONS:  - Monitor Blood Glucose as ordered  - Assess for signs and symptoms of hyperglycemia and hypoglycemia  - Administer ordered medications to maintain glucose within target range  - Assess barriers to adequate nutritional intake and initiate nutrition consult as needed  - Instruct patient on self management of diabetes  Outcome: Adequate for Discharge     Problem: Patient/Family Goals  Goal: Patient/Family Long Term Goal  Description: Patient's Long Term Goal: to go home     Interventions:  - - monitor vital signs and labs  - monitor blood glucose levels  - pain management  - administer medication per order  - follow MD orders  - diagnostics per order  - fall precautions  - update / inform patient and family on plan of care  - discharge planning   - See additional Care Plan goals for specific interventions  Outcome: Adequate for Discharge  Goal: Patient/Family Short Term Goal  Description: Patient's Short Term Goal: to feel better    Interventions: - - monitor vital signs and labs  - monitor blood glucose levels  - pain management  - administer medication per order  - follow MD orders  - diagnostics per order  - fall precautions  - update / inform patient and family on plan of care  - discharge planning   - See additional Care Plan goals for specific interventions  Outcome: Adequate for Discharge     Problem: PAIN - ADULT  Goal: Verbalizes/displays adequate comfort level or patient's stated pain goal  Description: INTERVENTIONS:  - Encourage pt to monitor pain and request assistance  - Assess pain using appropriate pain scale  - Administer analgesics based on type and severity of pain and evaluate response  - Implement non-pharmacological measures as appropriate and evaluate response  - Consider cultural and social influences on pain and pain management  - Manage/alleviate anxiety  - Utilize distraction and/or relaxation techniques  - Monitor for opioid side effects  - Notify MD/LIP if interventions unsuccessful or patient reports new pain  - Anticipate increased pain with activity and pre-medicate as appropriate  Outcome: Adequate for Discharge    Problem: SAFETY ADULT - FALL  Goal: Free from fall injury  Description: INTERVENTIONS:  - Assess pt frequently for physical needs  - Identify cognitive and physical deficits and behaviors that affect risk of falls.   - [place] fall precautions as indicated by assessment.  - Educate pt/family on patient safety including physical limitations  - Instruct pt to call for assistance with activity based on assessment  - Modify environment to reduce risk of injury  - Provide assistive devices as appropriate  - Consider OT/PT consult to assist with strengthening/mobility  - Encourage toileting schedule  Outcome: Adequate for Discharge     Problem: DISCHARGE PLANNING  Goal: Discharge to home or other facility with appropriate resources  Description: INTERVENTIONS:  - Identify barriers to discharge w/pt and caregiver  - Include patient/family/discharge partner in discharge planning  - Arrange for needed discharge resources and transportation as appropriate  - Identify discharge learning needs (meds, wound care, etc)  - Arrange for interpreters to assist at discharge as needed  - Consider post-discharge preferences of patient/family/discharge partner  - Complete POLST form as appropriate  - Assess patient's ability to be responsible for managing their own health  - Refer to Case Management Department for coordinating discharge planning if the patient needs post-hospital services based on physician/LIP order or complex needs related to functional status, cognitive ability or social support system  Outcome: Adequate for Discharge

## 2022-06-17 ENCOUNTER — PATIENT OUTREACH (OUTPATIENT)
Dept: CASE MANAGEMENT | Age: 77
End: 2022-06-17

## 2022-06-17 DIAGNOSIS — Z02.9 ENCOUNTERS FOR UNSPECIFIED ADMINISTRATIVE PURPOSE: ICD-10-CM

## 2022-06-17 PROCEDURE — 1111F DSCHRG MED/CURRENT MED MERGE: CPT

## 2022-06-17 NOTE — PROGRESS NOTES
Attempted to reach the patient to complete a [facility] FU call. Left a message for the pt to call the NCM back at, [phone].

## 2022-06-23 ENCOUNTER — OFFICE VISIT (OUTPATIENT)
Dept: INTERNAL MEDICINE CLINIC | Facility: CLINIC | Age: 77
End: 2022-06-23
Payer: COMMERCIAL

## 2022-06-23 VITALS
RESPIRATION RATE: 17 BRPM | BODY MASS INDEX: 34 KG/M2 | OXYGEN SATURATION: 97 % | SYSTOLIC BLOOD PRESSURE: 118 MMHG | HEART RATE: 99 BPM | WEIGHT: 162 LBS | HEIGHT: 58 IN | DIASTOLIC BLOOD PRESSURE: 60 MMHG

## 2022-06-23 DIAGNOSIS — I10 ESSENTIAL HYPERTENSION: ICD-10-CM

## 2022-06-23 DIAGNOSIS — D64.9 ANEMIA, UNSPECIFIED TYPE: ICD-10-CM

## 2022-06-23 DIAGNOSIS — R00.2 PALPITATIONS: ICD-10-CM

## 2022-06-23 DIAGNOSIS — E11.9 TYPE 2 DIABETES MELLITUS WITHOUT COMPLICATION, WITHOUT LONG-TERM CURRENT USE OF INSULIN (HCC): ICD-10-CM

## 2022-06-23 DIAGNOSIS — Z09 HOSPITAL DISCHARGE FOLLOW-UP: Primary | ICD-10-CM

## 2022-06-23 PROCEDURE — 3008F BODY MASS INDEX DOCD: CPT | Performed by: FAMILY MEDICINE

## 2022-06-23 PROCEDURE — 3074F SYST BP LT 130 MM HG: CPT | Performed by: FAMILY MEDICINE

## 2022-06-23 PROCEDURE — 99495 TRANSJ CARE MGMT MOD F2F 14D: CPT | Performed by: FAMILY MEDICINE

## 2022-06-23 PROCEDURE — 3060F POS MICROALBUMINURIA REV: CPT | Performed by: FAMILY MEDICINE

## 2022-06-23 PROCEDURE — 3078F DIAST BP <80 MM HG: CPT | Performed by: FAMILY MEDICINE

## 2022-06-23 PROCEDURE — 1111F DSCHRG MED/CURRENT MED MERGE: CPT | Performed by: FAMILY MEDICINE

## 2022-06-24 ENCOUNTER — TELEPHONE (OUTPATIENT)
Dept: INTERNAL MEDICINE CLINIC | Facility: CLINIC | Age: 77
End: 2022-06-24

## 2022-06-24 DIAGNOSIS — D75.839 THROMBOCYTOSIS: ICD-10-CM

## 2022-06-24 DIAGNOSIS — E83.52 SERUM CALCIUM ELEVATED: Primary | ICD-10-CM

## 2022-06-24 LAB
% SATURATION: 10 % (CALC) (ref 16–45)
BUN/CREATININE RATIO: 18 (CALC) (ref 6–22)
BUN: 24 MG/DL (ref 7–25)
CALCIUM: 12.5 MG/DL (ref 8.6–10.4)
CARBON DIOXIDE: 32 MMOL/L (ref 20–32)
CHLORIDE: 99 MMOL/L (ref 98–110)
CREATININE, RANDOM URINE: 63 MG/DL (ref 20–275)
CREATININE: 1.36 MG/DL (ref 0.6–0.93)
EGFR IF AFRICN AM: 44 ML/MIN/1.73M2
EGFR IF NONAFRICN AM: 38 ML/MIN/1.73M2
FERRITIN: 26 NG/ML (ref 16–288)
GLUCOSE: 150 MG/DL (ref 65–99)
HEMATOCRIT: 38.6 % (ref 35–45)
HEMOGLOBIN: 12.2 G/DL (ref 11.7–15.5)
IRON BINDING CAPACITY: 460 MCG/DL (CALC) (ref 250–450)
IRON, TOTAL: 47 MCG/DL (ref 45–160)
MAGNESIUM: 1.5 MG/DL (ref 1.5–2.5)
MCH: 27.7 PG (ref 27–33)
MCHC: 31.6 G/DL (ref 32–36)
MCV: 87.7 FL (ref 80–100)
MICROALBUMIN/CREATININE RATIO, RANDOM URINE: 40 MCG/MG CREAT
MICROALBUMIN: 2.5 MG/DL
MPV: 9 FL (ref 7.5–12.5)
PLATELET COUNT: 501 THOUSAND/UL (ref 140–400)
POTASSIUM: 5.1 MMOL/L (ref 3.5–5.3)
RDW: 13 % (ref 11–15)
RED BLOOD CELL COUNT: 4.4 MILLION/UL (ref 3.8–5.1)
SODIUM: 139 MMOL/L (ref 135–146)
TSH W/REFLEX TO FT4: 1.82 MIU/L (ref 0.4–4.5)
WHITE BLOOD CELL COUNT: 7 THOUSAND/UL (ref 3.8–10.8)

## 2022-06-24 NOTE — TELEPHONE ENCOUNTER
Called and discussed with patient. Not taking any supplements. Takes omeprazole at times. No other antacids. Advised importance of good hydration. Stopped lisinopril-hydrochlorothiazide one week ago. Will get repeat bmp and cbc tomorrow.    Sinan Casillas MD

## 2022-06-25 ENCOUNTER — NURSE ONLY (OUTPATIENT)
Dept: INTERNAL MEDICINE CLINIC | Facility: CLINIC | Age: 77
End: 2022-06-25
Payer: COMMERCIAL

## 2022-06-25 PROCEDURE — 1111F DSCHRG MED/CURRENT MED MERGE: CPT | Performed by: FAMILY MEDICINE

## 2022-06-26 LAB
ABSOLUTE BASOPHILS: 68 CELLS/UL (ref 0–200)
ABSOLUTE EOSINOPHILS: 238 CELLS/UL (ref 15–500)
ABSOLUTE LYMPHOCYTES: 2475 CELLS/UL (ref 850–3900)
ABSOLUTE MONOCYTES: 510 CELLS/UL (ref 200–950)
ABSOLUTE NEUTROPHILS: 3509 CELLS/UL (ref 1500–7800)
BASOPHILS: 1 %
BUN/CREATININE RATIO: 20 (CALC) (ref 6–22)
BUN: 27 MG/DL (ref 7–25)
CALCIUM: 11.1 MG/DL (ref 8.6–10.4)
CARBON DIOXIDE: 29 MMOL/L (ref 20–32)
CHLORIDE: 98 MMOL/L (ref 98–110)
CREATININE: 1.37 MG/DL (ref 0.6–0.93)
EGFR IF AFRICN AM: 44 ML/MIN/1.73M2
EGFR IF NONAFRICN AM: 38 ML/MIN/1.73M2
EOSINOPHILS: 3.5 %
GLUCOSE: 131 MG/DL (ref 65–99)
HEMATOCRIT: 38.4 % (ref 35–45)
HEMOGLOBIN: 12.3 G/DL (ref 11.7–15.5)
LYMPHOCYTES: 36.4 %
MCH: 28 PG (ref 27–33)
MCHC: 32 G/DL (ref 32–36)
MCV: 87.3 FL (ref 80–100)
MONOCYTES: 7.5 %
MPV: 9.6 FL (ref 7.5–12.5)
NEUTROPHILS: 51.6 %
PLATELET COUNT: 484 THOUSAND/UL (ref 140–400)
POTASSIUM: 4.6 MMOL/L (ref 3.5–5.3)
RDW: 13 % (ref 11–15)
RED BLOOD CELL COUNT: 4.4 MILLION/UL (ref 3.8–5.1)
SODIUM: 137 MMOL/L (ref 135–146)
WHITE BLOOD CELL COUNT: 6.8 THOUSAND/UL (ref 3.8–10.8)

## 2022-08-13 ENCOUNTER — HOSPITAL ENCOUNTER (INPATIENT)
Facility: HOSPITAL | Age: 77
LOS: 4 days | Discharge: HOME | End: 2022-08-17
Attending: EMERGENCY MEDICINE | Admitting: HOSPITALIST
Payer: MEDICARE

## 2022-08-13 ENCOUNTER — HOSPITAL ENCOUNTER (OUTPATIENT)
Age: 77
Discharge: ANOTHER HOSPITAL | End: 2022-08-13
Payer: MEDICARE

## 2022-08-13 ENCOUNTER — APPOINTMENT (OUTPATIENT)
Dept: CT IMAGING | Facility: HOSPITAL | Age: 77
DRG: 660 | End: 2022-08-13
Attending: EMERGENCY MEDICINE
Payer: MEDICARE

## 2022-08-13 ENCOUNTER — HOSPITAL ENCOUNTER (INPATIENT)
Facility: HOSPITAL | Age: 77
LOS: 4 days | Discharge: HOME | DRG: 660 | End: 2022-08-17
Attending: EMERGENCY MEDICINE | Admitting: HOSPITALIST
Payer: MEDICARE

## 2022-08-13 ENCOUNTER — APPOINTMENT (OUTPATIENT)
Dept: CT IMAGING | Facility: HOSPITAL | Age: 77
End: 2022-08-13
Attending: EMERGENCY MEDICINE
Payer: MEDICARE

## 2022-08-13 ENCOUNTER — HOSPITAL ENCOUNTER (OUTPATIENT)
Age: 77
Discharge: ANOTHER HOSPITAL | DRG: 660 | End: 2022-08-13
Payer: MEDICARE

## 2022-08-13 VITALS
TEMPERATURE: 98 F | DIASTOLIC BLOOD PRESSURE: 67 MMHG | RESPIRATION RATE: 20 BRPM | SYSTOLIC BLOOD PRESSURE: 137 MMHG | HEART RATE: 116 BPM | OXYGEN SATURATION: 98 %

## 2022-08-13 DIAGNOSIS — R10.9 ABDOMINAL PAIN, ACUTE: ICD-10-CM

## 2022-08-13 DIAGNOSIS — R10.9 FLANK PAIN: Primary | ICD-10-CM

## 2022-08-13 DIAGNOSIS — N12 PYELONEPHRITIS: Primary | ICD-10-CM

## 2022-08-13 LAB
ALBUMIN SERPL-MCNC: 3.6 G/DL (ref 3.4–5)
ALP LIVER SERPL-CCNC: 187 U/L
ALT SERPL-CCNC: 42 U/L
ANION GAP SERPL CALC-SCNC: 6 MMOL/L (ref 0–18)
AST SERPL-CCNC: 62 U/L (ref 15–37)
BASOPHILS # BLD AUTO: 0.04 X10(3) UL (ref 0–0.2)
BASOPHILS NFR BLD AUTO: 0.3 %
BILIRUB DIRECT SERPL-MCNC: 0.2 MG/DL (ref 0–0.2)
BILIRUB SERPL-MCNC: 0.6 MG/DL (ref 0.1–2)
BILIRUB UR QL: NEGATIVE
BUN BLD-MCNC: 22 MG/DL (ref 7–18)
BUN/CREAT SERPL: 14 (ref 10–20)
CALCIUM BLD-MCNC: 9.7 MG/DL (ref 8.5–10.1)
CHLORIDE SERPL-SCNC: 104 MMOL/L (ref 98–112)
CO2 SERPL-SCNC: 30 MMOL/L (ref 21–32)
COLOR UR: YELLOW
CREAT BLD-MCNC: 1.57 MG/DL
DEPRECATED RDW RBC AUTO: 44.7 FL (ref 35.1–46.3)
EOSINOPHIL # BLD AUTO: 0 X10(3) UL (ref 0–0.7)
EOSINOPHIL NFR BLD AUTO: 0 %
ERYTHROCYTE [DISTWIDTH] IN BLOOD BY AUTOMATED COUNT: 14 % (ref 11–15)
GFR SERPLBLD BASED ON 1.73 SQ M-ARVRAT: 34 ML/MIN/1.73M2 (ref 60–?)
GLUCOSE BLD-MCNC: 186 MG/DL (ref 70–99)
GLUCOSE BLDC GLUCOMTR-MCNC: 166 MG/DL (ref 70–99)
GLUCOSE BLDC GLUCOMTR-MCNC: 202 MG/DL (ref 70–99)
GLUCOSE UR-MCNC: NEGATIVE MG/DL
HCT VFR BLD AUTO: 40.6 %
HGB BLD-MCNC: 12.3 G/DL
IMM GRANULOCYTES # BLD AUTO: 0.04 X10(3) UL (ref 0–1)
IMM GRANULOCYTES NFR BLD: 0.3 %
LYMPHOCYTES # BLD AUTO: 1.17 X10(3) UL (ref 1–4)
LYMPHOCYTES NFR BLD AUTO: 10 %
MCH RBC QN AUTO: 26.5 PG (ref 26–34)
MCHC RBC AUTO-ENTMCNC: 30.3 G/DL (ref 31–37)
MCV RBC AUTO: 87.5 FL
MONOCYTES # BLD AUTO: 0.83 X10(3) UL (ref 0.1–1)
MONOCYTES NFR BLD AUTO: 7.1 %
NEUTROPHILS # BLD AUTO: 9.67 X10 (3) UL (ref 1.5–7.7)
NEUTROPHILS # BLD AUTO: 9.67 X10(3) UL (ref 1.5–7.7)
NEUTROPHILS NFR BLD AUTO: 82.3 %
NITRITE UR QL STRIP.AUTO: NEGATIVE
OSMOLALITY SERPL CALC.SUM OF ELEC: 298 MOSM/KG (ref 275–295)
PH UR: 8 [PH] (ref 5–8)
PLATELET # BLD AUTO: 407 10(3)UL (ref 150–450)
POTASSIUM SERPL-SCNC: 3.8 MMOL/L (ref 3.5–5.1)
PROT SERPL-MCNC: 8.4 G/DL (ref 6.4–8.2)
PROT UR-MCNC: 30 MG/DL
RBC # BLD AUTO: 4.64 X10(6)UL
RBC #/AREA URNS AUTO: >10 /HPF
SARS-COV-2 RNA RESP QL NAA+PROBE: NOT DETECTED
SODIUM SERPL-SCNC: 140 MMOL/L (ref 136–145)
SP GR UR STRIP: 1.02 (ref 1–1.03)
UROBILINOGEN UR STRIP-ACNC: <2
VIT C UR-MCNC: NEGATIVE MG/DL
WBC # BLD AUTO: 11.8 X10(3) UL (ref 4–11)

## 2022-08-13 PROCEDURE — 87086 URINE CULTURE/COLONY COUNT: CPT | Performed by: EMERGENCY MEDICINE

## 2022-08-13 PROCEDURE — 96361 HYDRATE IV INFUSION ADD-ON: CPT

## 2022-08-13 PROCEDURE — 74176 CT ABD & PELVIS W/O CONTRAST: CPT | Performed by: EMERGENCY MEDICINE

## 2022-08-13 PROCEDURE — 96376 TX/PRO/DX INJ SAME DRUG ADON: CPT

## 2022-08-13 PROCEDURE — 96375 TX/PRO/DX INJ NEW DRUG ADDON: CPT

## 2022-08-13 PROCEDURE — 99285 EMERGENCY DEPT VISIT HI MDM: CPT

## 2022-08-13 PROCEDURE — 82962 GLUCOSE BLOOD TEST: CPT

## 2022-08-13 PROCEDURE — 96365 THER/PROPH/DIAG IV INF INIT: CPT

## 2022-08-13 PROCEDURE — 80048 BASIC METABOLIC PNL TOTAL CA: CPT | Performed by: EMERGENCY MEDICINE

## 2022-08-13 PROCEDURE — 81001 URINALYSIS AUTO W/SCOPE: CPT | Performed by: EMERGENCY MEDICINE

## 2022-08-13 PROCEDURE — 80076 HEPATIC FUNCTION PANEL: CPT | Performed by: EMERGENCY MEDICINE

## 2022-08-13 PROCEDURE — 85025 COMPLETE CBC W/AUTO DIFF WBC: CPT | Performed by: EMERGENCY MEDICINE

## 2022-08-13 PROCEDURE — 87040 BLOOD CULTURE FOR BACTERIA: CPT | Performed by: INTERNAL MEDICINE

## 2022-08-13 PROCEDURE — 36415 COLL VENOUS BLD VENIPUNCTURE: CPT

## 2022-08-13 PROCEDURE — 99205 OFFICE O/P NEW HI 60 MIN: CPT | Performed by: NURSE PRACTITIONER

## 2022-08-13 RX ORDER — HEPARIN SODIUM 5000 [USP'U]/ML
5000 INJECTION, SOLUTION INTRAVENOUS; SUBCUTANEOUS EVERY 8 HOURS SCHEDULED
Status: DISCONTINUED | OUTPATIENT
Start: 2022-08-13 | End: 2022-08-17

## 2022-08-13 RX ORDER — MORPHINE SULFATE 4 MG/ML
4 INJECTION, SOLUTION INTRAMUSCULAR; INTRAVENOUS ONCE
Status: COMPLETED | OUTPATIENT
Start: 2022-08-13 | End: 2022-08-13

## 2022-08-13 RX ORDER — SODIUM CHLORIDE 9 MG/ML
INJECTION, SOLUTION INTRAVENOUS CONTINUOUS
Status: DISCONTINUED | OUTPATIENT
Start: 2022-08-13 | End: 2022-08-17

## 2022-08-13 RX ORDER — NICOTINE POLACRILEX 4 MG
30 LOZENGE BUCCAL
Status: DISCONTINUED | OUTPATIENT
Start: 2022-08-13 | End: 2022-08-17

## 2022-08-13 RX ORDER — NICOTINE POLACRILEX 4 MG
15 LOZENGE BUCCAL
Status: DISCONTINUED | OUTPATIENT
Start: 2022-08-13 | End: 2022-08-17

## 2022-08-13 RX ORDER — DEXTROSE MONOHYDRATE 25 G/50ML
50 INJECTION, SOLUTION INTRAVENOUS
Status: DISCONTINUED | OUTPATIENT
Start: 2022-08-13 | End: 2022-08-17

## 2022-08-13 RX ORDER — HYDROCODONE BITARTRATE AND ACETAMINOPHEN 5; 325 MG/1; MG/1
2 TABLET ORAL EVERY 4 HOURS PRN
Status: DISCONTINUED | OUTPATIENT
Start: 2022-08-13 | End: 2022-08-17

## 2022-08-13 RX ORDER — HYDROCODONE BITARTRATE AND ACETAMINOPHEN 5; 325 MG/1; MG/1
1 TABLET ORAL EVERY 4 HOURS PRN
Status: DISCONTINUED | OUTPATIENT
Start: 2022-08-13 | End: 2022-08-17

## 2022-08-13 RX ORDER — ATORVASTATIN CALCIUM 10 MG/1
10 TABLET, FILM COATED ORAL NIGHTLY
Status: DISCONTINUED | OUTPATIENT
Start: 2022-08-13 | End: 2022-08-17

## 2022-08-13 RX ORDER — ONDANSETRON 2 MG/ML
4 INJECTION INTRAMUSCULAR; INTRAVENOUS EVERY 6 HOURS PRN
Status: DISCONTINUED | OUTPATIENT
Start: 2022-08-13 | End: 2022-08-17

## 2022-08-13 RX ORDER — ACETAMINOPHEN 325 MG/1
650 TABLET ORAL EVERY 4 HOURS PRN
Status: DISCONTINUED | OUTPATIENT
Start: 2022-08-13 | End: 2022-08-17

## 2022-08-13 RX ORDER — LATANOPROST 50 UG/ML
1 SOLUTION/ DROPS OPHTHALMIC NIGHTLY
Status: DISCONTINUED | OUTPATIENT
Start: 2022-08-13 | End: 2022-08-17

## 2022-08-13 RX ORDER — ACETAMINOPHEN 500 MG
500 TABLET ORAL EVERY 4 HOURS PRN
Status: DISCONTINUED | OUTPATIENT
Start: 2022-08-13 | End: 2022-08-17

## 2022-08-13 RX ORDER — ASPIRIN 81 MG/1
81 TABLET, CHEWABLE ORAL DAILY
Status: DISCONTINUED | OUTPATIENT
Start: 2022-08-13 | End: 2022-08-13

## 2022-08-13 NOTE — ED INITIAL ASSESSMENT (HPI)
Patient complains of R flank pain and abd pain since yesterday, states she has vaginal irritation as well

## 2022-08-13 NOTE — ED QUICK NOTES
Orders for admission, patient is aware of plan and ready to go upstairs. Any questions, please call ED RN Vianey at extension [phone].      Patient Covid vaccination status: Fully vaccinated     COVID Test Ordered in ED: Rapid SARS-CoV-2 by PCR    COVID Suspicion at Admission: Low clinical suspicion for COVID    Running Infusions:      Mental Status/LOC at time of transport: aaox4    Other pertinent information:   CIWA score: N/A   NIH score:  N/A   Covid pending

## 2022-08-13 NOTE — ED INITIAL ASSESSMENT (HPI)
Patient presents a&o 4/4 with c/o R side flank and lower back pain that radiates to groin.  States noticed pain and hematuria x 1 day

## 2022-08-14 ENCOUNTER — APPOINTMENT (OUTPATIENT)
Dept: CT IMAGING | Facility: HOSPITAL | Age: 77
DRG: 660 | End: 2022-08-14
Attending: UROLOGY
Payer: MEDICARE

## 2022-08-14 ENCOUNTER — APPOINTMENT (OUTPATIENT)
Dept: CT IMAGING | Facility: HOSPITAL | Age: 77
End: 2022-08-14
Attending: UROLOGY
Payer: MEDICARE

## 2022-08-14 LAB
ALBUMIN SERPL-MCNC: 2.8 G/DL (ref 3.4–5)
ALBUMIN/GLOB SERPL: 0.8 {RATIO} (ref 1–2)
ALP LIVER SERPL-CCNC: 120 U/L
ALT SERPL-CCNC: 27 U/L
ANION GAP SERPL CALC-SCNC: 6 MMOL/L (ref 0–18)
AST SERPL-CCNC: 38 U/L (ref 15–37)
BASOPHILS # BLD AUTO: 0.04 X10(3) UL (ref 0–0.2)
BASOPHILS NFR BLD AUTO: 0.4 %
BILIRUB SERPL-MCNC: 0.4 MG/DL (ref 0.1–2)
BUN BLD-MCNC: 20 MG/DL (ref 7–18)
BUN/CREAT SERPL: 15.6 (ref 10–20)
CALCIUM BLD-MCNC: 8.1 MG/DL (ref 8.5–10.1)
CHLORIDE SERPL-SCNC: 109 MMOL/L (ref 98–112)
CHOLEST SERPL-MCNC: 104 MG/DL (ref ?–200)
CO2 SERPL-SCNC: 26 MMOL/L (ref 21–32)
CREAT BLD-MCNC: 1.28 MG/DL
DEPRECATED RDW RBC AUTO: 45.7 FL (ref 35.1–46.3)
EOSINOPHIL # BLD AUTO: 0.15 X10(3) UL (ref 0–0.7)
EOSINOPHIL NFR BLD AUTO: 1.6 %
ERYTHROCYTE [DISTWIDTH] IN BLOOD BY AUTOMATED COUNT: 14.2 % (ref 11–15)
EST. AVERAGE GLUCOSE BLD GHB EST-MCNC: 151 MG/DL (ref 68–126)
GFR SERPLBLD BASED ON 1.73 SQ M-ARVRAT: 44 ML/MIN/1.73M2 (ref 60–?)
GLOBULIN PLAS-MCNC: 3.7 G/DL (ref 2.8–4.4)
GLUCOSE BLD-MCNC: 135 MG/DL (ref 70–99)
GLUCOSE BLDC GLUCOMTR-MCNC: 138 MG/DL (ref 70–99)
GLUCOSE BLDC GLUCOMTR-MCNC: 144 MG/DL (ref 70–99)
GLUCOSE BLDC GLUCOMTR-MCNC: 151 MG/DL (ref 70–99)
GLUCOSE BLDC GLUCOMTR-MCNC: 170 MG/DL (ref 70–99)
HBA1C MFR BLD: 6.9 % (ref ?–5.7)
HCT VFR BLD AUTO: 32.4 %
HDLC SERPL-MCNC: 46 MG/DL (ref 40–59)
HGB BLD-MCNC: 9.7 G/DL
IMM GRANULOCYTES # BLD AUTO: 0.02 X10(3) UL (ref 0–1)
IMM GRANULOCYTES NFR BLD: 0.2 %
LDLC SERPL CALC-MCNC: 33 MG/DL (ref ?–100)
LYMPHOCYTES # BLD AUTO: 2.52 X10(3) UL (ref 1–4)
LYMPHOCYTES NFR BLD AUTO: 27.6 %
MAGNESIUM SERPL-MCNC: 2.2 MG/DL (ref 1.6–2.6)
MCH RBC QN AUTO: 26.4 PG (ref 26–34)
MCHC RBC AUTO-ENTMCNC: 29.9 G/DL (ref 31–37)
MCV RBC AUTO: 88.3 FL
MONOCYTES # BLD AUTO: 0.8 X10(3) UL (ref 0.1–1)
MONOCYTES NFR BLD AUTO: 8.8 %
NEUTROPHILS # BLD AUTO: 5.6 X10 (3) UL (ref 1.5–7.7)
NEUTROPHILS # BLD AUTO: 5.6 X10(3) UL (ref 1.5–7.7)
NEUTROPHILS NFR BLD AUTO: 61.4 %
NONHDLC SERPL-MCNC: 58 MG/DL (ref ?–130)
OSMOLALITY SERPL CALC.SUM OF ELEC: 297 MOSM/KG (ref 275–295)
PLATELET # BLD AUTO: 314 10(3)UL (ref 150–450)
POTASSIUM SERPL-SCNC: 3.7 MMOL/L (ref 3.5–5.1)
PROT SERPL-MCNC: 6.5 G/DL (ref 6.4–8.2)
RBC # BLD AUTO: 3.67 X10(6)UL
SODIUM SERPL-SCNC: 141 MMOL/L (ref 136–145)
TRIGL SERPL-MCNC: 149 MG/DL (ref 30–149)
VLDLC SERPL CALC-MCNC: 20 MG/DL (ref 0–30)
WBC # BLD AUTO: 9.1 X10(3) UL (ref 4–11)

## 2022-08-14 PROCEDURE — 97116 GAIT TRAINING THERAPY: CPT

## 2022-08-14 PROCEDURE — 80061 LIPID PANEL: CPT | Performed by: INTERNAL MEDICINE

## 2022-08-14 PROCEDURE — 82962 GLUCOSE BLOOD TEST: CPT

## 2022-08-14 PROCEDURE — 83036 HEMOGLOBIN GLYCOSYLATED A1C: CPT | Performed by: HOSPITALIST

## 2022-08-14 PROCEDURE — 74178 CT ABD&PLV WO CNTR FLWD CNTR: CPT | Performed by: UROLOGY

## 2022-08-14 PROCEDURE — 85025 COMPLETE CBC W/AUTO DIFF WBC: CPT | Performed by: INTERNAL MEDICINE

## 2022-08-14 PROCEDURE — 80053 COMPREHEN METABOLIC PANEL: CPT | Performed by: INTERNAL MEDICINE

## 2022-08-14 PROCEDURE — 97162 PT EVAL MOD COMPLEX 30 MIN: CPT

## 2022-08-14 PROCEDURE — 3044F HG A1C LEVEL LT 7.0%: CPT | Performed by: FAMILY MEDICINE

## 2022-08-14 PROCEDURE — 83735 ASSAY OF MAGNESIUM: CPT | Performed by: INTERNAL MEDICINE

## 2022-08-14 PROCEDURE — 76377 3D RENDER W/INTRP POSTPROCES: CPT | Performed by: UROLOGY

## 2022-08-14 NOTE — CM/SW NOTE
Received MDO for Advanced Directives. Per chart review, note from February 2022 states pt has POA HC completed but not uploaded into Epic. SW spoke pt via her room phone. Pt confirmed she has a completed [address] at home. SW confirmed, pt's POA HC is her son Martha Vigil. PT recommendation for Home. No needs anticipated for DC.       Traci Catalan, MSW, [facility]

## 2022-08-15 LAB
ANION GAP SERPL CALC-SCNC: 6 MMOL/L (ref 0–18)
BUN BLD-MCNC: 16 MG/DL (ref 7–18)
BUN/CREAT SERPL: 11.8 (ref 10–20)
CALCIUM BLD-MCNC: 7.5 MG/DL (ref 8.5–10.1)
CHLORIDE SERPL-SCNC: 110 MMOL/L (ref 98–112)
CO2 SERPL-SCNC: 23 MMOL/L (ref 21–32)
CREAT BLD-MCNC: 1.36 MG/DL
DEPRECATED RDW RBC AUTO: 47 FL (ref 35.1–46.3)
ERYTHROCYTE [DISTWIDTH] IN BLOOD BY AUTOMATED COUNT: 14.3 % (ref 11–15)
GFR SERPLBLD BASED ON 1.73 SQ M-ARVRAT: 41 ML/MIN/1.73M2 (ref 60–?)
GLUCOSE BLD-MCNC: 146 MG/DL (ref 70–99)
GLUCOSE BLDC GLUCOMTR-MCNC: 134 MG/DL (ref 70–99)
GLUCOSE BLDC GLUCOMTR-MCNC: 139 MG/DL (ref 70–99)
GLUCOSE BLDC GLUCOMTR-MCNC: 163 MG/DL (ref 70–99)
GLUCOSE BLDC GLUCOMTR-MCNC: 181 MG/DL (ref 70–99)
HCT VFR BLD AUTO: 31.5 %
HGB BLD-MCNC: 9.2 G/DL
MCH RBC QN AUTO: 26.4 PG (ref 26–34)
MCHC RBC AUTO-ENTMCNC: 29.2 G/DL (ref 31–37)
MCV RBC AUTO: 90.3 FL
OSMOLALITY SERPL CALC.SUM OF ELEC: 292 MOSM/KG (ref 275–295)
PLATELET # BLD AUTO: 291 10(3)UL (ref 150–450)
POTASSIUM SERPL-SCNC: 3.7 MMOL/L (ref 3.5–5.1)
RBC # BLD AUTO: 3.49 X10(6)UL
SODIUM SERPL-SCNC: 139 MMOL/L (ref 136–145)
WBC # BLD AUTO: 8.7 X10(3) UL (ref 4–11)

## 2022-08-15 PROCEDURE — 85027 COMPLETE CBC AUTOMATED: CPT | Performed by: HOSPITALIST

## 2022-08-15 PROCEDURE — 97530 THERAPEUTIC ACTIVITIES: CPT

## 2022-08-15 PROCEDURE — 82962 GLUCOSE BLOOD TEST: CPT

## 2022-08-15 PROCEDURE — 80048 BASIC METABOLIC PNL TOTAL CA: CPT | Performed by: HOSPITALIST

## 2022-08-15 PROCEDURE — 97165 OT EVAL LOW COMPLEX 30 MIN: CPT

## 2022-08-15 RX ORDER — MECLIZINE HCL 12.5 MG/1
12.5 TABLET ORAL 3 TIMES DAILY PRN
Status: DISCONTINUED | OUTPATIENT
Start: 2022-08-15 | End: 2022-08-17

## 2022-08-16 ENCOUNTER — ANESTHESIA EVENT (OUTPATIENT)
Dept: SURGERY | Facility: HOSPITAL | Age: 77
End: 2022-08-16
Payer: MEDICARE

## 2022-08-16 ENCOUNTER — APPOINTMENT (OUTPATIENT)
Dept: GENERAL RADIOLOGY | Facility: HOSPITAL | Age: 77
End: 2022-08-16
Attending: UROLOGY
Payer: MEDICARE

## 2022-08-16 ENCOUNTER — ANESTHESIA (OUTPATIENT)
Dept: SURGERY | Facility: HOSPITAL | Age: 77
End: 2022-08-16
Payer: MEDICARE

## 2022-08-16 ENCOUNTER — APPOINTMENT (OUTPATIENT)
Dept: GENERAL RADIOLOGY | Facility: HOSPITAL | Age: 77
DRG: 660 | End: 2022-08-16
Attending: UROLOGY
Payer: MEDICARE

## 2022-08-16 LAB
ANION GAP SERPL CALC-SCNC: 4 MMOL/L (ref 0–18)
BASOPHILS # BLD AUTO: 0.06 X10(3) UL (ref 0–0.2)
BASOPHILS NFR BLD AUTO: 0.7 %
BUN BLD-MCNC: 13 MG/DL (ref 7–18)
BUN/CREAT SERPL: 10.7 (ref 10–20)
CALCIUM BLD-MCNC: 7.6 MG/DL (ref 8.5–10.1)
CHLORIDE SERPL-SCNC: 111 MMOL/L (ref 98–112)
CO2 SERPL-SCNC: 23 MMOL/L (ref 21–32)
CREAT BLD-MCNC: 1.22 MG/DL
DEPRECATED RDW RBC AUTO: 44.8 FL (ref 35.1–46.3)
EOSINOPHIL # BLD AUTO: 0.34 X10(3) UL (ref 0–0.7)
EOSINOPHIL NFR BLD AUTO: 4.1 %
ERYTHROCYTE [DISTWIDTH] IN BLOOD BY AUTOMATED COUNT: 14 % (ref 11–15)
GFR SERPLBLD BASED ON 1.73 SQ M-ARVRAT: 46 ML/MIN/1.73M2 (ref 60–?)
GLUCOSE BLD-MCNC: 131 MG/DL (ref 70–99)
GLUCOSE BLDC GLUCOMTR-MCNC: 130 MG/DL (ref 70–99)
GLUCOSE BLDC GLUCOMTR-MCNC: 133 MG/DL (ref 70–99)
GLUCOSE BLDC GLUCOMTR-MCNC: 135 MG/DL (ref 70–99)
GLUCOSE BLDC GLUCOMTR-MCNC: 154 MG/DL (ref 70–99)
GLUCOSE BLDC GLUCOMTR-MCNC: 190 MG/DL (ref 70–99)
HCT VFR BLD AUTO: 30.8 %
HGB BLD-MCNC: 9.2 G/DL
IMM GRANULOCYTES # BLD AUTO: 0.04 X10(3) UL (ref 0–1)
IMM GRANULOCYTES NFR BLD: 0.5 %
LYMPHOCYTES # BLD AUTO: 2.47 X10(3) UL (ref 1–4)
LYMPHOCYTES NFR BLD AUTO: 29.7 %
MCH RBC QN AUTO: 26.4 PG (ref 26–34)
MCHC RBC AUTO-ENTMCNC: 29.9 G/DL (ref 31–37)
MCV RBC AUTO: 88.5 FL
MONOCYTES # BLD AUTO: 0.69 X10(3) UL (ref 0.1–1)
MONOCYTES NFR BLD AUTO: 8.3 %
NEUTROPHILS # BLD AUTO: 4.71 X10 (3) UL (ref 1.5–7.7)
NEUTROPHILS # BLD AUTO: 4.71 X10(3) UL (ref 1.5–7.7)
NEUTROPHILS NFR BLD AUTO: 56.7 %
OSMOLALITY SERPL CALC.SUM OF ELEC: 288 MOSM/KG (ref 275–295)
PLATELET # BLD AUTO: 277 10(3)UL (ref 150–450)
POTASSIUM SERPL-SCNC: 3.7 MMOL/L (ref 3.5–5.1)
RBC # BLD AUTO: 3.48 X10(6)UL
SODIUM SERPL-SCNC: 138 MMOL/L (ref 136–145)
WBC # BLD AUTO: 8.3 X10(3) UL (ref 4–11)

## 2022-08-16 PROCEDURE — 82962 GLUCOSE BLOOD TEST: CPT

## 2022-08-16 PROCEDURE — BT1D1ZZ FLUOROSCOPY OF RIGHT KIDNEY, URETER AND BLADDER USING LOW OSMOLAR CONTRAST: ICD-10-PCS | Performed by: UROLOGY

## 2022-08-16 PROCEDURE — 0TB38ZX EXCISION OF RIGHT KIDNEY PELVIS, VIA NATURAL OR ARTIFICIAL OPENING ENDOSCOPIC, DIAGNOSTIC: ICD-10-PCS | Performed by: UROLOGY

## 2022-08-16 PROCEDURE — 88341 IMHCHEM/IMCYTCHM EA ADD ANTB: CPT | Performed by: UROLOGY

## 2022-08-16 PROCEDURE — 0TB08ZX EXCISION OF RIGHT KIDNEY, VIA NATURAL OR ARTIFICIAL OPENING ENDOSCOPIC, DIAGNOSTIC: ICD-10-PCS | Performed by: UROLOGY

## 2022-08-16 PROCEDURE — 88108 CYTOPATH CONCENTRATE TECH: CPT | Performed by: UROLOGY

## 2022-08-16 PROCEDURE — 80048 BASIC METABOLIC PNL TOTAL CA: CPT | Performed by: HOSPITALIST

## 2022-08-16 PROCEDURE — 88342 IMHCHEM/IMCYTCHM 1ST ANTB: CPT | Performed by: UROLOGY

## 2022-08-16 PROCEDURE — 0T768DZ DILATION OF RIGHT URETER WITH INTRALUMINAL DEVICE, VIA NATURAL OR ARTIFICIAL OPENING ENDOSCOPIC: ICD-10-PCS | Performed by: UROLOGY

## 2022-08-16 PROCEDURE — 88305 TISSUE EXAM BY PATHOLOGIST: CPT | Performed by: UROLOGY

## 2022-08-16 PROCEDURE — 85025 COMPLETE CBC W/AUTO DIFF WBC: CPT | Performed by: HOSPITALIST

## 2022-08-16 DEVICE — URETERAL STENT
Type: IMPLANTABLE DEVICE | Site: URETER | Status: FUNCTIONAL
Brand: ASCERTA™

## 2022-08-16 RX ORDER — BENZONATATE 100 MG/1
100 CAPSULE ORAL 3 TIMES DAILY PRN
Status: DISCONTINUED | OUTPATIENT
Start: 2022-08-16 | End: 2022-08-17

## 2022-08-16 RX ORDER — SODIUM CHLORIDE, SODIUM LACTATE, POTASSIUM CHLORIDE, CALCIUM CHLORIDE 600; 310; 30; 20 MG/100ML; MG/100ML; MG/100ML; MG/100ML
INJECTION, SOLUTION INTRAVENOUS CONTINUOUS
Status: DISCONTINUED | OUTPATIENT
Start: 2022-08-16 | End: 2022-08-16 | Stop reason: HOSPADM

## 2022-08-16 RX ORDER — LIDOCAINE HYDROCHLORIDE 10 MG/ML
INJECTION, SOLUTION EPIDURAL; INFILTRATION; INTRACAUDAL; PERINEURAL AS NEEDED
Status: DISCONTINUED | OUTPATIENT
Start: 2022-08-16 | End: 2022-08-16 | Stop reason: SURG

## 2022-08-16 RX ORDER — MORPHINE SULFATE 4 MG/ML
4 INJECTION, SOLUTION INTRAMUSCULAR; INTRAVENOUS EVERY 10 MIN PRN
Status: DISCONTINUED | OUTPATIENT
Start: 2022-08-16 | End: 2022-08-16 | Stop reason: HOSPADM

## 2022-08-16 RX ORDER — DEXAMETHASONE SODIUM PHOSPHATE 4 MG/ML
VIAL (ML) INJECTION AS NEEDED
Status: DISCONTINUED | OUTPATIENT
Start: 2022-08-16 | End: 2022-08-16 | Stop reason: SURG

## 2022-08-16 RX ORDER — NALOXONE HYDROCHLORIDE 0.4 MG/ML
80 INJECTION, SOLUTION INTRAMUSCULAR; INTRAVENOUS; SUBCUTANEOUS AS NEEDED
Status: DISCONTINUED | OUTPATIENT
Start: 2022-08-16 | End: 2022-08-16 | Stop reason: HOSPADM

## 2022-08-16 RX ORDER — SODIUM PHOSPHATE, DIBASIC AND SODIUM PHOSPHATE, MONOBASIC 7; 19 G/133ML; G/133ML
1 ENEMA RECTAL ONCE AS NEEDED
Status: DISCONTINUED | OUTPATIENT
Start: 2022-08-16 | End: 2022-08-17

## 2022-08-16 RX ORDER — HYDROMORPHONE HYDROCHLORIDE 1 MG/ML
0.6 INJECTION, SOLUTION INTRAMUSCULAR; INTRAVENOUS; SUBCUTANEOUS EVERY 5 MIN PRN
Status: DISCONTINUED | OUTPATIENT
Start: 2022-08-16 | End: 2022-08-16 | Stop reason: HOSPADM

## 2022-08-16 RX ORDER — HYDROMORPHONE HYDROCHLORIDE 1 MG/ML
0.2 INJECTION, SOLUTION INTRAMUSCULAR; INTRAVENOUS; SUBCUTANEOUS EVERY 5 MIN PRN
Status: DISCONTINUED | OUTPATIENT
Start: 2022-08-16 | End: 2022-08-16 | Stop reason: HOSPADM

## 2022-08-16 RX ORDER — HYDROMORPHONE HYDROCHLORIDE 1 MG/ML
0.4 INJECTION, SOLUTION INTRAMUSCULAR; INTRAVENOUS; SUBCUTANEOUS EVERY 5 MIN PRN
Status: DISCONTINUED | OUTPATIENT
Start: 2022-08-16 | End: 2022-08-16 | Stop reason: HOSPADM

## 2022-08-16 RX ORDER — POLYETHYLENE GLYCOL 3350 17 G/17G
17 POWDER, FOR SOLUTION ORAL DAILY PRN
Status: DISCONTINUED | OUTPATIENT
Start: 2022-08-16 | End: 2022-08-17

## 2022-08-16 RX ORDER — ONDANSETRON 2 MG/ML
INJECTION INTRAMUSCULAR; INTRAVENOUS AS NEEDED
Status: DISCONTINUED | OUTPATIENT
Start: 2022-08-16 | End: 2022-08-16 | Stop reason: SURG

## 2022-08-16 RX ORDER — MORPHINE SULFATE 10 MG/ML
6 INJECTION, SOLUTION INTRAMUSCULAR; INTRAVENOUS EVERY 10 MIN PRN
Status: DISCONTINUED | OUTPATIENT
Start: 2022-08-16 | End: 2022-08-16 | Stop reason: HOSPADM

## 2022-08-16 RX ORDER — BISACODYL 10 MG
10 SUPPOSITORY, RECTAL RECTAL
Status: DISCONTINUED | OUTPATIENT
Start: 2022-08-16 | End: 2022-08-17

## 2022-08-16 RX ORDER — DOCUSATE SODIUM 100 MG/1
100 CAPSULE, LIQUID FILLED ORAL 2 TIMES DAILY
Status: DISCONTINUED | OUTPATIENT
Start: 2022-08-16 | End: 2022-08-17

## 2022-08-16 RX ORDER — MORPHINE SULFATE 4 MG/ML
2 INJECTION, SOLUTION INTRAMUSCULAR; INTRAVENOUS EVERY 10 MIN PRN
Status: DISCONTINUED | OUTPATIENT
Start: 2022-08-16 | End: 2022-08-16 | Stop reason: HOSPADM

## 2022-08-16 RX ADMIN — LIDOCAINE HYDROCHLORIDE 50 MG: 10 INJECTION, SOLUTION EPIDURAL; INFILTRATION; INTRACAUDAL; PERINEURAL at 15:20:00

## 2022-08-16 RX ADMIN — DEXAMETHASONE SODIUM PHOSPHATE 4 MG: 4 MG/ML VIAL (ML) INJECTION at 15:20:00

## 2022-08-16 RX ADMIN — ONDANSETRON 4 MG: 2 INJECTION INTRAMUSCULAR; INTRAVENOUS at 15:20:00

## 2022-08-16 NOTE — OPERATIVE REPORT
[facility]     PATIENT'S NAME: Ailin Clark   ATTENDING PHYSICIAN: Sayda Madrid MD   OPERATING PHYSICIAN: Sayda Madrid MD     MRN: [id number]  : 1945  DATE OF OPERATION: 2022    OPERATIVE REPORT        PREOPERATIVE DIAGNOSIS:  Gross hematuria, right hydronephrosis    POSTOPERATIVE DIAGNOSIS:  Gross hematuria, right hydronephrosis, apparent right upper pole urothelial tumor    PROCEDURE PERFORMED:  Cystoscopy, right retrograde pyelogram, diagnostic right ureteroscopy with biopsy, right ureteral stent placement     ANESTHESIA:  General     ESTIMATED BLOOD LOSS:  Minimal     INTRAVENOUS FLUIDS:  See anesthesia record. URINE OUTPUT:  Not measured     COMPLICATIONS:  None     DRAINS:  Right 6 Fr x 22 cm ureteral stent     SPECIMENS:    1. Right renal pelvis wash for cytology  2. Right upper pole urothelial lesion biopsy  3. Right upper pole wash (area of suspected tumor) for cytology     DISPOSITION:  Stable to recovery room. INDICATIONS:  The patient is a 78year old-year-old female who is admitted for the second time with gross hematuria and right flank pain. Had CT scan showing right hydronephrosis without apparent stones or obstructing lesions. She is brought to the operating room today for diagnostic evaluation. We discussed risks, benefits, and alternatives. She readily agreed to proceed. FINDINGS:  Normal cystourethroscopy. Bloody drainage from right uretral orifice. Mild right hydronephrosis on retrograde pyelogram without ureteral filling defects, but filling defect seen in renal pelvis. On ureteroscopy, large organized clot within upper pole and renal pelvis, which did severely limit visualization. There appeared to be underlying papillary lesion in upper pole infundibulum. PROCEDURE:  Informed consent was obtained. The patient was brought to the operative suite where general anesthesia was administered.     IV antibiotics were administered and SCDs were applied. She was placed in the dorsolithotomy position, prepped, and draped in usual fashion. After timeout was completed, the rigid cystoscope was inserted per urethra and advanced into the bladder under direct vision. The bladder was thoroughly inspected. There were no urothelial lesions. There was bloody drainage emanating from the right ureteral orifice. The right ureteral orifice was cannulated with a 5 [place] open-ended ureteral catheter which was advanced into the ureter over a wire. Retrograde pyelogram was performed. There appeared to be filling defect within the renal pelvis and upper pole but no ureteral defects. A sensor wire was advanced. The ureteral orifice was dilated with a Deanna dilator. A flexible ureteroscope was advanced over the wire into the renal pelvis. Complete nephroscopy was performed. There was a large amount of organized clot within the renal pelvis which did significantly obscure visualization however I was able to get a good look at essentially all of the calyces. The calyces appeared fairly normal with the exception of some urothelial erythema which appeared inflammatory. I performed a wash of the renal pelvis which was sent for cytology. On further nephroscopy it did appear that there was some papillary tissue which seems to be underlying the large clot. This appeared to be in the upper pole infundibulum. Although visualization was limited, this did have the appearance of a papillary tumor. I attempted to take multiple biopsies of this lesion with the Piranha grasper however it was fairly intermixed with clot. I additionally performed a wash of this infundibulum and the location of the suspected tumor and this was also sent for cytology. The ureteroscope was then slowly backed down the ureter and its entire length was inspected. There were no ureteral lesions. The cystoscope was then reinserted.   A sensor wire was readvanced into the right collecting system. A 6 [place] by 22 cm ureteral stent was placed in the usual fashion. Upon removal of the wire, the proximal distal curls were seen in appropriate position. The bladder was emptied and the cystoscope was removed. The patient was returned to the supine position and awakened. She was taken to recovery room in stable condition having tolerated procedure well.     PLAN:  Await pathology  Patient will likely need second look procedure to better characterize upper pole lesion

## 2022-08-16 NOTE — ANESTHESIA PROCEDURE NOTES
Airway  Date/Time: 8/16/2022 3:24 PM  Urgency: Elective    Airway not difficult    General Information and Staff    Patient location during procedure: OR  Resident/CRNA: Arabella Alejo CRNA  Performed: CRNA     Indications and Patient Condition  Indications for airway management: anesthesia  Spontaneous ventilation: present  Sedation level: deep  Preoxygenated: yes  Patient position: sniffing  MILS maintained throughout  Mask difficulty assessment: 1 - vent by mask  No planned trial extubation    Final Airway Details  Final airway type: supraglottic airway      Successful airway: classic  Size 4      Number of attempts at approach: 1    Additional Comments  Atrauamatic / bite block in place

## 2022-08-17 VITALS
TEMPERATURE: 98 F | RESPIRATION RATE: 16 BRPM | WEIGHT: 163.5 LBS | OXYGEN SATURATION: 96 % | SYSTOLIC BLOOD PRESSURE: 142 MMHG | BODY MASS INDEX: 34 KG/M2 | DIASTOLIC BLOOD PRESSURE: 60 MMHG | HEART RATE: 74 BPM

## 2022-08-17 LAB
ANION GAP SERPL CALC-SCNC: 7 MMOL/L (ref 0–18)
BASOPHILS # BLD AUTO: 0.01 X10(3) UL (ref 0–0.2)
BASOPHILS NFR BLD AUTO: 0.1 %
BUN BLD-MCNC: 11 MG/DL (ref 7–18)
BUN/CREAT SERPL: 10.8 (ref 10–20)
CALCIUM BLD-MCNC: 7.8 MG/DL (ref 8.5–10.1)
CHLORIDE SERPL-SCNC: 114 MMOL/L (ref 98–112)
CO2 SERPL-SCNC: 21 MMOL/L (ref 21–32)
CREAT BLD-MCNC: 1.02 MG/DL
DEPRECATED RDW RBC AUTO: 43.7 FL (ref 35.1–46.3)
EOSINOPHIL # BLD AUTO: 0 X10(3) UL (ref 0–0.7)
EOSINOPHIL NFR BLD AUTO: 0 %
ERYTHROCYTE [DISTWIDTH] IN BLOOD BY AUTOMATED COUNT: 13.8 % (ref 11–15)
GFR SERPLBLD BASED ON 1.73 SQ M-ARVRAT: 57 ML/MIN/1.73M2 (ref 60–?)
GLUCOSE BLD-MCNC: 153 MG/DL (ref 70–99)
GLUCOSE BLDC GLUCOMTR-MCNC: 116 MG/DL (ref 70–99)
GLUCOSE BLDC GLUCOMTR-MCNC: 154 MG/DL (ref 70–99)
GLUCOSE BLDC GLUCOMTR-MCNC: 159 MG/DL (ref 70–99)
HCT VFR BLD AUTO: 30.5 %
HGB BLD-MCNC: 9.4 G/DL
IMM GRANULOCYTES # BLD AUTO: 0.03 X10(3) UL (ref 0–1)
IMM GRANULOCYTES NFR BLD: 0.4 %
LYMPHOCYTES # BLD AUTO: 0.88 X10(3) UL (ref 1–4)
LYMPHOCYTES NFR BLD AUTO: 12.8 %
MCH RBC QN AUTO: 26.8 PG (ref 26–34)
MCHC RBC AUTO-ENTMCNC: 30.8 G/DL (ref 31–37)
MCV RBC AUTO: 86.9 FL
MONOCYTES # BLD AUTO: 0.31 X10(3) UL (ref 0.1–1)
MONOCYTES NFR BLD AUTO: 4.5 %
NEUTROPHILS # BLD AUTO: 5.67 X10 (3) UL (ref 1.5–7.7)
NEUTROPHILS # BLD AUTO: 5.67 X10(3) UL (ref 1.5–7.7)
NEUTROPHILS NFR BLD AUTO: 82.2 %
OSMOLALITY SERPL CALC.SUM OF ELEC: 296 MOSM/KG (ref 275–295)
PLATELET # BLD AUTO: 319 10(3)UL (ref 150–450)
POTASSIUM SERPL-SCNC: 4 MMOL/L (ref 3.5–5.1)
RBC # BLD AUTO: 3.51 X10(6)UL
SODIUM SERPL-SCNC: 142 MMOL/L (ref 136–145)
WBC # BLD AUTO: 6.9 X10(3) UL (ref 4–11)

## 2022-08-17 PROCEDURE — 80048 BASIC METABOLIC PNL TOTAL CA: CPT | Performed by: UROLOGY

## 2022-08-17 PROCEDURE — 82962 GLUCOSE BLOOD TEST: CPT

## 2022-08-17 PROCEDURE — 85025 COMPLETE CBC W/AUTO DIFF WBC: CPT | Performed by: UROLOGY

## 2022-08-17 RX ORDER — POLYETHYLENE GLYCOL 3350 17 G/17G
17 POWDER, FOR SOLUTION ORAL DAILY PRN
Qty: 12 EACH | Refills: 0 | Status: SHIPPED | OUTPATIENT
Start: 2022-08-17

## 2022-08-17 RX ORDER — CEPHALEXIN 500 MG/1
500 CAPSULE ORAL 3 TIMES DAILY
Qty: 10 CAPSULE | Refills: 0 | Status: SHIPPED | OUTPATIENT
Start: 2022-08-17 | End: 2022-08-22

## 2022-08-17 RX ORDER — ACETAMINOPHEN 500 MG
500 TABLET ORAL EVERY 6 HOURS PRN
Qty: 1 TABLET | Refills: 0 | Status: SHIPPED | COMMUNITY
Start: 2022-08-17

## 2022-08-17 RX ORDER — PHENAZOPYRIDINE HYDROCHLORIDE 100 MG/1
100 TABLET, FILM COATED ORAL 3 TIMES DAILY PRN
Status: DISCONTINUED | OUTPATIENT
Start: 2022-08-17 | End: 2022-08-17

## 2022-08-17 RX ORDER — PHENAZOPYRIDINE HYDROCHLORIDE 100 MG/1
100 TABLET, FILM COATED ORAL 3 TIMES DAILY PRN
Qty: 6 TABLET | Refills: 0 | Status: SHIPPED | OUTPATIENT
Start: 2022-08-17 | End: 2022-08-22

## 2022-08-17 RX ORDER — BENZONATATE 100 MG/1
100 CAPSULE ORAL 3 TIMES DAILY PRN
Qty: 12 CAPSULE | Refills: 0 | Status: SHIPPED | OUTPATIENT
Start: 2022-08-17 | End: 2022-08-22

## 2022-08-17 NOTE — DISCHARGE SUMMARY
Dc summary#[id number]  > 30 min spent on [address] [facility] [facility] [address] Discharge Diagnoses: right hydronephrosis/hematuria    Lace+ Score: 77  59-90 High Risk  29-58 Medium Risk  0-28   Low Risk. TCM Follow-Up Recommendation:  LACE > 58:  High Risk of readmission after discharge from the hospital.tcm fu recommended

## 2022-08-18 ENCOUNTER — APPOINTMENT (OUTPATIENT)
Dept: ULTRASOUND IMAGING | Facility: HOSPITAL | Age: 77
End: 2022-08-18
Attending: STUDENT IN AN ORGANIZED HEALTH CARE EDUCATION/TRAINING PROGRAM
Payer: MEDICARE

## 2022-08-18 ENCOUNTER — APPOINTMENT (OUTPATIENT)
Dept: GENERAL RADIOLOGY | Facility: HOSPITAL | Age: 77
End: 2022-08-18
Attending: STUDENT IN AN ORGANIZED HEALTH CARE EDUCATION/TRAINING PROGRAM
Payer: MEDICARE

## 2022-08-18 ENCOUNTER — PATIENT OUTREACH (OUTPATIENT)
Dept: CASE MANAGEMENT | Age: 77
End: 2022-08-18

## 2022-08-18 ENCOUNTER — APPOINTMENT (OUTPATIENT)
Dept: CT IMAGING | Facility: HOSPITAL | Age: 77
End: 2022-08-18
Attending: STUDENT IN AN ORGANIZED HEALTH CARE EDUCATION/TRAINING PROGRAM
Payer: MEDICARE

## 2022-08-18 ENCOUNTER — HOSPITAL ENCOUNTER (OUTPATIENT)
Facility: HOSPITAL | Age: 77
Setting detail: OBSERVATION
Discharge: HOME | End: 2022-08-22
Attending: STUDENT IN AN ORGANIZED HEALTH CARE EDUCATION/TRAINING PROGRAM | Admitting: HOSPITALIST
Payer: MEDICARE

## 2022-08-18 DIAGNOSIS — N12 PYELONEPHRITIS: ICD-10-CM

## 2022-08-18 DIAGNOSIS — R06.02 SHORTNESS OF BREATH: Primary | ICD-10-CM

## 2022-08-18 DIAGNOSIS — Z02.9 ENCOUNTERS FOR UNSPECIFIED ADMINISTRATIVE PURPOSE: ICD-10-CM

## 2022-08-18 LAB
ALBUMIN SERPL-MCNC: 2.8 G/DL (ref 3.4–5)
ALP LIVER SERPL-CCNC: 292 U/L
ALT SERPL-CCNC: 33 U/L
ANION GAP SERPL CALC-SCNC: 7 MMOL/L (ref 0–18)
AST SERPL-CCNC: 78 U/L (ref 15–37)
BASOPHILS # BLD AUTO: 0.05 X10(3) UL (ref 0–0.2)
BASOPHILS NFR BLD AUTO: 0.6 %
BILIRUB DIRECT SERPL-MCNC: 0.2 MG/DL (ref 0–0.2)
BILIRUB SERPL-MCNC: 0.3 MG/DL (ref 0.1–2)
BUN BLD-MCNC: 16 MG/DL (ref 7–18)
BUN/CREAT SERPL: 12.8 (ref 10–20)
CALCIUM BLD-MCNC: 8.4 MG/DL (ref 8.5–10.1)
CHLORIDE SERPL-SCNC: 111 MMOL/L (ref 98–112)
CO2 SERPL-SCNC: 25 MMOL/L (ref 21–32)
CREAT BLD-MCNC: 1.25 MG/DL
DEPRECATED RDW RBC AUTO: 43.9 FL (ref 35.1–46.3)
EOSINOPHIL # BLD AUTO: 0.21 X10(3) UL (ref 0–0.7)
EOSINOPHIL NFR BLD AUTO: 2.4 %
ERYTHROCYTE [DISTWIDTH] IN BLOOD BY AUTOMATED COUNT: 14.3 % (ref 11–15)
GFR SERPLBLD BASED ON 1.73 SQ M-ARVRAT: 45 ML/MIN/1.73M2 (ref 60–?)
GLUCOSE BLD-MCNC: 106 MG/DL (ref 70–99)
GLUCOSE BLDC GLUCOMTR-MCNC: 141 MG/DL (ref 70–99)
HCT VFR BLD AUTO: 32.4 %
HGB BLD-MCNC: 10 G/DL
IMM GRANULOCYTES # BLD AUTO: 0.07 X10(3) UL (ref 0–1)
IMM GRANULOCYTES NFR BLD: 0.8 %
LYMPHOCYTES # BLD AUTO: 2.29 X10(3) UL (ref 1–4)
LYMPHOCYTES NFR BLD AUTO: 26.3 %
MCH RBC QN AUTO: 26.5 PG (ref 26–34)
MCHC RBC AUTO-ENTMCNC: 30.9 G/DL (ref 31–37)
MCV RBC AUTO: 85.9 FL
MONOCYTES # BLD AUTO: 0.8 X10(3) UL (ref 0.1–1)
MONOCYTES NFR BLD AUTO: 9.2 %
NEUTROPHILS # BLD AUTO: 5.3 X10 (3) UL (ref 1.5–7.7)
NEUTROPHILS # BLD AUTO: 5.3 X10(3) UL (ref 1.5–7.7)
NEUTROPHILS NFR BLD AUTO: 60.7 %
NT-PROBNP SERPL-MCNC: 2492 PG/ML (ref ?–450)
OSMOLALITY SERPL CALC.SUM OF ELEC: 298 MOSM/KG (ref 275–295)
PLATELET # BLD AUTO: 372 10(3)UL (ref 150–450)
POTASSIUM SERPL-SCNC: 3.5 MMOL/L (ref 3.5–5.1)
PROCALCITONIN SERPL-MCNC: 0.1 NG/ML (ref ?–0.16)
PROT SERPL-MCNC: 6.6 G/DL (ref 6.4–8.2)
RBC # BLD AUTO: 3.77 X10(6)UL
SARS-COV-2 RNA RESP QL NAA+PROBE: NOT DETECTED
SODIUM SERPL-SCNC: 143 MMOL/L (ref 136–145)
TROPONIN I HIGH SENSITIVITY: 22 NG/L
WBC # BLD AUTO: 8.7 X10(3) UL (ref 4–11)

## 2022-08-18 PROCEDURE — 80076 HEPATIC FUNCTION PANEL: CPT | Performed by: STUDENT IN AN ORGANIZED HEALTH CARE EDUCATION/TRAINING PROGRAM

## 2022-08-18 PROCEDURE — 71260 CT THORAX DX C+: CPT | Performed by: STUDENT IN AN ORGANIZED HEALTH CARE EDUCATION/TRAINING PROGRAM

## 2022-08-18 PROCEDURE — 82962 GLUCOSE BLOOD TEST: CPT

## 2022-08-18 PROCEDURE — 99285 EMERGENCY DEPT VISIT HI MDM: CPT

## 2022-08-18 PROCEDURE — 93005 ELECTROCARDIOGRAM TRACING: CPT

## 2022-08-18 PROCEDURE — 80048 BASIC METABOLIC PNL TOTAL CA: CPT | Performed by: STUDENT IN AN ORGANIZED HEALTH CARE EDUCATION/TRAINING PROGRAM

## 2022-08-18 PROCEDURE — 85025 COMPLETE CBC W/AUTO DIFF WBC: CPT | Performed by: STUDENT IN AN ORGANIZED HEALTH CARE EDUCATION/TRAINING PROGRAM

## 2022-08-18 PROCEDURE — 84484 ASSAY OF TROPONIN QUANT: CPT | Performed by: STUDENT IN AN ORGANIZED HEALTH CARE EDUCATION/TRAINING PROGRAM

## 2022-08-18 PROCEDURE — 1111F DSCHRG MED/CURRENT MED MERGE: CPT

## 2022-08-18 PROCEDURE — 71045 X-RAY EXAM CHEST 1 VIEW: CPT | Performed by: STUDENT IN AN ORGANIZED HEALTH CARE EDUCATION/TRAINING PROGRAM

## 2022-08-18 PROCEDURE — 84145 PROCALCITONIN (PCT): CPT | Performed by: STUDENT IN AN ORGANIZED HEALTH CARE EDUCATION/TRAINING PROGRAM

## 2022-08-18 PROCEDURE — 93010 ELECTROCARDIOGRAM REPORT: CPT | Performed by: STUDENT IN AN ORGANIZED HEALTH CARE EDUCATION/TRAINING PROGRAM

## 2022-08-18 PROCEDURE — 93970 EXTREMITY STUDY: CPT | Performed by: STUDENT IN AN ORGANIZED HEALTH CARE EDUCATION/TRAINING PROGRAM

## 2022-08-18 PROCEDURE — 96374 THER/PROPH/DIAG INJ IV PUSH: CPT

## 2022-08-18 PROCEDURE — 96375 TX/PRO/DX INJ NEW DRUG ADDON: CPT

## 2022-08-18 PROCEDURE — 83880 ASSAY OF NATRIURETIC PEPTIDE: CPT | Performed by: STUDENT IN AN ORGANIZED HEALTH CARE EDUCATION/TRAINING PROGRAM

## 2022-08-18 RX ORDER — FUROSEMIDE 10 MG/ML
40 INJECTION INTRAMUSCULAR; INTRAVENOUS ONCE
Status: COMPLETED | OUTPATIENT
Start: 2022-08-18 | End: 2022-08-18

## 2022-08-18 NOTE — DISCHARGE SUMMARY
[facility]    PATIENT'S NAME: Shira Tinajero   ATTENDING PHYSICIAN: Frederic Rodriguez MD   PATIENT ACCOUNT#:   [id number]    LOCATION:  [address] [place] [address] #:   [id number]       YOB: 1945  ADMISSION DATE:       08/13/2022      DISCHARGE DATE:  08/17/2022    DISCHARGE SUMMARY    Forty minutes were spent preparing this discharge. DISCHARGE DIAGNOSIS:  A patient with right hydronephrosis, obstruction from clots, and hematuria. HISTORY AND HOSPITAL COURSE:  This is a very pleasant 77-year-old  American female who presents with a history of right flank and abdominal pain. The patient was admitted to the hospital and seen by Dr. Antoine Gonzalez who in turn performed a cystoscopy and right retrograde pyelogram with ureteroscopy, biopsy, and right ureteral stent placement and evacuation of a clot. She biopsied a suspicious area of the kidney. The patient recovered well and was cleared for discharge by Dr. Antoine Gonzalez. She felt well. She had no issues with the stent. Her renal function improved. She was able to be discharged home. PHYSICAL EXAMINATION:    VITAL SIGNS:  On discharge, temperature 98, pulse 74, respiratory rate 16, 142/60, 96%. LUNGS:  Clear. HEART:  Normal S1, S2. No S3.  ABDOMEN:  Soft. EXTREMITIES:  Without edema. NEUROLOGIC:  She is alert, oriented, friendly, and cooperative and understands all my discharge instructions. LABORATORY STUDIES:  Please see chart. ASSESSMENT AND PLAN:    1. Hydronephrosis and hematuria and also pyelonephritis. Continue antibiotics p.o. Await biopsies for suspicious area in the kidney. The patient received stent. 2.   Type 2 diabetes. Continue medication. 3.   Acute renal failure possibly on chronic kidney disease stage 3. Now better. 4.   Cough and sore throat. The patient doing well. CONDITION ON DISCHARGE:  Stable. CODE STATUS:  Full Code. DISCHARGE MEDICATIONS:    1.    Metformin 1000 mg daily with breakfast.  Stop if nausea or vomiting. 2.   Sitagliptin 100 mg daily. 3.   Atorvastatin 10 mg every night. 4.   Meclizine 25 mg twice a day as needed for dizziness. 5.   Travatan Z 0.004% 1 drop each eye at bedtime. 6.   Tylenol 500 mg every 6 hours as needed for pain. Watch total daily Tylenol limit to 3 g.  7.   Tessalon Perles 100 mg 3 times a day. 8.   Pyridium 100 mg 3 times a day as needed. Watch for orange urine, rash, orange sweat or tears. 9.   MiraLAX 17 g daily. 10.   Keflex 500 mg 3 times a day for 10 more doses starting tonight. Stop if rash. DIET:  Low fat, low salt, 2000-calorie ADA. ACTIVITY:  No heavy exercise, otherwise as tolerated. FOLLOWUPJose Ridley MD in a few days and Dr. Kenny Pittman in about 3 weeks for biopsy results and for evaluation of stent removal, etc.    RISK OF READMISSION:  High. TCM followup definitely recommended. Dictated By Baudilio Mari.  MD Jennifer  d: 08/17/2022 17:11:02  t: 08/18/2022 08:18:59  Job [id number]  LAS/

## 2022-08-18 NOTE — ED INITIAL ASSESSMENT (HPI)
Patient arrives ambulatory through triage with complaints of SOB, chest pain, BLE swelling. Patient reports she was feeling like this prior to discharge. Patient was admitted from 8/11-8/17 for pyelonephritis, had a stent placed.    +dizzy

## 2022-08-18 NOTE — PROGRESS NOTES
Received VM from patient requesting assistance scheduling apt     Dr.Melanie Solomon Borges, [facility]  [address]  Rik Aguirre [address] [place] [address]  [phone]

## 2022-08-19 ENCOUNTER — APPOINTMENT (OUTPATIENT)
Dept: CV DIAGNOSTICS | Facility: HOSPITAL | Age: 77
End: 2022-08-19
Attending: STUDENT IN AN ORGANIZED HEALTH CARE EDUCATION/TRAINING PROGRAM
Payer: MEDICARE

## 2022-08-19 LAB
GLUCOSE BLDC GLUCOMTR-MCNC: 131 MG/DL (ref 70–99)
GLUCOSE BLDC GLUCOMTR-MCNC: 144 MG/DL (ref 70–99)
GLUCOSE BLDC GLUCOMTR-MCNC: 151 MG/DL (ref 70–99)
GLUCOSE BLDC GLUCOMTR-MCNC: 153 MG/DL (ref 70–99)
TROPONIN I HIGH SENSITIVITY: 18 NG/L

## 2022-08-19 PROCEDURE — 96365 THER/PROPH/DIAG IV INF INIT: CPT

## 2022-08-19 PROCEDURE — 93306 TTE W/DOPPLER COMPLETE: CPT | Performed by: STUDENT IN AN ORGANIZED HEALTH CARE EDUCATION/TRAINING PROGRAM

## 2022-08-19 PROCEDURE — 96376 TX/PRO/DX INJ SAME DRUG ADON: CPT

## 2022-08-19 PROCEDURE — 82962 GLUCOSE BLOOD TEST: CPT

## 2022-08-19 PROCEDURE — 84484 ASSAY OF TROPONIN QUANT: CPT | Performed by: HOSPITALIST

## 2022-08-19 PROCEDURE — 96372 THER/PROPH/DIAG INJ SC/IM: CPT

## 2022-08-19 RX ORDER — FUROSEMIDE 10 MG/ML
40 INJECTION INTRAMUSCULAR; INTRAVENOUS DAILY
Status: DISCONTINUED | OUTPATIENT
Start: 2022-08-19 | End: 2022-08-20

## 2022-08-19 RX ORDER — DEXTROSE MONOHYDRATE 25 G/50ML
50 INJECTION, SOLUTION INTRAVENOUS
Status: DISCONTINUED | OUTPATIENT
Start: 2022-08-19 | End: 2022-08-22

## 2022-08-19 RX ORDER — HYDRALAZINE HYDROCHLORIDE 20 MG/ML
10 INJECTION INTRAMUSCULAR; INTRAVENOUS EVERY 4 HOURS PRN
Status: DISCONTINUED | OUTPATIENT
Start: 2022-08-19 | End: 2022-08-22

## 2022-08-19 RX ORDER — ISOSORBIDE MONONITRATE 30 MG/1
30 TABLET, EXTENDED RELEASE ORAL DAILY
Status: DISCONTINUED | OUTPATIENT
Start: 2022-08-19 | End: 2022-08-22

## 2022-08-19 RX ORDER — HEPARIN SODIUM 5000 [USP'U]/ML
5000 INJECTION, SOLUTION INTRAVENOUS; SUBCUTANEOUS EVERY 12 HOURS SCHEDULED
Status: DISCONTINUED | OUTPATIENT
Start: 2022-08-19 | End: 2022-08-22

## 2022-08-19 RX ORDER — POLYETHYLENE GLYCOL 3350 17 G/17G
17 POWDER, FOR SOLUTION ORAL DAILY PRN
Status: DISCONTINUED | OUTPATIENT
Start: 2022-08-19 | End: 2022-08-22

## 2022-08-19 RX ORDER — ONDANSETRON 2 MG/ML
4 INJECTION INTRAMUSCULAR; INTRAVENOUS EVERY 6 HOURS PRN
Status: DISCONTINUED | OUTPATIENT
Start: 2022-08-19 | End: 2022-08-22

## 2022-08-19 RX ORDER — LATANOPROST 50 UG/ML
1 SOLUTION/ DROPS OPHTHALMIC NIGHTLY
Refills: 0 | Status: DISCONTINUED | OUTPATIENT
Start: 2022-08-19 | End: 2022-08-22

## 2022-08-19 RX ORDER — ZOLPIDEM TARTRATE 5 MG/1
5 TABLET ORAL NIGHTLY PRN
Status: DISCONTINUED | OUTPATIENT
Start: 2022-08-19 | End: 2022-08-22

## 2022-08-19 RX ORDER — ATORVASTATIN CALCIUM 10 MG/1
10 TABLET, FILM COATED ORAL NIGHTLY
Status: DISCONTINUED | OUTPATIENT
Start: 2022-08-19 | End: 2022-08-22

## 2022-08-19 RX ORDER — PANTOPRAZOLE SODIUM 40 MG/1
40 TABLET, DELAYED RELEASE ORAL
Status: DISCONTINUED | OUTPATIENT
Start: 2022-08-19 | End: 2022-08-22

## 2022-08-19 RX ORDER — NICOTINE POLACRILEX 4 MG
30 LOZENGE BUCCAL
Status: DISCONTINUED | OUTPATIENT
Start: 2022-08-19 | End: 2022-08-22

## 2022-08-19 RX ORDER — ACETAMINOPHEN 325 MG/1
650 TABLET ORAL EVERY 6 HOURS PRN
Status: DISCONTINUED | OUTPATIENT
Start: 2022-08-19 | End: 2022-08-22

## 2022-08-19 RX ORDER — NICOTINE POLACRILEX 4 MG
15 LOZENGE BUCCAL
Status: DISCONTINUED | OUTPATIENT
Start: 2022-08-19 | End: 2022-08-22

## 2022-08-19 RX ORDER — MAGNESIUM HYDROXIDE/ALUMINUM HYDROXICE/SIMETHICONE 120; 1200; 1200 MG/30ML; MG/30ML; MG/30ML
30 SUSPENSION ORAL 4 TIMES DAILY PRN
Status: DISCONTINUED | OUTPATIENT
Start: 2022-08-19 | End: 2022-08-22

## 2022-08-19 RX ORDER — HYDROCODONE BITARTRATE AND ACETAMINOPHEN 5; 325 MG/1; MG/1
1 TABLET ORAL EVERY 6 HOURS PRN
Status: DISCONTINUED | OUTPATIENT
Start: 2022-08-19 | End: 2022-08-20

## 2022-08-19 NOTE — PLAN OF CARE
Problem: Patient Centered Care  Goal: Patient preferences are identified and integrated in the patient's plan of care  Description: Interventions:  - What would you like us to know as we care for you? Pt states she wasn't feeling well after discharge.   Provide timely, complete, and accurate information to patient/family  - Incorporate patient and family knowledge, values, beliefs, and cultural backgrounds into the planning and delivery of care  - Encourage patient/family to participate in care and decision-making at the level they choose  - Honor patient and family perspectives and choices  Outcome: Progressing     Problem: PAIN - ADULT  Goal: Verbalizes/displays adequate comfort level or patient's stated pain goal  Description: INTERVENTIONS:  - Encourage pt to monitor pain and request assistance  - Assess pain using appropriate pain scale  - Administer analgesics based on type and severity of pain and evaluate response  - Implement non-pharmacological measures as appropriate and evaluate response  - Consider cultural and social influences on pain and pain management  - Manage/alleviate anxiety  - Utilize distraction and/or relaxation techniques  - Monitor for opioid side effects  - Notify MD/LIP if interventions unsuccessful or patient reports new pain  - Anticipate increased pain with activity and pre-medicate as appropriate  Outcome: Progressing     Problem: Patient/Family Goals  Goal: Patient/Family Long Term Goal  Description: Patient's Long Term Goal: To be back to baseline health condition    Interventions:  - Monitor and follow plan of care  - See additional Care Plan goals for specific interventions  Outcome: Progressing  Goal: Patient/Family Short Term Goal  Description: Patient's Short Term Goal: \"to get rid of some of this water\"    Interventions:   - Follow plan of care  - See additional Care Plan goals for specific interventions  Outcome: Progressing     Problem: SAFETY ADULT - FALL  Goal: Free from fall injury  Description: INTERVENTIONS:  - Assess pt frequently for physical needs  - Identify cognitive and physical deficits and behaviors that affect risk of falls.   - [place] fall precautions as indicated by assessment.  - Educate pt/family on patient safety including physical limitations  - Instruct pt to call for assistance with activity based on assessment  - Modify environment to reduce risk of injury  - Provide assistive devices as appropriate  - Consider OT/PT consult to assist with strengthening/mobility  - Encourage toileting schedule  Outcome: Progressing

## 2022-08-19 NOTE — PLAN OF CARE
Problem: Patient Centered Care  Goal: Patient preferences are identified and integrated in the patient's plan of care  Description: Interventions:  - What would you like us to know as we care for you?   - Provide timely, complete, and accurate information to patient/family  - Incorporate patient and family knowledge, values, beliefs, and cultural backgrounds into the planning and delivery of care  - Encourage patient/family to participate in care and decision-making at the level they choose  - Honor patient and family perspectives and choices  Outcome: Progressing     Problem: PAIN - ADULT  Goal: Verbalizes/displays adequate comfort level or patient's stated pain goal  Description: INTERVENTIONS:  - Encourage pt to monitor pain and request assistance  - Assess pain using appropriate pain scale  - Administer analgesics based on type and severity of pain and evaluate response  - Implement non-pharmacological measures as appropriate and evaluate response  - Consider cultural and social influences on pain and pain management  - Manage/alleviate anxiety  - Utilize distraction and/or relaxation techniques  - Monitor for opioid side effects  - Notify MD/LIP if interventions unsuccessful or patient reports new pain  - Anticipate increased pain with activity and pre-medicate as appropriate  Outcome: Progressing     Problem: Patient/Family Goals  Goal: Patient/Family Long Term Goal  Description: Patient's Long Term Goal: to return home    Interventions:  - diuretics  -IV antibiotics  - See additional Care Plan goals for specific interventions  Outcome: Progressing  Goal: Patient/Family Short Term Goal  Description: Patient's Short Term Goal: to feel better    Interventions:   - follow MD recommendations  - See additional Care Plan goals for specific interventions  Outcome: Progressing     Problem: SAFETY ADULT - FALL  Goal: Free from fall injury  Description: INTERVENTIONS:  - Assess pt frequently for physical needs  - Identify cognitive and physical deficits and behaviors that affect risk of falls.   - [place] fall precautions as indicated by assessment.  - Educate pt/family on patient safety including physical limitations  - Instruct pt to call for assistance with activity based on assessment  - Modify environment to reduce risk of injury  - Provide assistive devices as appropriate  - Consider OT/PT consult to assist with strengthening/mobility  - Encourage toileting schedule  Outcome: Progressing

## 2022-08-19 NOTE — ED QUICK NOTES
Orders for admission, patient is aware of plan and ready to go upstairs. Any questions, please call ED RN Nima RN  at extension [phone].    Type of COVID test sent:Rapid  COVID Suspicion level: Negative    Titratable drug(s) infusing:  Rate:    LOC at time of transport:  A&Ox4    Other pertinent information    CIWA score=  NIH score=

## 2022-08-20 LAB
ANION GAP SERPL CALC-SCNC: 8 MMOL/L (ref 0–18)
BASOPHILS # BLD AUTO: 0.06 X10(3) UL (ref 0–0.2)
BASOPHILS NFR BLD AUTO: 0.8 %
BUN BLD-MCNC: 15 MG/DL (ref 7–18)
BUN/CREAT SERPL: 14.2 (ref 10–20)
CALCIUM BLD-MCNC: 8.5 MG/DL (ref 8.5–10.1)
CHLORIDE SERPL-SCNC: 102 MMOL/L (ref 98–112)
CO2 SERPL-SCNC: 31 MMOL/L (ref 21–32)
CREAT BLD-MCNC: 1.06 MG/DL
DEPRECATED RDW RBC AUTO: 44.9 FL (ref 35.1–46.3)
EOSINOPHIL # BLD AUTO: 0.44 X10(3) UL (ref 0–0.7)
EOSINOPHIL NFR BLD AUTO: 5.6 %
ERYTHROCYTE [DISTWIDTH] IN BLOOD BY AUTOMATED COUNT: 14.4 % (ref 11–15)
GFR SERPLBLD BASED ON 1.73 SQ M-ARVRAT: 55 ML/MIN/1.73M2 (ref 60–?)
GLUCOSE BLD-MCNC: 128 MG/DL (ref 70–99)
GLUCOSE BLDC GLUCOMTR-MCNC: 147 MG/DL (ref 70–99)
GLUCOSE BLDC GLUCOMTR-MCNC: 156 MG/DL (ref 70–99)
GLUCOSE BLDC GLUCOMTR-MCNC: 162 MG/DL (ref 70–99)
GLUCOSE BLDC GLUCOMTR-MCNC: 195 MG/DL (ref 70–99)
HCT VFR BLD AUTO: 32.5 %
HGB BLD-MCNC: 9.9 G/DL
IMM GRANULOCYTES # BLD AUTO: 0.06 X10(3) UL (ref 0–1)
IMM GRANULOCYTES NFR BLD: 0.8 %
LYMPHOCYTES # BLD AUTO: 3.17 X10(3) UL (ref 1–4)
LYMPHOCYTES NFR BLD AUTO: 40.2 %
MAGNESIUM SERPL-MCNC: 1.3 MG/DL (ref 1.6–2.6)
MCH RBC QN AUTO: 26.3 PG (ref 26–34)
MCHC RBC AUTO-ENTMCNC: 30.5 G/DL (ref 31–37)
MCV RBC AUTO: 86.2 FL
MONOCYTES # BLD AUTO: 0.64 X10(3) UL (ref 0.1–1)
MONOCYTES NFR BLD AUTO: 8.1 %
NEUTROPHILS # BLD AUTO: 3.51 X10 (3) UL (ref 1.5–7.7)
NEUTROPHILS # BLD AUTO: 3.51 X10(3) UL (ref 1.5–7.7)
NEUTROPHILS NFR BLD AUTO: 44.5 %
OSMOLALITY SERPL CALC.SUM OF ELEC: 294 MOSM/KG (ref 275–295)
PHOSPHATE SERPL-MCNC: 4.1 MG/DL (ref 2.5–4.9)
PLATELET # BLD AUTO: 428 10(3)UL (ref 150–450)
POTASSIUM SERPL-SCNC: 2.8 MMOL/L (ref 3.5–5.1)
POTASSIUM SERPL-SCNC: 2.8 MMOL/L (ref 3.5–5.1)
RBC # BLD AUTO: 3.77 X10(6)UL
SODIUM SERPL-SCNC: 141 MMOL/L (ref 136–145)
TROPONIN I HIGH SENSITIVITY: 11 NG/L
TROPONIN I HIGH SENSITIVITY: 12 NG/L
TROPONIN I HIGH SENSITIVITY: 23 NG/L
WBC # BLD AUTO: 7.9 X10(3) UL (ref 4–11)

## 2022-08-20 PROCEDURE — 96367 TX/PROPH/DG ADDL SEQ IV INF: CPT

## 2022-08-20 PROCEDURE — 96372 THER/PROPH/DIAG INJ SC/IM: CPT

## 2022-08-20 PROCEDURE — 84100 ASSAY OF PHOSPHORUS: CPT | Performed by: HOSPITALIST

## 2022-08-20 PROCEDURE — 96376 TX/PRO/DX INJ SAME DRUG ADON: CPT

## 2022-08-20 PROCEDURE — 83735 ASSAY OF MAGNESIUM: CPT | Performed by: HOSPITALIST

## 2022-08-20 PROCEDURE — 84132 ASSAY OF SERUM POTASSIUM: CPT | Performed by: HOSPITALIST

## 2022-08-20 PROCEDURE — 96375 TX/PRO/DX INJ NEW DRUG ADDON: CPT

## 2022-08-20 PROCEDURE — 82962 GLUCOSE BLOOD TEST: CPT

## 2022-08-20 PROCEDURE — 96366 THER/PROPH/DIAG IV INF ADDON: CPT

## 2022-08-20 PROCEDURE — 85025 COMPLETE CBC W/AUTO DIFF WBC: CPT | Performed by: HOSPITALIST

## 2022-08-20 PROCEDURE — 80048 BASIC METABOLIC PNL TOTAL CA: CPT | Performed by: HOSPITALIST

## 2022-08-20 PROCEDURE — 93010 ELECTROCARDIOGRAM REPORT: CPT | Performed by: HOSPITALIST

## 2022-08-20 PROCEDURE — 84484 ASSAY OF TROPONIN QUANT: CPT | Performed by: HOSPITALIST

## 2022-08-20 PROCEDURE — 93005 ELECTROCARDIOGRAM TRACING: CPT

## 2022-08-20 RX ORDER — POTASSIUM CHLORIDE 1.5 G/1.77G
40 POWDER, FOR SOLUTION ORAL EVERY 4 HOURS
Status: COMPLETED | OUTPATIENT
Start: 2022-08-20 | End: 2022-08-20

## 2022-08-20 RX ORDER — LISINOPRIL 5 MG/1
5 TABLET ORAL DAILY
Status: DISCONTINUED | OUTPATIENT
Start: 2022-08-20 | End: 2022-08-22

## 2022-08-20 RX ORDER — FUROSEMIDE 40 MG/1
40 TABLET ORAL DAILY
Status: DISCONTINUED | OUTPATIENT
Start: 2022-08-21 | End: 2022-08-22

## 2022-08-20 RX ORDER — SPIRONOLACTONE 25 MG/1
25 TABLET ORAL DAILY
Status: DISCONTINUED | OUTPATIENT
Start: 2022-08-20 | End: 2022-08-22

## 2022-08-20 RX ORDER — CYCLOBENZAPRINE HCL 5 MG
5 TABLET ORAL 3 TIMES DAILY PRN
Status: DISCONTINUED | OUTPATIENT
Start: 2022-08-20 | End: 2022-08-22

## 2022-08-20 RX ORDER — POTASSIUM CHLORIDE 20 MEQ/1
40 TABLET, EXTENDED RELEASE ORAL EVERY 4 HOURS
Status: COMPLETED | OUTPATIENT
Start: 2022-08-20 | End: 2022-08-20

## 2022-08-20 RX ORDER — HYDROCODONE BITARTRATE AND ACETAMINOPHEN 5; 325 MG/1; MG/1
1 TABLET ORAL EVERY 6 HOURS PRN
Status: DISCONTINUED | OUTPATIENT
Start: 2022-08-20 | End: 2022-08-22

## 2022-08-20 NOTE — PLAN OF CARE
Problem: Patient Centered Care  Goal: Patient preferences are identified and integrated in the patient's plan of care  Description: Interventions:  - What would you like us to know as we care for you?  \"The swelling in my legs is going down\"  - Provide timely, complete, and accurate information to patient/family  - Incorporate patient and family knowledge, values, beliefs, and cultural backgrounds into the planning and delivery of care  - Encourage patient/family to participate in care and decision-making at the level they choose  - Honor patient and family perspectives and choices  Outcome: Progressing     Problem: PAIN - ADULT  Goal: Verbalizes/displays adequate comfort level or patient's stated pain goal  Description: INTERVENTIONS:  - Encourage pt to monitor pain and request assistance  - Assess pain using appropriate pain scale  - Administer analgesics based on type and severity of pain and evaluate response  - Implement non-pharmacological measures as appropriate and evaluate response  - Consider cultural and social influences on pain and pain management  - Manage/alleviate anxiety  - Utilize distraction and/or relaxation techniques  - Monitor for opioid side effects  - Notify MD/LIP if interventions unsuccessful or patient reports new pain  - Anticipate increased pain with activity and pre-medicate as appropriate  Outcome: Progressing     Problem: Patient/Family Goals  Goal: Patient/Family Long Term Goal  Description: Patient's Long Term Goal: Be medically stable    Interventions:  - Follow POC  - See additional Care Plan goals for specific interventions  Outcome: Progressing  Goal: Patient/Family Short Term Goal  Description: Patient's Short Term Goal: Reduce the excess fluid at BLE    Interventions:   - Follow POC  - See additional Care Plan goals for specific interventions  Outcome: Progressing     Problem: SAFETY ADULT - FALL  Goal: Free from fall injury  Description: INTERVENTIONS:  - Assess pt frequently for physical needs  - Identify cognitive and physical deficits and behaviors that affect risk of falls.   - [place] fall precautions as indicated by assessment.  - Educate pt/family on patient safety including physical limitations  - Instruct pt to call for assistance with activity based on assessment  - Modify environment to reduce risk of injury  - Provide assistive devices as appropriate  - Consider OT/PT consult to assist with strengthening/mobility  - Encourage toileting schedule  Outcome: Progressing

## 2022-08-20 NOTE — CONSULTS
[facility]    PATIENT'S NAME: Yi Iyer   ATTENDING PHYSICIAN: Frederic Rodriguez MD   CONSULTING PHYSICIAN: Kofi Martinez DO   PATIENT ACCOUNT#:   [de-identified]    LOCATION:  [address] [place] [address] [address] RECORD #:   [id number]       YOB: 1945  ADMISSION DATE:       08/18/2022      CONSULT DATE:  08/19/2022    REPORT OF CONSULTATION    HISTORY OF PRESENT ILLNESS:  This is a 82-year-old female who has a history of cirrhosis, hypertension, diabetes who had recent acute pyelonephritis. Came in with lower extremity edema and shortness of breath. The patient was found to have congestive heart failure. The patient had cough. No abdominal pain, nausea, or vomiting. She did have some heaviness. High sensitivity troponin was negative. Heaviness is described as she cannot catch her breath. PAST MEDICAL HISTORY:  As above. MEDICATIONS:  Outpatient medication list reviewed. ALLERGIES:  Gabapentin. REVIEW OF SYSTEMS:  A 12-point review of systems is otherwise negative. PHYSICAL EXAMINATION:    GENERAL:  The patient is obese. VITAL SIGNS:  Blood pressure 146/61, pulse 69, the patient is afebrile. HEENT:  Atraumatic. No scleral icterus appreciated. Oral mucosa is moist.  NECK:  Neck veins are not clearly seen. LUNGS:  Crackles, bilateral bases. HEART:  S1 and S2 regular. No murmur is appreciated. ABDOMEN:  Soft, nontender. EXTREMITIES:  Bilateral 1+ edema. SKIN:  Without rashes, warm to touch. NEUROLOGIC:  Alert, appropriate, answers questions. PSYCHIATRIC:  Patient has calm affect and appropriate mood. MUSCULOSKELETAL:  Normal range of motion, normal strength. LABORATORY DATA:  Creatinine 1.25. No evidence for DVT bilaterally. Chest x-ray:  Bibasilar opacities. CT of the chest:  No pulmonary embolism, fluid overload noted. There is small bilateral pleural effusion. IMPRESSION:    1. Acute diastolic heart failure.   Echo with preserved left ventricular diastolic function. 2.   Recent acute pyelonephritis. RECOMMENDATIONS:  Continue with furosemide 40 mg IV daily, still appears to be mildly overloaded. Reviewing her antihypertensives, previously she was on lisinopril, but she has been off that medication. Blood pressure is elevated. We will start her isosorbide 30 mg daily due to her heart failure history to help reduce her filling pressures. Thank you for the consult.     Dictated By Blinda Bamberger, DO  d: 08/19/2022 14:36:54  t: 08/19/2022 15:26:52  [facility] [id number]  AS/

## 2022-08-21 LAB
ANION GAP SERPL CALC-SCNC: 7 MMOL/L (ref 0–18)
BASOPHILS # BLD AUTO: 0.07 X10(3) UL (ref 0–0.2)
BASOPHILS NFR BLD AUTO: 0.7 %
BUN BLD-MCNC: 14 MG/DL (ref 7–18)
BUN/CREAT SERPL: 13.7 (ref 10–20)
CALCIUM BLD-MCNC: 9.1 MG/DL (ref 8.5–10.1)
CHLORIDE SERPL-SCNC: 104 MMOL/L (ref 98–112)
CO2 SERPL-SCNC: 29 MMOL/L (ref 21–32)
CREAT BLD-MCNC: 1.02 MG/DL
DEPRECATED RDW RBC AUTO: 44.4 FL (ref 35.1–46.3)
EOSINOPHIL # BLD AUTO: 0.32 X10(3) UL (ref 0–0.7)
EOSINOPHIL NFR BLD AUTO: 3.4 %
ERYTHROCYTE [DISTWIDTH] IN BLOOD BY AUTOMATED COUNT: 14.4 % (ref 11–15)
ERYTHROCYTE [SEDIMENTATION RATE] IN BLOOD: 38 MM/HR
GFR SERPLBLD BASED ON 1.73 SQ M-ARVRAT: 57 ML/MIN/1.73M2 (ref 60–?)
GLUCOSE BLD-MCNC: 129 MG/DL (ref 70–99)
GLUCOSE BLDC GLUCOMTR-MCNC: 142 MG/DL (ref 70–99)
GLUCOSE BLDC GLUCOMTR-MCNC: 164 MG/DL (ref 70–99)
GLUCOSE BLDC GLUCOMTR-MCNC: 167 MG/DL (ref 70–99)
GLUCOSE BLDC GLUCOMTR-MCNC: 168 MG/DL (ref 70–99)
HCT VFR BLD AUTO: 32.3 %
HGB BLD-MCNC: 10.1 G/DL
IMM GRANULOCYTES # BLD AUTO: 0.06 X10(3) UL (ref 0–1)
IMM GRANULOCYTES NFR BLD: 0.6 %
LYMPHOCYTES # BLD AUTO: 3.7 X10(3) UL (ref 1–4)
LYMPHOCYTES NFR BLD AUTO: 39 %
MAGNESIUM SERPL-MCNC: 2 MG/DL (ref 1.6–2.6)
MCH RBC QN AUTO: 26.5 PG (ref 26–34)
MCHC RBC AUTO-ENTMCNC: 31.3 G/DL (ref 31–37)
MCV RBC AUTO: 84.8 FL
MONOCYTES # BLD AUTO: 0.86 X10(3) UL (ref 0.1–1)
MONOCYTES NFR BLD AUTO: 9.1 %
NEUTROPHILS # BLD AUTO: 4.47 X10 (3) UL (ref 1.5–7.7)
NEUTROPHILS # BLD AUTO: 4.47 X10(3) UL (ref 1.5–7.7)
NEUTROPHILS NFR BLD AUTO: 47.2 %
OSMOLALITY SERPL CALC.SUM OF ELEC: 292 MOSM/KG (ref 275–295)
PLATELET # BLD AUTO: 391 10(3)UL (ref 150–450)
POTASSIUM SERPL-SCNC: 4.1 MMOL/L (ref 3.5–5.1)
POTASSIUM SERPL-SCNC: 4.4 MMOL/L (ref 3.5–5.1)
RBC # BLD AUTO: 3.81 X10(6)UL
SODIUM SERPL-SCNC: 140 MMOL/L (ref 136–145)
WBC # BLD AUTO: 9.5 X10(3) UL (ref 4–11)

## 2022-08-21 PROCEDURE — 85025 COMPLETE CBC W/AUTO DIFF WBC: CPT | Performed by: HOSPITALIST

## 2022-08-21 PROCEDURE — 84132 ASSAY OF SERUM POTASSIUM: CPT | Performed by: HOSPITALIST

## 2022-08-21 PROCEDURE — 80048 BASIC METABOLIC PNL TOTAL CA: CPT | Performed by: HOSPITALIST

## 2022-08-21 PROCEDURE — 82962 GLUCOSE BLOOD TEST: CPT

## 2022-08-21 PROCEDURE — 96372 THER/PROPH/DIAG INJ SC/IM: CPT

## 2022-08-21 PROCEDURE — 96376 TX/PRO/DX INJ SAME DRUG ADON: CPT

## 2022-08-21 PROCEDURE — 83735 ASSAY OF MAGNESIUM: CPT | Performed by: HOSPITALIST

## 2022-08-21 PROCEDURE — 85652 RBC SED RATE AUTOMATED: CPT | Performed by: HOSPITALIST

## 2022-08-21 NOTE — PLAN OF CARE
Problem: Patient Centered Care  Goal: Patient preferences are identified and integrated in the patient's plan of care  Description: Interventions:  - What would you like us to know as we care for you? From home with sons  - Provide timely, complete, and accurate information to patient/family  - Incorporate patient and family knowledge, values, beliefs, and cultural backgrounds into the planning and delivery of care  - Encourage patient/family to participate in care and decision-making at the level they choose  - Honor patient and family perspectives and choices  Outcome: Progressing     Problem: PAIN - ADULT  Goal: Verbalizes/displays adequate comfort level or patient's stated pain goal  Description: INTERVENTIONS:  - Encourage pt to monitor pain and request assistance  - Assess pain using appropriate pain scale  - Administer analgesics based on type and severity of pain and evaluate response  - Implement non-pharmacological measures as appropriate and evaluate response  - Consider cultural and social influences on pain and pain management  - Manage/alleviate anxiety  - Utilize distraction and/or relaxation techniques  - Monitor for opioid side effects  - Notify MD/LIP if interventions unsuccessful or patient reports new pain  - Anticipate increased pain with activity and pre-medicate as appropriate  Outcome: Progressing     Problem: Patient/Family Goals  Goal: Patient/Family Long Term Goal  Description: Patient's Long Term Goal: to go home and stay home this time!     Interventions:  - Follow MD orders  -Monitor vitals, labs  -Administer meds  -Discharge planning  - See additional Care Plan goals for specific interventions  Outcome: Progressing  Goal: Patient/Family Short Term Goal  Description: Patient's Short Term Goal: to go home    Interventions:   - Discharge planning  -Administer meds  -Monitor vitals, labs  -Assist w/ ADL''s  - See additional Care Plan goals for specific interventions  Outcome: Progressing     Problem: SAFETY ADULT - FALL  Goal: Free from fall injury  Description: INTERVENTIONS:  - Assess pt frequently for physical needs  - Identify cognitive and physical deficits and behaviors that affect risk of falls.   - [place] fall precautions as indicated by assessment.  - Educate pt/family on patient safety including physical limitations  - Instruct pt to call for assistance with activity based on assessment  - Modify environment to reduce risk of injury  - Provide assistive devices as appropriate  - Consider OT/PT consult to assist with strengthening/mobility  - Encourage toileting schedule  Outcome: Progressing     Problem: METABOLIC/FLUID AND ELECTROLYTES - ADULT  Goal: Electrolytes maintained within normal limits  Description: INTERVENTIONS:  - Monitor labs and rhythm and assess patient for signs and symptoms of electrolyte imbalances  - Administer electrolyte replacement as ordered  - Monitor response to electrolyte replacements, including rhythm and repeat lab results as appropriate  - Fluid restriction as ordered  - Instruct patient on fluid and nutrition restrictions as appropriate  Outcome: Progressing  Goal: Glucose maintained within prescribed range  Description: INTERVENTIONS:  - Monitor Blood Glucose as ordered  - Assess for signs and symptoms of hyperglycemia and hypoglycemia  - Administer ordered medications to maintain glucose within target range  - Assess barriers to adequate nutritional intake and initiate nutrition consult as needed  - Instruct patient on self management of diabetes  Outcome: Progressing     Problem: SKIN/TISSUE INTEGRITY - ADULT  Goal: Skin integrity remains intact  Description: INTERVENTIONS  - Assess and document risk factors for pressure ulcer development  - Assess and document skin integrity  - Monitor for areas of redness and/or skin breakdown  - Initiate interventions, skin care algorithm/standards of care as needed  Outcome: Progressing     Problem: RISK FOR INFECTION - ADULT  Goal: Absence of fever/infection during anticipated neutropenic period  Description: INTERVENTIONS  - Monitor WBC, temp, culture  - Administer antibiotic as ordered  - Implement neutropenic guidelines  Outcome: Progressing   Repeat potassium 4.4, no acute changes, slept good at night.

## 2022-08-21 NOTE — PLAN OF CARE
Potassium and magnesium covered per protocol. Problem: Patient Centered Care  Goal: Patient preferences are identified and integrated in the patient's plan of care  Description: Interventions:  - What would you like us to know as we care for you? From home with sons  - Provide timely, complete, and accurate information to patient/family  - Incorporate patient and family knowledge, values, beliefs, and cultural backgrounds into the planning and delivery of care  - Encourage patient/family to participate in care and decision-making at the level they choose  - Honor patient and family perspectives and choices  8/20/2022 2000 by Dann Jarrett RN  Outcome: Progressing  8/20/2022 1812 by Dann Jarrett RN  Outcome: Progressing     Problem: PAIN - ADULT  Goal: Verbalizes/displays adequate comfort level or patient's stated pain goal  Description: INTERVENTIONS:  - Encourage pt to monitor pain and request assistance  - Assess pain using appropriate pain scale  - Administer analgesics based on type and severity of pain and evaluate response  - Implement non-pharmacological measures as appropriate and evaluate response  - Consider cultural and social influences on pain and pain management  - Manage/alleviate anxiety  - Utilize distraction and/or relaxation techniques  - Monitor for opioid side effects  - Notify MD/LIP if interventions unsuccessful or patient reports new pain  - Anticipate increased pain with activity and pre-medicate as appropriate  Outcome: Progressing     Problem: Patient/Family Goals  Goal: Patient/Family Long Term Goal  Description: Patient's Long Term Goal: to go home and stay home this time!     Interventions:  -   - See additional Care Plan goals for specific interventions  Outcome: Progressing  Goal: Patient/Family Short Term Goal  Description: Patient's Short Term Goal: to go home    Interventions:   -   - See additional Care Plan goals for specific interventions  Outcome: Progressing Problem: SAFETY ADULT - FALL  Goal: Free from fall injury  Description: INTERVENTIONS:  - Assess pt frequently for physical needs  - Identify cognitive and physical deficits and behaviors that affect risk of falls.   - [place] fall precautions as indicated by assessment.  - Educate pt/family on patient safety including physical limitations  - Instruct pt to call for assistance with activity based on assessment  - Modify environment to reduce risk of injury  - Provide assistive devices as appropriate  - Consider OT/PT consult to assist with strengthening/mobility  - Encourage toileting schedule  Outcome: Progressing

## 2022-08-22 ENCOUNTER — APPOINTMENT (OUTPATIENT)
Dept: MRI IMAGING | Facility: HOSPITAL | Age: 77
End: 2022-08-22
Attending: HOSPITALIST
Payer: MEDICARE

## 2022-08-22 VITALS
RESPIRATION RATE: 20 BRPM | BODY MASS INDEX: 33.1 KG/M2 | TEMPERATURE: 98 F | DIASTOLIC BLOOD PRESSURE: 46 MMHG | OXYGEN SATURATION: 95 % | HEART RATE: 84 BPM | HEIGHT: 58 IN | SYSTOLIC BLOOD PRESSURE: 122 MMHG | WEIGHT: 157.69 LBS

## 2022-08-22 LAB
ANION GAP SERPL CALC-SCNC: 9 MMOL/L (ref 0–18)
BASOPHILS # BLD AUTO: 0.06 X10(3) UL (ref 0–0.2)
BASOPHILS NFR BLD AUTO: 0.7 %
BUN BLD-MCNC: 22 MG/DL (ref 7–18)
BUN/CREAT SERPL: 18.6 (ref 10–20)
CALCIUM BLD-MCNC: 9.6 MG/DL (ref 8.5–10.1)
CHLORIDE SERPL-SCNC: 101 MMOL/L (ref 98–112)
CO2 SERPL-SCNC: 29 MMOL/L (ref 21–32)
CREAT BLD-MCNC: 1.18 MG/DL
DEPRECATED RDW RBC AUTO: 45.8 FL (ref 35.1–46.3)
EOSINOPHIL # BLD AUTO: 0.46 X10(3) UL (ref 0–0.7)
EOSINOPHIL NFR BLD AUTO: 5.7 %
ERYTHROCYTE [DISTWIDTH] IN BLOOD BY AUTOMATED COUNT: 14.5 % (ref 11–15)
GFR SERPLBLD BASED ON 1.73 SQ M-ARVRAT: 48 ML/MIN/1.73M2 (ref 60–?)
GLUCOSE BLD-MCNC: 148 MG/DL (ref 70–99)
GLUCOSE BLDC GLUCOMTR-MCNC: 161 MG/DL (ref 70–99)
HCT VFR BLD AUTO: 35.7 %
HGB BLD-MCNC: 10.7 G/DL
IMM GRANULOCYTES # BLD AUTO: 0.08 X10(3) UL (ref 0–1)
IMM GRANULOCYTES NFR BLD: 1 %
LYMPHOCYTES # BLD AUTO: 3.13 X10(3) UL (ref 1–4)
LYMPHOCYTES NFR BLD AUTO: 39 %
MAGNESIUM SERPL-MCNC: 1.9 MG/DL (ref 1.6–2.6)
MCH RBC QN AUTO: 26.1 PG (ref 26–34)
MCHC RBC AUTO-ENTMCNC: 30 G/DL (ref 31–37)
MCV RBC AUTO: 87.1 FL
MONOCYTES # BLD AUTO: 0.7 X10(3) UL (ref 0.1–1)
MONOCYTES NFR BLD AUTO: 8.7 %
NEUTROPHILS # BLD AUTO: 3.6 X10 (3) UL (ref 1.5–7.7)
NEUTROPHILS # BLD AUTO: 3.6 X10(3) UL (ref 1.5–7.7)
NEUTROPHILS NFR BLD AUTO: 44.9 %
OSMOLALITY SERPL CALC.SUM OF ELEC: 294 MOSM/KG (ref 275–295)
PHOSPHATE SERPL-MCNC: 3.5 MG/DL (ref 2.5–4.9)
PLATELET # BLD AUTO: 501 10(3)UL (ref 150–450)
POTASSIUM SERPL-SCNC: 3.9 MMOL/L (ref 3.5–5.1)
RBC # BLD AUTO: 4.1 X10(6)UL
SODIUM SERPL-SCNC: 139 MMOL/L (ref 136–145)
WBC # BLD AUTO: 8 X10(3) UL (ref 4–11)

## 2022-08-22 PROCEDURE — 96376 TX/PRO/DX INJ SAME DRUG ADON: CPT

## 2022-08-22 PROCEDURE — 85025 COMPLETE CBC W/AUTO DIFF WBC: CPT | Performed by: HOSPITALIST

## 2022-08-22 PROCEDURE — 84100 ASSAY OF PHOSPHORUS: CPT | Performed by: HOSPITALIST

## 2022-08-22 PROCEDURE — 80048 BASIC METABOLIC PNL TOTAL CA: CPT | Performed by: HOSPITALIST

## 2022-08-22 PROCEDURE — 70551 MRI BRAIN STEM W/O DYE: CPT | Performed by: HOSPITALIST

## 2022-08-22 PROCEDURE — 83735 ASSAY OF MAGNESIUM: CPT | Performed by: HOSPITALIST

## 2022-08-22 PROCEDURE — 82962 GLUCOSE BLOOD TEST: CPT

## 2022-08-22 PROCEDURE — 96372 THER/PROPH/DIAG INJ SC/IM: CPT

## 2022-08-22 RX ORDER — LISINOPRIL 5 MG/1
5 TABLET ORAL DAILY
Qty: 30 TABLET | Refills: 0 | Status: SHIPPED | OUTPATIENT
Start: 2022-08-23

## 2022-08-22 RX ORDER — CYCLOBENZAPRINE HCL 5 MG
5 TABLET ORAL 3 TIMES DAILY PRN
Qty: 12 TABLET | Refills: 0 | Status: SHIPPED | OUTPATIENT
Start: 2022-08-22

## 2022-08-22 RX ORDER — CEPHALEXIN 500 MG/1
500 CAPSULE ORAL 3 TIMES DAILY
Qty: 6 CAPSULE | Refills: 0 | Status: SHIPPED | OUTPATIENT
Start: 2022-08-22 | End: 2022-08-24

## 2022-08-22 RX ORDER — PANTOPRAZOLE SODIUM 40 MG/1
40 TABLET, DELAYED RELEASE ORAL
Qty: 30 TABLET | Refills: 0 | Status: SHIPPED | OUTPATIENT
Start: 2022-08-23

## 2022-08-22 RX ORDER — SPIRONOLACTONE 25 MG/1
25 TABLET ORAL DAILY
Qty: 30 TABLET | Refills: 0 | Status: SHIPPED | OUTPATIENT
Start: 2022-08-23

## 2022-08-22 RX ORDER — ISOSORBIDE MONONITRATE 30 MG/1
30 TABLET, EXTENDED RELEASE ORAL DAILY
Qty: 30 TABLET | Refills: 0 | Status: SHIPPED | OUTPATIENT
Start: 2022-08-23

## 2022-08-22 RX ORDER — FUROSEMIDE 40 MG/1
40 TABLET ORAL DAILY
Qty: 30 TABLET | Refills: 0 | Status: SHIPPED | OUTPATIENT
Start: 2022-08-23

## 2022-08-22 NOTE — PLAN OF CARE
Problem: Patient Centered Care  Goal: Patient preferences are identified and integrated in the patient's plan of care  Description: Interventions:  - What would you like us to know as we care for you? From home with sons  - Provide timely, complete, and accurate information to patient/family  - Incorporate patient and family knowledge, values, beliefs, and cultural backgrounds into the planning and delivery of care  - Encourage patient/family to participate in care and decision-making at the level they choose  - Honor patient and family perspectives and choices  Outcome: Progressing     Problem: PAIN - ADULT  Goal: Verbalizes/displays adequate comfort level or patient's stated pain goal  Description: INTERVENTIONS:  - Encourage pt to monitor pain and request assistance  - Assess pain using appropriate pain scale  - Administer analgesics based on type and severity of pain and evaluate response  - Implement non-pharmacological measures as appropriate and evaluate response  - Consider cultural and social influences on pain and pain management  - Manage/alleviate anxiety  - Utilize distraction and/or relaxation techniques  - Monitor for opioid side effects  - Notify MD/LIP if interventions unsuccessful or patient reports new pain  - Anticipate increased pain with activity and pre-medicate as appropriate  Outcome: Progressing     Problem: Patient/Family Goals  Goal: Patient/Family Long Term Goal  Description: Patient's Long Term Goal: to go home and stay home this time!     Interventions:  -   - See additional Care Plan goals for specific interventions  Outcome: Progressing  Goal: Patient/Family Short Term Goal  Description: Patient's Short Term Goal: to go home    Interventions:   -   - See additional Care Plan goals for specific interventions  Outcome: Progressing     Problem: SAFETY ADULT - FALL  Goal: Free from fall injury  Description: INTERVENTIONS:  - Assess pt frequently for physical needs  - Identify cognitive and physical deficits and behaviors that affect risk of falls.   - [place] fall precautions as indicated by assessment.  - Educate pt/family on patient safety including physical limitations  - Instruct pt to call for assistance with activity based on assessment  - Modify environment to reduce risk of injury  - Provide assistive devices as appropriate  - Consider OT/PT consult to assist with strengthening/mobility  - Encourage toileting schedule  Outcome: Progressing     Problem: METABOLIC/FLUID AND ELECTROLYTES - ADULT  Goal: Electrolytes maintained within normal limits  Description: INTERVENTIONS:  - Monitor labs and rhythm and assess patient for signs and symptoms of electrolyte imbalances  - Administer electrolyte replacement as ordered  - Monitor response to electrolyte replacements, including rhythm and repeat lab results as appropriate  - Fluid restriction as ordered  - Instruct patient on fluid and nutrition restrictions as appropriate  Outcome: Progressing  Goal: Glucose maintained within prescribed range  Description: INTERVENTIONS:  - Monitor Blood Glucose as ordered  - Assess for signs and symptoms of hyperglycemia and hypoglycemia  - Administer ordered medications to maintain glucose within target range  - Assess barriers to adequate nutritional intake and initiate nutrition consult as needed  - Instruct patient on self management of diabetes  Outcome: Progressing     Problem: SKIN/TISSUE INTEGRITY - ADULT  Goal: Skin integrity remains intact  Description: INTERVENTIONS  - Assess and document risk factors for pressure ulcer development  - Assess and document skin integrity  - Monitor for areas of redness and/or skin breakdown  - Initiate interventions, skin care algorithm/standards of care as needed  Outcome: Progressing     Problem: RISK FOR INFECTION - ADULT  Goal: Absence of fever/infection during anticipated neutropenic period  Description: INTERVENTIONS  - Monitor WBC  - Administer growth factors as ordered  - Implement neutropenic guidelines  Outcome: Progressing

## 2022-08-22 NOTE — PLAN OF CARE
Problem: Patient Centered Care  Goal: Patient preferences are identified and integrated in the patient's plan of care  Description: Interventions:  - What would you like us to know as we care for you? From home with sons  - Provide timely, complete, and accurate information to patient/family  - Incorporate patient and family knowledge, values, beliefs, and cultural backgrounds into the planning and delivery of care  - Encourage patient/family to participate in care and decision-making at the level they choose  - Honor patient and family perspectives and choices  Outcome: Progressing     Problem: PAIN - ADULT  Goal: Verbalizes/displays adequate comfort level or patient's stated pain goal  Description: INTERVENTIONS:  - Encourage pt to monitor pain and request assistance  - Assess pain using appropriate pain scale  - Administer analgesics based on type and severity of pain and evaluate response  - Implement non-pharmacological measures as appropriate and evaluate response  - Consider cultural and social influences on pain and pain management  - Manage/alleviate anxiety  - Utilize distraction and/or relaxation techniques  - Monitor for opioid side effects  - Notify MD/LIP if interventions unsuccessful or patient reports new pain  - Anticipate increased pain with activity and pre-medicate as appropriate  Outcome: Progressing     Problem: Patient/Family Goals  Goal: Patient/Family Long Term Goal  Description: Patient's Long Term Goal: to go home and stay home this time!     Interventions:  - Follow MD orders  -Monitor vitals, labs  -Administer meds  -Pain medication  -Discharge planning  - See additional Care Plan goals for specific interventions  Outcome: Progressing  Goal: Patient/Family Short Term Goal  Description: Patient's Short Term Goal: to go home    Interventions:   - Follow MD orders  -Monitor vitals, labs  -Administer meds  -Pain medication  -Discharge planning  - See additional Care Plan goals for specific interventions  Outcome: Progressing     Problem: SAFETY ADULT - FALL  Goal: Free from fall injury  Description: INTERVENTIONS:  - Assess pt frequently for physical needs  - Identify cognitive and physical deficits and behaviors that affect risk of falls.   - [place] fall precautions as indicated by assessment.  - Educate pt/family on patient safety including physical limitations  - Instruct pt to call for assistance with activity based on assessment  - Modify environment to reduce risk of injury  - Provide assistive devices as appropriate  - Consider OT/PT consult to assist with strengthening/mobility  - Encourage toileting schedule  Outcome: Progressing     Problem: METABOLIC/FLUID AND ELECTROLYTES - ADULT  Goal: Electrolytes maintained within normal limits  Description: INTERVENTIONS:  - Monitor labs and rhythm and assess patient for signs and symptoms of electrolyte imbalances  - Administer electrolyte replacement as ordered  - Monitor response to electrolyte replacements, including rhythm and repeat lab results as appropriate  - Fluid restriction as ordered  - Instruct patient on fluid and nutrition restrictions as appropriate  Outcome: Progressing  Goal: Glucose maintained within prescribed range  Description: INTERVENTIONS:  - Monitor Blood Glucose as ordered  - Assess for signs and symptoms of hyperglycemia and hypoglycemia  - Administer ordered medications to maintain glucose within target range  - Assess barriers to adequate nutritional intake and initiate nutrition consult as needed  - Instruct patient on self management of diabetes  Outcome: Progressing     Problem: SKIN/TISSUE INTEGRITY - ADULT  Goal: Skin integrity remains intact  Description: INTERVENTIONS  - Assess and document risk factors for pressure ulcer development  - Assess and document skin integrity  - Monitor for areas of redness and/or skin breakdown  - Initiate interventions, skin care algorithm/standards of care as needed  Outcome: Progressing     Problem: RISK FOR INFECTION - ADULT  Goal: Absence of fever/infection during anticipated neutropenic period  Description: INTERVENTIONS  - Monitor WBC, temp, culture  - Administer antibiotc as ordered  - Implement neutropenic guidelines  Outcome: Progressing   Medicated for headache, for brain MRI today.

## 2022-08-22 NOTE — DISCHARGE SUMMARY
Dc summary#[id number]  > 30 min spent on [address] Discharge Diagnoses: chf    Lace+ Score: 80  59-90 High Risk  29-58 Medium Risk  0-28   Low Risk. TCM Follow-Up Recommendation:  LACE > 58:  High Risk of readmission after discharge from the hospital.tcm fu recommended

## 2022-08-22 NOTE — PLAN OF CARE
Discharge RN Summary: Patient has discharge order in. Patient to discharge home. IV removed by this RN. Understands to follow up with PCP in 1 week. Patient understands to  8 medications w/ printed prescription. Scripts sent with pt: 8 prescriptions given to patient/son  Electronically sent prescriptions: none      Problem: Patient Centered Care  Goal: Patient preferences are identified and integrated in the patient's plan of care  Description: Interventions:  - What would you like us to know as we care for you? From home with sons  - Provide timely, complete, and accurate information to patient/family  - Incorporate patient and family knowledge, values, beliefs, and cultural backgrounds into the planning and delivery of care  - Encourage patient/family to participate in care and decision-making at the level they choose  - Honor patient and family perspectives and choices  Outcome: Progressing     Problem: PAIN - ADULT  Goal: Verbalizes/displays adequate comfort level or patient's stated pain goal  Description: INTERVENTIONS:  - Encourage pt to monitor pain and request assistance  - Assess pain using appropriate pain scale  - Administer analgesics based on type and severity of pain and evaluate response  - Implement non-pharmacological measures as appropriate and evaluate response  - Consider cultural and social influences on pain and pain management  - Manage/alleviate anxiety  - Utilize distraction and/or relaxation techniques  - Monitor for opioid side effects  - Notify MD/LIP if interventions unsuccessful or patient reports new pain  - Anticipate increased pain with activity and pre-medicate as appropriate  Outcome: Progressing     Problem: Patient/Family Goals  Goal: Patient/Family Long Term Goal  Description: Patient's Long Term Goal: to go home and stay home this time!     Interventions:  -ambulation test  -cardiology consult  -monitor O2  -monitor pain  - See additional Care Plan goals for specific interventions  Outcome: Progressing  Goal: Patient/Family Short Term Goal  Description: Patient's Short Term Goal: to go home    Interventions:   - cardiology consult  -iv abx  -monitor pain  -monitor VS  - See additional Care Plan goals for specific interventions  Outcome: Progressing     Problem: SAFETY ADULT - FALL  Goal: Free from fall injury  Description: INTERVENTIONS:  - Assess pt frequently for physical needs  - Identify cognitive and physical deficits and behaviors that affect risk of falls.   - [place] fall precautions as indicated by assessment.  - Educate pt/family on patient safety including physical limitations  - Instruct pt to call for assistance with activity based on assessment  - Modify environment to reduce risk of injury  - Provide assistive devices as appropriate  - Consider OT/PT consult to assist with strengthening/mobility  - Encourage toileting schedule  Outcome: Progressing     Problem: METABOLIC/FLUID AND ELECTROLYTES - ADULT  Goal: Electrolytes maintained within normal limits  Description: INTERVENTIONS:  - Monitor labs and rhythm and assess patient for signs and symptoms of electrolyte imbalances  - Administer electrolyte replacement as ordered  - Monitor response to electrolyte replacements, including rhythm and repeat lab results as appropriate  - Fluid restriction as ordered  - Instruct patient on fluid and nutrition restrictions as appropriate  Outcome: Progressing  Goal: Glucose maintained within prescribed range  Description: INTERVENTIONS:  - Monitor Blood Glucose as ordered  - Assess for signs and symptoms of hyperglycemia and hypoglycemia  - Administer ordered medications to maintain glucose within target range  - Assess barriers to adequate nutritional intake and initiate nutrition consult as needed  - Instruct patient on self management of diabetes  Outcome: Progressing     Problem: SKIN/TISSUE INTEGRITY - ADULT  Goal: Skin integrity remains intact  Description: INTERVENTIONS  - Assess and document risk factors for pressure ulcer development  - Assess and document skin integrity  - Monitor for areas of redness and/or skin breakdown  - Initiate interventions, skin care algorithm/standards of care as needed  Outcome: Progressing     Problem: RISK FOR INFECTION - ADULT  Goal: Absence of fever/infection during anticipated neutropenic period  Description: INTERVENTIONS  - Monitor WBC  - Administer growth factors as ordered  - Implement neutropenic guidelines  Outcome: Progressing

## 2022-08-23 ENCOUNTER — PATIENT OUTREACH (OUTPATIENT)
Dept: CASE MANAGEMENT | Age: 77
End: 2022-08-23

## 2022-08-23 DIAGNOSIS — I10 ESSENTIAL HYPERTENSION: ICD-10-CM

## 2022-08-23 DIAGNOSIS — Z02.9 ENCOUNTERS FOR ADMINISTRATIVE PURPOSE: ICD-10-CM

## 2022-08-23 DIAGNOSIS — N17.9 AKI (ACUTE KIDNEY INJURY) (HCC): ICD-10-CM

## 2022-08-23 DIAGNOSIS — N18.31 STAGE 3A CHRONIC KIDNEY DISEASE (HCC): ICD-10-CM

## 2022-08-23 DIAGNOSIS — E11.3293 TYPE 2 DIABETES MELLITUS WITH BOTH EYES AFFECTED BY MILD NONPROLIFERATIVE RETINOPATHY WITHOUT MACULAR EDEMA, WITHOUT LONG-TERM CURRENT USE OF INSULIN (HCC): ICD-10-CM

## 2022-08-23 DIAGNOSIS — K74.60 CIRRHOSIS OF LIVER WITHOUT ASCITES, UNSPECIFIED HEPATIC CIRRHOSIS TYPE (HCC): Primary | ICD-10-CM

## 2022-08-23 PROCEDURE — 1111F DSCHRG MED/CURRENT MED MERGE: CPT

## 2022-08-23 NOTE — PROGRESS NOTES
Attempted to reach the patient to complete a [facility]-Hospital FU call. The phone rang several times without an answer. NCM unable to leave a message. The patient's voicemail box is full. NCM will try again later.

## 2022-08-24 ENCOUNTER — TELEPHONE (OUTPATIENT)
Dept: INTERNAL MEDICINE CLINIC | Facility: CLINIC | Age: 77
End: 2022-08-24

## 2022-08-24 NOTE — DISCHARGE SUMMARY
[facility]    PATIENT'S NAME: Kieran Dean   ATTENDING PHYSICIAN: Frederic Rodriguez MD   PATIENT ACCOUNT#:   [id number]    LOCATION:  [facility] RECORD #:   [id number]       YOB: 1945  ADMISSION DATE:       08/18/2022      DISCHARGE DATE:  08/22/2022    DISCHARGE SUMMARY    About 1 hour was spent preparing this discharge. DISCHARGE DIAGNOSIS:  Congestive heart failure complicated by some degree of anxiety. HISTORY AND HOSPITAL COURSE:  This is a very pleasant 28-year-old  American female who appears much younger than her stated age who presents with a history of having just been discharged from the hospital after treatment for pyelonephritis. The patient had been doing well and stated she was well, but when she returned to the hospital; she stated she has been short of breath ever since she left. She was found to have fluid overload on the CAT scan done to rule out a pulmonary embolism. She was having shortness of breath and palpitations. She also complained of headache. MRI of her head revealed no alarming findings. She also had complained of abdominal pain in the past which revealed her pyelonephritis. This seems to be much improved. The patient was seen by Cardiology and was placed on diuresis and did well. I walked her around 300 feet and her heart rate did not jump above the 80s. Her O2 saturation did not drop below 96%. She was cleared by Cardiology to go home and I concurred. Cardiology caveat to clear her was a negative MRI of the brain and the MRI of the brain did result as negative with no acute pathology, but did have chronic microvascular ischemic changes, perhaps related to age. PHYSICAL EXAMINATION:    VITAL SIGNS:  On discharge temperature 98.4, pulse 84, respiratory rate 20, 122/46, 95%. LUNGS:  Clear. HEART:  Normal S1, S2. No S3.  ABDOMEN:  Soft. EXTREMITIES:  Without calf tenderness.   NEUROLOGIC:  She is alert, oriented, friendly, and cooperative. LABORATORY STUDIES:  Please see chart. ASSESSMENT AND PLAN:    1. Acute on chronic diastolic congestive heart failure. The patient doing well. Will continue diuretics. 2.   Jaw discomfort with tachycardia. Now seems to be resolved. 3.   Acute pyelonephritis. Continue Keflex for 2 more days and then should be finished. 4.   Type 2 diabetes. 5.   Headache with intermittent blurred vision. The patient's workup was negative. MRI is negative. 6.   Hyperlipidemia. 7.   Code status is Full Code. Discussed with the patient during this hospitalization. 8.   Elevated sedimentation rate. No evidence of temporal arteritis. Probably related to residual infection. Follow up with Dr. Kaylen Bryant. 9.   Obesity, body mass index 32.96. May need NAVEEN workup. CONDITION ON DISCHARGE:  Stable. CODE STATUS:  Full Code. DISCHARGE MEDICATIONS:    1. Tylenol 500 mg every 6 hours as needed for pain. 2.   Atorvastatin 10 mg every night. 3.   Metformin 1000 mg daily with breakfast.  Watch for nausea and vomiting. 4.   MiraLAX 17 g daily. 5.   Sitagliptin 100 mg daily. 6.   Travatan 0.004% 1 drop each eye at bedtime. 7.   Cyclobenzaprine 5 mg 3 times a day as needed. 8.   Lasix 40 mg daily. 9.   Isordil ER 30 mg daily. 10.   Lisinopril 5 mg daily. 11.   Metoprolol 25 mg twice a day. 12.   Protonix 40 mg every morning. 13.   Spironolactone 25 mg daily. 14.   Keflex 500 mg 3 times a day for 6 doses. Stop if rash. DIET:  Low fat, low salt, 2000-calorie ADA. ACTIVITY:  No heavy exercise, otherwise as tolerated. FOLLOWUP:  Dr. Bethany Pink in 1 week and Dr. Kaylen Bryant in 3 days for workup of elevated sedimentation rate and other issues. RISK OF READMISSION:  High. TCM followup is definitely recommended. Dictated By Yumiko Locke.  MD Jennifer  d: 08/22/2022 16:54:57  t: 08/23/2022 14:13:49  Job [id number]  [facility]/

## 2022-08-24 NOTE — TELEPHONE ENCOUNTER
Called and spoke with patient. Dr. Carmela Capone does not have anything available until her scheduled appt on 9/6. Patient placed on a wait list in case of cancellation.

## 2022-08-24 NOTE — TELEPHONE ENCOUNTER
Spoke to pt for TCM today. Pt has an appt on 9/6/22 but is high risk for readmission and this is past the timeframe. TCM/HFU appt recommended by 8/29/2022 as pt is a high risk for readmission. Please follow up with patient as she is expecting a return call to schedule a sooner appt.  Thank you    BOOK BY DATE (last date for TCM): 9/5/22

## 2022-09-06 ENCOUNTER — OFFICE VISIT (OUTPATIENT)
Dept: INTERNAL MEDICINE CLINIC | Facility: CLINIC | Age: 77
End: 2022-09-06
Payer: COMMERCIAL

## 2022-09-06 VITALS
OXYGEN SATURATION: 99 % | DIASTOLIC BLOOD PRESSURE: 74 MMHG | HEIGHT: 58 IN | SYSTOLIC BLOOD PRESSURE: 138 MMHG | BODY MASS INDEX: 32.07 KG/M2 | TEMPERATURE: 97 F | HEART RATE: 65 BPM | WEIGHT: 152.81 LBS

## 2022-09-06 DIAGNOSIS — M15.9 PRIMARY OSTEOARTHRITIS INVOLVING MULTIPLE JOINTS: ICD-10-CM

## 2022-09-06 DIAGNOSIS — E78.5 HYPERLIPIDEMIA, UNSPECIFIED HYPERLIPIDEMIA TYPE: ICD-10-CM

## 2022-09-06 DIAGNOSIS — F41.9 ANXIETY: ICD-10-CM

## 2022-09-06 DIAGNOSIS — N12 PYELONEPHRITIS: ICD-10-CM

## 2022-09-06 DIAGNOSIS — H40.9 GLAUCOMA OF BOTH EYES, UNSPECIFIED GLAUCOMA TYPE: ICD-10-CM

## 2022-09-06 DIAGNOSIS — F32.1 CURRENT MODERATE EPISODE OF MAJOR DEPRESSIVE DISORDER WITHOUT PRIOR EPISODE (HCC): ICD-10-CM

## 2022-09-06 DIAGNOSIS — N13.30 HYDRONEPHROSIS, UNSPECIFIED HYDRONEPHROSIS TYPE: ICD-10-CM

## 2022-09-06 DIAGNOSIS — E11.3293 TYPE 2 DIABETES MELLITUS WITH BOTH EYES AFFECTED BY MILD NONPROLIFERATIVE RETINOPATHY WITHOUT MACULAR EDEMA, WITHOUT LONG-TERM CURRENT USE OF INSULIN (HCC): ICD-10-CM

## 2022-09-06 DIAGNOSIS — I50.9 HEART FAILURE, UNSPECIFIED HF CHRONICITY, UNSPECIFIED HEART FAILURE TYPE (HCC): ICD-10-CM

## 2022-09-06 DIAGNOSIS — M85.80 OSTEOPENIA, UNSPECIFIED LOCATION: ICD-10-CM

## 2022-09-06 DIAGNOSIS — K74.60 CIRRHOSIS OF LIVER WITHOUT ASCITES, UNSPECIFIED HEPATIC CIRRHOSIS TYPE (HCC): ICD-10-CM

## 2022-09-06 DIAGNOSIS — I10 ESSENTIAL HYPERTENSION: Primary | ICD-10-CM

## 2022-09-06 DIAGNOSIS — N18.31 STAGE 3A CHRONIC KIDNEY DISEASE (HCC): Chronic | ICD-10-CM

## 2022-09-06 DIAGNOSIS — E66.9 CLASS 1 OBESITY WITH SERIOUS COMORBIDITY AND BODY MASS INDEX (BMI) OF 31.0 TO 31.9 IN ADULT, UNSPECIFIED OBESITY TYPE: ICD-10-CM

## 2022-09-06 PROBLEM — E66.01 MORBID (SEVERE) OBESITY DUE TO EXCESS CALORIES (HCC): Status: RESOLVED | Noted: 2022-02-04 | Resolved: 2022-09-06

## 2022-09-06 PROBLEM — K57.30 DIVERTICULOSIS OF COLON: Status: RESOLVED | Noted: 2019-08-19 | Resolved: 2022-09-06

## 2022-09-06 PROBLEM — N17.9 AKI (ACUTE KIDNEY INJURY) (HCC): Status: RESOLVED | Noted: 2022-06-13 | Resolved: 2022-09-06

## 2022-09-06 PROBLEM — Z87.19 HISTORY OF RECTAL BLEEDING: Status: RESOLVED | Noted: 2020-07-31 | Resolved: 2022-09-06

## 2022-09-06 PROBLEM — N17.9 AKI (ACUTE KIDNEY INJURY): Status: RESOLVED | Noted: 2022-06-13 | Resolved: 2022-09-06

## 2022-09-06 PROBLEM — E66.811 CLASS 1 OBESITY WITH SERIOUS COMORBIDITY AND BODY MASS INDEX (BMI) OF 31.0 TO 31.9 IN ADULT: Status: ACTIVE | Noted: 2019-08-19

## 2022-09-06 PROBLEM — K92.2 ACUTE LOWER GASTROINTESTINAL BLEEDING: Status: RESOLVED | Noted: 2020-07-19 | Resolved: 2022-09-06

## 2022-09-06 PROCEDURE — 3078F DIAST BP <80 MM HG: CPT | Performed by: FAMILY MEDICINE

## 2022-09-06 PROCEDURE — 99215 OFFICE O/P EST HI 40 MIN: CPT | Performed by: FAMILY MEDICINE

## 2022-09-06 PROCEDURE — 3008F BODY MASS INDEX DOCD: CPT | Performed by: FAMILY MEDICINE

## 2022-09-06 PROCEDURE — 3075F SYST BP GE 130 - 139MM HG: CPT | Performed by: FAMILY MEDICINE

## 2022-09-06 RX ORDER — LISINOPRIL 5 MG/1
5 TABLET ORAL DAILY
Qty: 90 TABLET | Refills: 3 | Status: SHIPPED | OUTPATIENT
Start: 2022-09-06

## 2022-09-06 RX ORDER — ATORVASTATIN CALCIUM 10 MG/1
10 TABLET, FILM COATED ORAL NIGHTLY
Qty: 90 TABLET | Refills: 3 | Status: SHIPPED | OUTPATIENT
Start: 2022-09-06

## 2022-09-06 NOTE — ASSESSMENT & PLAN NOTE
Hypertension well-controlled at this time. Continue lisinopril 5 mg daily, metoprolol tartrate 25 mg twice daily, spironolactone 25 mg daily.

## 2022-09-06 NOTE — ASSESSMENT & PLAN NOTE
Diabetes most recently well controlled. For now continue metformin 1000 mg in the morning and 500 mg at night. Continue sitagliptin 100 mg daily. Reminded to see ophthalmology this fall.

## 2022-09-06 NOTE — ASSESSMENT & PLAN NOTE
Patient completed course of antibiotics. She continues to follow with urology to further evaluate the ureteral obstruction.

## 2022-09-06 NOTE — ASSESSMENT & PLAN NOTE
Patient with abnormal appearing liver seen on imaging. Currently following with [facility] that she should follow-up with them closely.

## 2022-09-22 ENCOUNTER — HOSPITAL ENCOUNTER (OUTPATIENT)
Dept: BONE DENSITY | Age: 77
Discharge: HOME | End: 2022-09-22
Attending: FAMILY MEDICINE

## 2022-09-22 DIAGNOSIS — M85.80 OSTEOPENIA, UNSPECIFIED LOCATION: ICD-10-CM

## 2022-09-22 PROCEDURE — 77080 DXA BONE DENSITY AXIAL: CPT | Performed by: FAMILY MEDICINE

## 2022-10-06 ENCOUNTER — OFFICE VISIT (OUTPATIENT)
Dept: INTERNAL MEDICINE CLINIC | Facility: CLINIC | Age: 77
End: 2022-10-06
Payer: COMMERCIAL

## 2022-10-06 VITALS
WEIGHT: 156 LBS | SYSTOLIC BLOOD PRESSURE: 130 MMHG | DIASTOLIC BLOOD PRESSURE: 76 MMHG | OXYGEN SATURATION: 97 % | HEART RATE: 84 BPM | BODY MASS INDEX: 32.75 KG/M2 | HEIGHT: 58 IN | RESPIRATION RATE: 17 BRPM

## 2022-10-06 DIAGNOSIS — E78.5 HYPERLIPIDEMIA, UNSPECIFIED HYPERLIPIDEMIA TYPE: ICD-10-CM

## 2022-10-06 DIAGNOSIS — K74.60 CIRRHOSIS OF LIVER WITHOUT ASCITES, UNSPECIFIED HEPATIC CIRRHOSIS TYPE (HCC): ICD-10-CM

## 2022-10-06 DIAGNOSIS — N12 PYELONEPHRITIS: ICD-10-CM

## 2022-10-06 DIAGNOSIS — F32.1 CURRENT MODERATE EPISODE OF MAJOR DEPRESSIVE DISORDER WITHOUT PRIOR EPISODE (HCC): ICD-10-CM

## 2022-10-06 DIAGNOSIS — M15.9 PRIMARY OSTEOARTHRITIS INVOLVING MULTIPLE JOINTS: Primary | ICD-10-CM

## 2022-10-06 DIAGNOSIS — M85.80 OSTEOPENIA, UNSPECIFIED LOCATION: ICD-10-CM

## 2022-10-06 DIAGNOSIS — N18.31 STAGE 3A CHRONIC KIDNEY DISEASE (HCC): Chronic | ICD-10-CM

## 2022-10-06 DIAGNOSIS — Z00.00 HEALTH MAINTENANCE EXAMINATION: ICD-10-CM

## 2022-10-06 DIAGNOSIS — I10 ESSENTIAL HYPERTENSION: ICD-10-CM

## 2022-10-06 DIAGNOSIS — I50.9 HEART FAILURE, UNSPECIFIED HF CHRONICITY, UNSPECIFIED HEART FAILURE TYPE (HCC): ICD-10-CM

## 2022-10-06 DIAGNOSIS — N13.30 HYDRONEPHROSIS, UNSPECIFIED HYDRONEPHROSIS TYPE: ICD-10-CM

## 2022-10-06 PROBLEM — R06.02 SHORTNESS OF BREATH: Status: RESOLVED | Noted: 2022-08-18 | Resolved: 2022-10-06

## 2022-10-06 PROBLEM — F41.9 ANXIETY: Status: RESOLVED | Noted: 2022-02-04 | Resolved: 2022-10-06

## 2022-10-06 PROCEDURE — 3078F DIAST BP <80 MM HG: CPT | Performed by: FAMILY MEDICINE

## 2022-10-06 PROCEDURE — 99214 OFFICE O/P EST MOD 30 MIN: CPT | Performed by: FAMILY MEDICINE

## 2022-10-06 PROCEDURE — 3075F SYST BP GE 130 - 139MM HG: CPT | Performed by: FAMILY MEDICINE

## 2022-10-06 PROCEDURE — G0008 ADMIN INFLUENZA VIRUS VAC: HCPCS | Performed by: FAMILY MEDICINE

## 2022-10-06 PROCEDURE — 3008F BODY MASS INDEX DOCD: CPT | Performed by: FAMILY MEDICINE

## 2022-10-06 PROCEDURE — 90662 IIV NO PRSV INCREASED AG IM: CPT | Performed by: FAMILY MEDICINE

## 2022-10-06 NOTE — ASSESSMENT & PLAN NOTE
Diabetes seems to be well controlled. Continue metformin 1000 mg in the morning and 500 mg at night, continue Sitagliptin 100 mg daily. Seeing ophthalmology this fall. Follow-up in 4 months with me for diabetic examination.

## 2022-10-06 NOTE — ASSESSMENT & PLAN NOTE
Mood is improving. Overall feeling better at this time  Does not want to see a therapist.  Follow-up as needed.

## 2022-10-06 NOTE — ASSESSMENT & PLAN NOTE
Patient with left knee pain with movement. Suspect this is most likely related to osteoarthritis. Use Voltaren gel, Beng[place] as needed. Tylenol as needed. History of CKD, avoid oral NSAIDs. Referral to physical therapy provided. Can also consider steroid injections if without improvement.

## 2022-10-06 NOTE — ASSESSMENT & PLAN NOTE
Blood pressure within normal limits. She has stopped some of her blood pressure medications. For now continue lisinopril 5 mg daily, metoprolol tartrate 25 mg twice daily.

## 2022-10-06 NOTE — ASSESSMENT & PLAN NOTE
History of glaucoma, on Travatan ophthalmic solution. She reports that she has plans with ophthalmology for surgery of her left and right eye in November. If needing preoperative clearance, can follow-up with me for that prior to her surgery.

## 2022-10-07 ENCOUNTER — MED REC SCAN ONLY (OUTPATIENT)
Dept: INTERNAL MEDICINE CLINIC | Facility: CLINIC | Age: 77
End: 2022-10-07

## 2022-11-11 ENCOUNTER — LAB ENCOUNTER (OUTPATIENT)
Dept: LAB | Facility: HOSPITAL | Age: 77
End: 2022-11-11
Attending: UROLOGY
Payer: MEDICARE

## 2022-11-11 DIAGNOSIS — R31.0 GROSS HEMATURIA: ICD-10-CM

## 2022-11-11 DIAGNOSIS — N13.30 HYDROURETERONEPHROSIS: ICD-10-CM

## 2022-11-11 DIAGNOSIS — Z01.812 PRE-OPERATIVE LABORATORY EXAMINATION: Primary | ICD-10-CM

## 2022-11-11 DIAGNOSIS — N39.0 URINARY TRACT INFECTION, SITE NOT SPECIFIED: ICD-10-CM

## 2022-11-11 LAB
ANION GAP SERPL CALC-SCNC: 7 MMOL/L (ref 0–18)
BASOPHILS # BLD AUTO: 0.05 X10(3) UL (ref 0–0.2)
BASOPHILS NFR BLD AUTO: 0.7 %
BUN BLD-MCNC: 21 MG/DL (ref 7–18)
BUN/CREAT SERPL: 18.1 (ref 10–20)
CALCIUM BLD-MCNC: 8.9 MG/DL (ref 8.5–10.1)
CHLORIDE SERPL-SCNC: 108 MMOL/L (ref 98–112)
CO2 SERPL-SCNC: 25 MMOL/L (ref 21–32)
CREAT BLD-MCNC: 1.16 MG/DL
DEPRECATED RDW RBC AUTO: 53.1 FL (ref 35.1–46.3)
EOSINOPHIL # BLD AUTO: 0.24 X10(3) UL (ref 0–0.7)
EOSINOPHIL NFR BLD AUTO: 3.3 %
ERYTHROCYTE [DISTWIDTH] IN BLOOD BY AUTOMATED COUNT: 16.8 % (ref 11–15)
FASTING STATUS PATIENT QL REPORTED: YES
GFR SERPLBLD BASED ON 1.73 SQ M-ARVRAT: 49 ML/MIN/1.73M2 (ref 60–?)
GLUCOSE BLD-MCNC: 134 MG/DL (ref 70–99)
HCT VFR BLD AUTO: 37.4 %
HGB BLD-MCNC: 11.5 G/DL
IMM GRANULOCYTES # BLD AUTO: 0.03 X10(3) UL (ref 0–1)
IMM GRANULOCYTES NFR BLD: 0.4 %
LYMPHOCYTES # BLD AUTO: 2.5 X10(3) UL (ref 1–4)
LYMPHOCYTES NFR BLD AUTO: 34.2 %
MCH RBC QN AUTO: 26.3 PG (ref 26–34)
MCHC RBC AUTO-ENTMCNC: 30.7 G/DL (ref 31–37)
MCV RBC AUTO: 85.6 FL
MONOCYTES # BLD AUTO: 0.55 X10(3) UL (ref 0.1–1)
MONOCYTES NFR BLD AUTO: 7.5 %
NEUTROPHILS # BLD AUTO: 3.94 X10 (3) UL (ref 1.5–7.7)
NEUTROPHILS # BLD AUTO: 3.94 X10(3) UL (ref 1.5–7.7)
NEUTROPHILS NFR BLD AUTO: 53.9 %
OSMOLALITY SERPL CALC.SUM OF ELEC: 295 MOSM/KG (ref 275–295)
PLATELET # BLD AUTO: 381 10(3)UL (ref 150–450)
POTASSIUM SERPL-SCNC: 5 MMOL/L (ref 3.5–5.1)
RBC # BLD AUTO: 4.37 X10(6)UL
SODIUM SERPL-SCNC: 140 MMOL/L (ref 136–145)
WBC # BLD AUTO: 7.3 X10(3) UL (ref 4–11)

## 2022-11-11 PROCEDURE — 36415 COLL VENOUS BLD VENIPUNCTURE: CPT

## 2022-11-11 PROCEDURE — 85025 COMPLETE CBC W/AUTO DIFF WBC: CPT

## 2022-11-11 PROCEDURE — 87086 URINE CULTURE/COLONY COUNT: CPT

## 2022-11-11 PROCEDURE — 80048 BASIC METABOLIC PNL TOTAL CA: CPT

## 2022-11-12 ENCOUNTER — LAB ENCOUNTER (OUTPATIENT)
Dept: LAB | Age: 77
End: 2022-11-12
Attending: UROLOGY
Payer: MEDICARE

## 2022-11-12 DIAGNOSIS — Z01.818 PREOP TESTING: ICD-10-CM

## 2022-11-13 LAB — SARS-COV-2 RNA RESP QL NAA+PROBE: NOT DETECTED

## 2022-11-14 ENCOUNTER — ANESTHESIA EVENT (OUTPATIENT)
Dept: SURGERY | Facility: HOSPITAL | Age: 77
End: 2022-11-14
Payer: MEDICARE

## 2022-11-14 ENCOUNTER — HOSPITAL ENCOUNTER (OUTPATIENT)
Facility: HOSPITAL | Age: 77
Setting detail: HOSPITAL OUTPATIENT SURGERY
Discharge: HOME | End: 2022-11-14
Attending: UROLOGY | Admitting: UROLOGY
Payer: MEDICARE

## 2022-11-14 ENCOUNTER — APPOINTMENT (OUTPATIENT)
Dept: GENERAL RADIOLOGY | Facility: HOSPITAL | Age: 77
End: 2022-11-14
Attending: UROLOGY
Payer: MEDICARE

## 2022-11-14 ENCOUNTER — ANESTHESIA (OUTPATIENT)
Dept: SURGERY | Facility: HOSPITAL | Age: 77
End: 2022-11-14
Payer: MEDICARE

## 2022-11-14 VITALS
DIASTOLIC BLOOD PRESSURE: 67 MMHG | BODY MASS INDEX: 31.49 KG/M2 | RESPIRATION RATE: 16 BRPM | OXYGEN SATURATION: 99 % | HEIGHT: 58 IN | SYSTOLIC BLOOD PRESSURE: 119 MMHG | WEIGHT: 150 LBS | TEMPERATURE: 97 F | HEART RATE: 59 BPM

## 2022-11-14 DIAGNOSIS — Z01.818 PREOP TESTING: Primary | ICD-10-CM

## 2022-11-14 LAB
GLUCOSE BLDC GLUCOMTR-MCNC: 102 MG/DL (ref 70–99)
GLUCOSE BLDC GLUCOMTR-MCNC: 125 MG/DL (ref 70–99)

## 2022-11-14 PROCEDURE — 88305 TISSUE EXAM BY PATHOLOGIST: CPT | Performed by: UROLOGY

## 2022-11-14 PROCEDURE — 0T9680Z DRAINAGE OF RIGHT URETER WITH DRAINAGE DEVICE, VIA NATURAL OR ARTIFICIAL OPENING ENDOSCOPIC: ICD-10-PCS | Performed by: UROLOGY

## 2022-11-14 PROCEDURE — 82962 GLUCOSE BLOOD TEST: CPT

## 2022-11-14 PROCEDURE — BT1D1ZZ FLUOROSCOPY OF RIGHT KIDNEY, URETER AND BLADDER USING LOW OSMOLAR CONTRAST: ICD-10-PCS | Performed by: UROLOGY

## 2022-11-14 PROCEDURE — 0TP98DZ REMOVAL OF INTRALUMINAL DEVICE FROM URETER, VIA NATURAL OR ARTIFICIAL OPENING ENDOSCOPIC: ICD-10-PCS | Performed by: UROLOGY

## 2022-11-14 PROCEDURE — 0TB38ZX EXCISION OF RIGHT KIDNEY PELVIS, VIA NATURAL OR ARTIFICIAL OPENING ENDOSCOPIC, DIAGNOSTIC: ICD-10-PCS | Performed by: UROLOGY

## 2022-11-14 DEVICE — URETERAL STENT
Type: IMPLANTABLE DEVICE | Site: URETER | Status: FUNCTIONAL
Brand: ASCERTA™

## 2022-11-14 RX ORDER — NICOTINE POLACRILEX 4 MG
15 LOZENGE BUCCAL
Status: DISCONTINUED | OUTPATIENT
Start: 2022-11-14 | End: 2022-11-14 | Stop reason: HOSPADM

## 2022-11-14 RX ORDER — MORPHINE SULFATE 4 MG/ML
4 INJECTION, SOLUTION INTRAMUSCULAR; INTRAVENOUS EVERY 10 MIN PRN
Status: DISCONTINUED | OUTPATIENT
Start: 2022-11-14 | End: 2022-11-14

## 2022-11-14 RX ORDER — CEPHALEXIN 500 MG/1
500 CAPSULE ORAL 2 TIMES DAILY
Qty: 6 CAPSULE | Refills: 0 | Status: SHIPPED | OUTPATIENT
Start: 2022-11-14

## 2022-11-14 RX ORDER — CEFAZOLIN SODIUM/WATER 2 G/20 ML
2 SYRINGE (ML) INTRAVENOUS
Status: COMPLETED | OUTPATIENT
Start: 2022-11-14 | End: 2022-11-14

## 2022-11-14 RX ORDER — PHENYLEPHRINE HCL 10 MG/ML
VIAL (ML) INJECTION AS NEEDED
Status: DISCONTINUED | OUTPATIENT
Start: 2022-11-14 | End: 2022-11-14 | Stop reason: SURG

## 2022-11-14 RX ORDER — NICOTINE POLACRILEX 4 MG
30 LOZENGE BUCCAL
Status: DISCONTINUED | OUTPATIENT
Start: 2022-11-14 | End: 2022-11-14 | Stop reason: HOSPADM

## 2022-11-14 RX ORDER — DEXTROSE MONOHYDRATE 25 G/50ML
50 INJECTION, SOLUTION INTRAVENOUS
Status: DISCONTINUED | OUTPATIENT
Start: 2022-11-14 | End: 2022-11-14 | Stop reason: HOSPADM

## 2022-11-14 RX ORDER — ONDANSETRON 2 MG/ML
INJECTION INTRAMUSCULAR; INTRAVENOUS AS NEEDED
Status: DISCONTINUED | OUTPATIENT
Start: 2022-11-14 | End: 2022-11-14 | Stop reason: SURG

## 2022-11-14 RX ORDER — HYDROCODONE BITARTRATE AND ACETAMINOPHEN 5; 325 MG/1; MG/1
1-2 TABLET ORAL EVERY 6 HOURS PRN
Qty: 10 TABLET | Refills: 0 | Status: SHIPPED | OUTPATIENT
Start: 2022-11-14

## 2022-11-14 RX ORDER — LIDOCAINE HYDROCHLORIDE 10 MG/ML
INJECTION, SOLUTION EPIDURAL; INFILTRATION; INTRACAUDAL; PERINEURAL AS NEEDED
Status: DISCONTINUED | OUTPATIENT
Start: 2022-11-14 | End: 2022-11-14 | Stop reason: SURG

## 2022-11-14 RX ORDER — HYDROMORPHONE HYDROCHLORIDE 1 MG/ML
0.6 INJECTION, SOLUTION INTRAMUSCULAR; INTRAVENOUS; SUBCUTANEOUS EVERY 5 MIN PRN
Status: DISCONTINUED | OUTPATIENT
Start: 2022-11-14 | End: 2022-11-14

## 2022-11-14 RX ORDER — METOCLOPRAMIDE 10 MG/1
10 TABLET ORAL ONCE
Status: COMPLETED | OUTPATIENT
Start: 2022-11-14 | End: 2022-11-14

## 2022-11-14 RX ORDER — ETOMIDATE 2 MG/ML
INJECTION INTRAVENOUS AS NEEDED
Status: DISCONTINUED | OUTPATIENT
Start: 2022-11-14 | End: 2022-11-14 | Stop reason: SURG

## 2022-11-14 RX ORDER — HYDROMORPHONE HYDROCHLORIDE 1 MG/ML
0.4 INJECTION, SOLUTION INTRAMUSCULAR; INTRAVENOUS; SUBCUTANEOUS EVERY 5 MIN PRN
Status: DISCONTINUED | OUTPATIENT
Start: 2022-11-14 | End: 2022-11-14

## 2022-11-14 RX ORDER — SODIUM CHLORIDE, SODIUM LACTATE, POTASSIUM CHLORIDE, CALCIUM CHLORIDE 600; 310; 30; 20 MG/100ML; MG/100ML; MG/100ML; MG/100ML
INJECTION, SOLUTION INTRAVENOUS CONTINUOUS
Status: DISCONTINUED | OUTPATIENT
Start: 2022-11-14 | End: 2022-11-14

## 2022-11-14 RX ORDER — GLYCOPYRROLATE 0.2 MG/ML
INJECTION, SOLUTION INTRAMUSCULAR; INTRAVENOUS AS NEEDED
Status: DISCONTINUED | OUTPATIENT
Start: 2022-11-14 | End: 2022-11-14 | Stop reason: SURG

## 2022-11-14 RX ORDER — FAMOTIDINE 20 MG/1
20 TABLET, FILM COATED ORAL ONCE
Status: COMPLETED | OUTPATIENT
Start: 2022-11-14 | End: 2022-11-14

## 2022-11-14 RX ORDER — MORPHINE SULFATE 10 MG/ML
6 INJECTION, SOLUTION INTRAMUSCULAR; INTRAVENOUS EVERY 10 MIN PRN
Status: DISCONTINUED | OUTPATIENT
Start: 2022-11-14 | End: 2022-11-14

## 2022-11-14 RX ORDER — HYDROMORPHONE HYDROCHLORIDE 1 MG/ML
0.2 INJECTION, SOLUTION INTRAMUSCULAR; INTRAVENOUS; SUBCUTANEOUS EVERY 5 MIN PRN
Status: DISCONTINUED | OUTPATIENT
Start: 2022-11-14 | End: 2022-11-14

## 2022-11-14 RX ORDER — ACETAMINOPHEN 500 MG
1000 TABLET ORAL ONCE
Status: COMPLETED | OUTPATIENT
Start: 2022-11-14 | End: 2022-11-14

## 2022-11-14 RX ORDER — MORPHINE SULFATE 4 MG/ML
2 INJECTION, SOLUTION INTRAMUSCULAR; INTRAVENOUS EVERY 10 MIN PRN
Status: DISCONTINUED | OUTPATIENT
Start: 2022-11-14 | End: 2022-11-14

## 2022-11-14 RX ORDER — NALOXONE HYDROCHLORIDE 0.4 MG/ML
80 INJECTION, SOLUTION INTRAMUSCULAR; INTRAVENOUS; SUBCUTANEOUS AS NEEDED
Status: DISCONTINUED | OUTPATIENT
Start: 2022-11-14 | End: 2022-11-14

## 2022-11-14 RX ADMIN — ETOMIDATE 12 MG: 2 INJECTION INTRAVENOUS at 10:29:00

## 2022-11-14 RX ADMIN — CEFAZOLIN SODIUM/WATER 2 G: 2 G/20 ML SYRINGE (ML) INTRAVENOUS at 10:30:00

## 2022-11-14 RX ADMIN — PHENYLEPHRINE HCL 100 MCG: 10 MG/ML VIAL (ML) INJECTION at 10:45:00

## 2022-11-14 RX ADMIN — GLYCOPYRROLATE 0.2 MG: 0.2 INJECTION, SOLUTION INTRAMUSCULAR; INTRAVENOUS at 10:29:00

## 2022-11-14 RX ADMIN — SODIUM CHLORIDE, SODIUM LACTATE, POTASSIUM CHLORIDE, CALCIUM CHLORIDE: 600; 310; 30; 20 INJECTION, SOLUTION INTRAVENOUS at 10:29:00

## 2022-11-14 RX ADMIN — ONDANSETRON 4 MG: 2 INJECTION INTRAMUSCULAR; INTRAVENOUS at 11:07:00

## 2022-11-14 RX ADMIN — LIDOCAINE HYDROCHLORIDE 40 MG: 10 INJECTION, SOLUTION EPIDURAL; INFILTRATION; INTRACAUDAL; PERINEURAL at 10:29:00

## 2022-11-14 NOTE — BRIEF OP NOTE
Pre-Operative Diagnosis: Gross hematuria, right hydronephrosis     Post-Operative Diagnosis: Gross hematuria, right hydronephrosis, right upper pole calyx mass     Procedure Performed:   Cystoscopy, right retrograde pyelogram, right ureteroscopy, right calyx biopsy, right ureteral stent exchange     Surgeon(s) and Role:     Tera Castillo DO - Primary    Assistant(s):   None     Surgical Findings: 1cm papillary right upper pole renal mass     Specimen: right upper pole calyx biopsy     Estimated Blood Loss: Minimal    Dictation Number:  [id number]    Jay Wilson DO  11/14/2022  11:22 AM

## 2022-11-14 NOTE — ANESTHESIA POSTPROCEDURE EVALUATION
Patient: Jeffy Camacho    Procedure Summary     Date: 11/14/22 Room / Location: [facility] OR 14 / [facility] OR    Anesthesia Start: 5391 Anesthesia Stop: 7851    Procedure: Cystoscopy, right retrograde pyelogram, right ureteroscopy, right calyx biopsy, right ureteral stent exchange (Right) Diagnosis: (Gross hematuria, right hydronephrosis)    Surgeons: John Valiente DO Anesthesiologist: Damon Landin MD    Anesthesia Type: general ASA Status: 3          Anesthesia Type: general    Vitals Value Taken Time   /74 11/14/22 1123   Temp 97.7 11/14/22 1124   Pulse 63 11/14/22 1124   Resp 23 11/14/22 1124   SpO2 100 % 11/14/22 1124   Vitals shown include unvalidated device data.     [facility] Post Evaluation:   Patient Evaluated in PACU  Patient Participation: complete - patient participated  Level of Consciousness: awake  Pain Score: 0  Pain Management: adequate  Airway Patency:patent  Yes    Cardiovascular Status: acceptable  Respiratory Status: acceptable  Postoperative Hydration acceptable      Ashu Frias MD  11/14/2022 11:24 AM

## 2022-11-14 NOTE — ANESTHESIA PROCEDURE NOTES
Airway  Date/Time: 11/14/2022 10:33 AM  Urgency: elective    Airway not difficult    General Information and Staff    Patient location during procedure: OR  Anesthesiologist: Maikel Kelly MD  Performed: anesthesiologist     Indications and Patient Condition  Indications for airway management: anesthesia  Spontaneous ventilation: present  Sedation level: deep  Preoxygenated: yes  Patient position: sniffing  Mask difficulty assessment: 1 - vent by mask    Final Airway Details  Final airway type: supraglottic airway      Successful airway: classic  Size 4       Number of attempts at approach: 1  Number of other approaches attempted: 0

## 2022-11-15 NOTE — OPERATIVE REPORT
[facility]    PATIENT'S NAME: Efrain Maharaj   ATTENDING PHYSICIAN: Catalina Brannon. Sherry Burnette DO   OPERATING PHYSICIAN: Catalina Brannon. Sherry Burnette DO   PATIENT ACCOUNT#:   [de-identified]    LOCATION:  [facility] 2 [facility]  MEDICAL RECORD #:   [id number]       YOB: 1945  ADMISSION DATE:       11/14/2022      OPERATION DATE:  11/14/2022    OPERATIVE REPORT    PREOPERATIVE DIAGNOSIS:  Gross hematuria, right hydronephrosis. POSTOPERATIVE DIAGNOSIS:  Right gross hematuria, right hydronephrosis, and right upper pole calyx mass. PROCEDURE:  Cystoscopy, right retrograde pyelogram, right ureteroscopy, right upper pole calyx biopsy, right ureteral stent exchange. ASSISTANT:  None. ANESTHESIA:  General.    ESTIMATED BLOOD LOSS:  Minimal.    INTRAVENOUS FLUIDS:  See anesthesia notes. URINE OUTPUT:  Not recorded. DRAINS:  Right 6-Pakistani x 22 cm JJ ureteral stents. SPECIMENS:  Right upper pole calyx biopsy. COMPLICATIONS:  None. DISPOSITION:  Stable to recovery room. INDICATIONS:  A 79-year-old female with a history of gross hematuria and right hydronephrosis. She underwent a diagnostic ureteroscopy in August of 2022, which was severely limited by poor visualization due to clot within the collecting system. She is here now for second-stage procedure. Risks including, but not limited to, bleeding, infection, pain, ureteral injury, stricture, failure to remove tumor, need for further procedures were discussed at length. All good and pertinent questions were answered and informed consent obtained. FINDINGS:  Normal-appearing urethra, bladder without evidence of tumor, stone or mucosal abnormality. Right retrograde pyelogram with a normal caliber ureter without ureteral filling defect or hydronephrosis. On ureteroscopic evaluation, in the upper pole calyx there was a papillary-appearing 1 cm mass with a relatively small base. The lesion was completely removed and fulgurated.   The remainder of the collecting system, including each calyx was inspected and without any evidence of tumor. The ureter was clear of tumor, as well. OPERATIVE TECHNIQUE:  Patient was seen in the preoperative holding area where the consent was reviewed. She was taken to the operative suite and placed on the table in supine position. SCDs were applied. General anesthetic and IV antibiotics were administered by the anesthesia service. After allowing adequate time for the anesthetic to take effect, the patient was placed in the dorsal lithotomy position. The perineum and genitalia were prepped and draped in the usual sterile fashion. A time-out was performed. A 22-Icelandic cystoscope with 30-degree lens was advanced into the bladder. The bladder was inspected in its entirety with the results noted above. Attention was turned to the indwelling right ureteral stent, and it was pulled to the meatus using a flexible grasper. Via the stent, I advanced a Sensor wire into the collecting system without difficulty. The stent was removed. Alongside the wire, I advanced a 6-Icelandic open ended ureteral catheter, retrograde pyelogram performed with the results above. We then placed a second wire in a similar fashion to the first.  Over 1 of the wires, I advanced an 11 x 13 Icelandic ureteral access sheath to the UPJ under fluoroscopic guidance without difficulty. Via the sheath, I advanced the flexible ureteroscope into the collecting system, which was free of clot. I inspected each calyx. All were normal with the exception of the upper pole calyx, which had a 1 cm papillary renal mass. This was biopsied using both the BIGopsy and the Piranha forceps. Adequate specimen was obtained, 90% of the tumor was removed. The only small portion was remaining at the base, which was fulgurated using the Bugbee. There was no evidence of any viable tumor. At the end of the procedure there was no significant bleeding.   Removed the scope and sheath under direct visualization. There was no evidence of any tumor or ureteral injury. Over the remaining wire, a 6-Upper sorbian x 22 cm JJ ureteral stent was placed. Spot fluoroscopy demonstrated an adequate curl in the collecting system and within the bladder. The bladder was then drained. Cystoscope removed. The patient tolerated the procedure well, and there were no complications. PLAN:  Stent removal in 1 week. Dictated By Juana Argueta DO  d: 11/14/2022 11:26:33  t: 11/14/2022 18:19:38  [facility] [id number]  [facility]/

## 2022-11-30 ENCOUNTER — TELEPHONE (OUTPATIENT)
Dept: INTERNAL MEDICINE CLINIC | Facility: CLINIC | Age: 77
End: 2022-11-30

## 2022-11-30 NOTE — TELEPHONE ENCOUNTER
Pt started having some SOB this am around 1:00 am along with some chest pain. The pain \"comes and goes\". Asked her to clarify that statement. Patient stated that she gets these symptoms every so often and not that the pain comes and goes today. Took isosorbide. At time of return call, patient stated she was feeling better and relaxing with minimal activity so that the pain does not return. Had similar episode one month ago. Several days go she had a pain in her left arm that went from her arm to elbow. She re-started taking aspirin after being told to stop due to kidney concerns. Advised patient to call her cardiologist today AND if the pain returns to please go to ER. Her symptoms are most likely cardiac related and should not be ignored. Patient agreed to both action steps.

## 2022-11-30 NOTE — TELEPHONE ENCOUNTER
Pt is calling to speak to with . Pt started having some sob this am around 1:00 am also having some chest pain that comes and goes. Got up and started walking around to see if that would help also took some medication she had at home. Has an appointment with a cardiologist but not till 12-.

## 2022-12-08 ENCOUNTER — OFFICE VISIT (OUTPATIENT)
Dept: INTERNAL MEDICINE CLINIC | Facility: CLINIC | Age: 77
End: 2022-12-08
Payer: COMMERCIAL

## 2022-12-08 VITALS
SYSTOLIC BLOOD PRESSURE: 126 MMHG | HEART RATE: 68 BPM | BODY MASS INDEX: 31.49 KG/M2 | DIASTOLIC BLOOD PRESSURE: 60 MMHG | WEIGHT: 150 LBS | RESPIRATION RATE: 16 BRPM | TEMPERATURE: 97 F | HEIGHT: 58 IN | OXYGEN SATURATION: 99 %

## 2022-12-08 DIAGNOSIS — C68.9 UROTHELIAL CARCINOMA (HCC): Primary | ICD-10-CM

## 2022-12-08 DIAGNOSIS — I50.9 HEART FAILURE, UNSPECIFIED HF CHRONICITY, UNSPECIFIED HEART FAILURE TYPE (HCC): ICD-10-CM

## 2022-12-08 DIAGNOSIS — I10 ESSENTIAL HYPERTENSION: ICD-10-CM

## 2022-12-08 DIAGNOSIS — E78.5 HYPERLIPIDEMIA, UNSPECIFIED HYPERLIPIDEMIA TYPE: ICD-10-CM

## 2022-12-08 DIAGNOSIS — H26.9 CATARACT OF BOTH EYES, UNSPECIFIED CATARACT TYPE: ICD-10-CM

## 2022-12-08 DIAGNOSIS — K21.9 GASTROESOPHAGEAL REFLUX DISEASE, UNSPECIFIED WHETHER ESOPHAGITIS PRESENT: ICD-10-CM

## 2022-12-08 PROCEDURE — 99214 OFFICE O/P EST MOD 30 MIN: CPT | Performed by: FAMILY MEDICINE

## 2022-12-08 PROCEDURE — 3008F BODY MASS INDEX DOCD: CPT | Performed by: FAMILY MEDICINE

## 2022-12-08 PROCEDURE — 3074F SYST BP LT 130 MM HG: CPT | Performed by: FAMILY MEDICINE

## 2022-12-08 PROCEDURE — 3078F DIAST BP <80 MM HG: CPT | Performed by: FAMILY MEDICINE

## 2022-12-08 RX ORDER — ISOSORBIDE MONONITRATE 30 MG/1
30 TABLET, EXTENDED RELEASE ORAL DAILY
COMMUNITY
End: 2022-12-08

## 2022-12-08 RX ORDER — ISOSORBIDE MONONITRATE 30 MG/1
30 TABLET, EXTENDED RELEASE ORAL DAILY
Qty: 30 TABLET | Refills: 0 | Status: SHIPPED | OUTPATIENT
Start: 2022-12-08

## 2022-12-08 RX ORDER — PANTOPRAZOLE SODIUM 40 MG/1
40 TABLET, DELAYED RELEASE ORAL
COMMUNITY
End: 2022-12-08

## 2022-12-08 RX ORDER — PANTOPRAZOLE SODIUM 40 MG/1
40 TABLET, DELAYED RELEASE ORAL
Qty: 90 TABLET | Refills: 0 | Status: SHIPPED | OUTPATIENT
Start: 2022-12-08

## 2022-12-08 NOTE — ASSESSMENT & PLAN NOTE
Hypertension well-controlled at this time.   Continue lisinopril 5 mg daily, metoprolol tartrate 25 mg twice daily

## 2022-12-08 NOTE — ASSESSMENT & PLAN NOTE
History of acid reflux, generally well controlled. Periodically using pantoprazole as needed-would like a refill.

## 2022-12-08 NOTE — ASSESSMENT & PLAN NOTE
Patient with a new diagnosis of papillary urothelial carcinoma. Following with urology at this time.

## 2022-12-08 NOTE — ASSESSMENT & PLAN NOTE
History of cataracts, notes that she recently had cataract surgery in both of her eyes  Reports that she is overall recovering well at this time.

## 2022-12-08 NOTE — PATIENT INSTRUCTIONS
PATIENT INSTRUCTIONS    Thank you for seeing me today, it was a pleasure taking care of you. Please check out at the  and schedule a follow up appointment. Return in about 2 months (around 2/8/2023) for medicare visit.   Continue to see cardiology  Continue to see urology, please have him fax over notes to my office  Continue to see eye doctor  If with significant chest pain, go to the ER       Best,  Dr. Casey Lew

## 2022-12-08 NOTE — ASSESSMENT & PLAN NOTE
Overall she reports breathing and pain are controlled. Not following with cardiology. Intermittent short lasting chest pain, no pain at this time. Using isosorbide as needed. Explained to patient if with persistent chest pain she should go to the emergency department right away. For now continue lisinopril 5 mg daily, metoprolol tartrate 25 mg twice daily.

## 2022-12-16 ENCOUNTER — HOSPITAL ENCOUNTER (OUTPATIENT)
Dept: ULTRASOUND IMAGING | Facility: HOSPITAL | Age: 77
Discharge: HOME | End: 2022-12-16
Attending: UROLOGY
Payer: MEDICARE

## 2022-12-16 DIAGNOSIS — R31.0 GROSS HEMATURIA: ICD-10-CM

## 2022-12-16 PROCEDURE — 76775 US EXAM ABDO BACK WALL LIM: CPT | Performed by: UROLOGY

## 2023-01-10 DIAGNOSIS — E11.3293 TYPE 2 DIABETES MELLITUS WITH BOTH EYES AFFECTED BY MILD NONPROLIFERATIVE RETINOPATHY WITHOUT MACULAR EDEMA, WITHOUT LONG-TERM CURRENT USE OF INSULIN (HCC): Primary | ICD-10-CM

## 2023-01-10 DIAGNOSIS — I50.9 HEART FAILURE, UNSPECIFIED HF CHRONICITY, UNSPECIFIED HEART FAILURE TYPE (HCC): ICD-10-CM

## 2023-01-10 RX ORDER — ISOSORBIDE MONONITRATE 30 MG/1
TABLET, EXTENDED RELEASE ORAL
Qty: 30 TABLET | Refills: 0 | OUTPATIENT
Start: 2023-01-10

## 2023-01-10 RX ORDER — ISOSORBIDE MONONITRATE 30 MG/1
30 TABLET, EXTENDED RELEASE ORAL DAILY
Qty: 90 TABLET | Refills: 3 | Status: SHIPPED | OUTPATIENT
Start: 2023-01-10

## 2023-02-05 ENCOUNTER — LAB ENCOUNTER (OUTPATIENT)
Dept: LAB | Facility: HOSPITAL | Age: 78
End: 2023-02-05
Attending: UROLOGY
Payer: MEDICARE

## 2023-02-05 DIAGNOSIS — C68.9 UROTHELIAL CARCINOMA (HCC): ICD-10-CM

## 2023-02-05 DIAGNOSIS — Z01.818 PRE-OP EVALUATION: ICD-10-CM

## 2023-02-05 DIAGNOSIS — Z01.818 PRE-OP EVALUATION: Primary | ICD-10-CM

## 2023-02-05 DIAGNOSIS — N39.0 ACUTE LOWER URINARY TRACT INFECTION: ICD-10-CM

## 2023-02-05 LAB
ANION GAP SERPL CALC-SCNC: 4 MMOL/L (ref 0–18)
ATRIAL RATE: 63 BPM
BASOPHILS # BLD AUTO: 0.05 X10(3) UL (ref 0–0.2)
BASOPHILS NFR BLD AUTO: 0.8 %
BUN BLD-MCNC: 36 MG/DL (ref 7–18)
BUN/CREAT SERPL: 26.1 (ref 10–20)
CALCIUM BLD-MCNC: 9.6 MG/DL (ref 8.5–10.1)
CHLORIDE SERPL-SCNC: 106 MMOL/L (ref 98–112)
CO2 SERPL-SCNC: 30 MMOL/L (ref 21–32)
CREAT BLD-MCNC: 1.38 MG/DL
DEPRECATED RDW RBC AUTO: 47.4 FL (ref 35.1–46.3)
EOSINOPHIL # BLD AUTO: 0.2 X10(3) UL (ref 0–0.7)
EOSINOPHIL NFR BLD AUTO: 3.1 %
ERYTHROCYTE [DISTWIDTH] IN BLOOD BY AUTOMATED COUNT: 15.2 % (ref 11–15)
FASTING STATUS PATIENT QL REPORTED: NO
GFR SERPLBLD BASED ON 1.73 SQ M-ARVRAT: 40 ML/MIN/1.73M2 (ref 60–?)
GLUCOSE BLD-MCNC: 139 MG/DL (ref 70–99)
HCT VFR BLD AUTO: 38 %
HGB BLD-MCNC: 11.9 G/DL
IMM GRANULOCYTES # BLD AUTO: 0.01 X10(3) UL (ref 0–1)
IMM GRANULOCYTES NFR BLD: 0.2 %
LYMPHOCYTES # BLD AUTO: 2.65 X10(3) UL (ref 1–4)
LYMPHOCYTES NFR BLD AUTO: 40.8 %
MCH RBC QN AUTO: 26.6 PG (ref 26–34)
MCHC RBC AUTO-ENTMCNC: 31.3 G/DL (ref 31–37)
MCV RBC AUTO: 85 FL
MONOCYTES # BLD AUTO: 0.46 X10(3) UL (ref 0.1–1)
MONOCYTES NFR BLD AUTO: 7.1 %
NEUTROPHILS # BLD AUTO: 3.12 X10 (3) UL (ref 1.5–7.7)
NEUTROPHILS # BLD AUTO: 3.12 X10(3) UL (ref 1.5–7.7)
NEUTROPHILS NFR BLD AUTO: 48 %
OSMOLALITY SERPL CALC.SUM OF ELEC: 301 MOSM/KG (ref 275–295)
P AXIS: 64 DEGREES
P-R INTERVAL: 132 MS
PLATELET # BLD AUTO: 314 10(3)UL (ref 150–450)
POTASSIUM SERPL-SCNC: 4 MMOL/L (ref 3.5–5.1)
Q-T INTERVAL: 394 MS
QRS DURATION: 74 MS
QTC CALCULATION (BEZET): 403 MS
R AXIS: 11 DEGREES
RBC # BLD AUTO: 4.47 X10(6)UL
SODIUM SERPL-SCNC: 140 MMOL/L (ref 136–145)
T AXIS: 43 DEGREES
VENTRICULAR RATE: 63 BPM
WBC # BLD AUTO: 6.5 X10(3) UL (ref 4–11)

## 2023-02-05 PROCEDURE — 80048 BASIC METABOLIC PNL TOTAL CA: CPT

## 2023-02-05 PROCEDURE — 93005 ELECTROCARDIOGRAM TRACING: CPT

## 2023-02-05 PROCEDURE — 85025 COMPLETE CBC W/AUTO DIFF WBC: CPT

## 2023-02-05 PROCEDURE — 83036 HEMOGLOBIN GLYCOSYLATED A1C: CPT | Performed by: FAMILY MEDICINE

## 2023-02-05 PROCEDURE — 93010 ELECTROCARDIOGRAM REPORT: CPT | Performed by: INTERNAL MEDICINE

## 2023-02-05 PROCEDURE — 87086 URINE CULTURE/COLONY COUNT: CPT

## 2023-02-05 PROCEDURE — 36415 COLL VENOUS BLD VENIPUNCTURE: CPT

## 2023-02-06 ENCOUNTER — OFFICE VISIT (OUTPATIENT)
Dept: INTERNAL MEDICINE CLINIC | Facility: CLINIC | Age: 78
End: 2023-02-06
Payer: COMMERCIAL

## 2023-02-06 ENCOUNTER — TELEPHONE (OUTPATIENT)
Dept: INTERNAL MEDICINE CLINIC | Facility: CLINIC | Age: 78
End: 2023-02-06

## 2023-02-06 VITALS
TEMPERATURE: 97 F | HEART RATE: 53 BPM | OXYGEN SATURATION: 99 % | WEIGHT: 145 LBS | BODY MASS INDEX: 30.44 KG/M2 | RESPIRATION RATE: 16 BRPM | SYSTOLIC BLOOD PRESSURE: 128 MMHG | DIASTOLIC BLOOD PRESSURE: 76 MMHG | HEIGHT: 58 IN

## 2023-02-06 DIAGNOSIS — H26.9 CATARACT OF BOTH EYES, UNSPECIFIED CATARACT TYPE: ICD-10-CM

## 2023-02-06 DIAGNOSIS — Z01.818 PREOPERATIVE EXAMINATION: ICD-10-CM

## 2023-02-06 DIAGNOSIS — M85.80 OSTEOPENIA, UNSPECIFIED LOCATION: ICD-10-CM

## 2023-02-06 DIAGNOSIS — E66.9 CLASS 1 OBESITY WITH SERIOUS COMORBIDITY AND BODY MASS INDEX (BMI) OF 30.0 TO 30.9 IN ADULT, UNSPECIFIED OBESITY TYPE: ICD-10-CM

## 2023-02-06 DIAGNOSIS — K74.60 CIRRHOSIS OF LIVER WITHOUT ASCITES, UNSPECIFIED HEPATIC CIRRHOSIS TYPE (HCC): ICD-10-CM

## 2023-02-06 DIAGNOSIS — I10 ESSENTIAL HYPERTENSION: ICD-10-CM

## 2023-02-06 DIAGNOSIS — C68.9 UROTHELIAL CARCINOMA (HCC): ICD-10-CM

## 2023-02-06 DIAGNOSIS — H40.9 GLAUCOMA OF BOTH EYES, UNSPECIFIED GLAUCOMA TYPE: ICD-10-CM

## 2023-02-06 DIAGNOSIS — M15.9 PRIMARY OSTEOARTHRITIS INVOLVING MULTIPLE JOINTS: ICD-10-CM

## 2023-02-06 DIAGNOSIS — E78.5 HYPERLIPIDEMIA, UNSPECIFIED HYPERLIPIDEMIA TYPE: ICD-10-CM

## 2023-02-06 DIAGNOSIS — K21.9 GASTROESOPHAGEAL REFLUX DISEASE, UNSPECIFIED WHETHER ESOPHAGITIS PRESENT: ICD-10-CM

## 2023-02-06 DIAGNOSIS — F32.0 CURRENT MILD EPISODE OF MAJOR DEPRESSIVE DISORDER WITHOUT PRIOR EPISODE (HCC): ICD-10-CM

## 2023-02-06 DIAGNOSIS — Z00.00 HEALTH MAINTENANCE EXAMINATION: Primary | ICD-10-CM

## 2023-02-06 DIAGNOSIS — E11.3293 TYPE 2 DIABETES MELLITUS WITH BOTH EYES AFFECTED BY MILD NONPROLIFERATIVE RETINOPATHY WITHOUT MACULAR EDEMA, WITHOUT LONG-TERM CURRENT USE OF INSULIN (HCC): ICD-10-CM

## 2023-02-06 DIAGNOSIS — I50.9 HEART FAILURE, UNSPECIFIED HF CHRONICITY, UNSPECIFIED HEART FAILURE TYPE (HCC): ICD-10-CM

## 2023-02-06 DIAGNOSIS — N18.31 STAGE 3A CHRONIC KIDNEY DISEASE (HCC): Chronic | ICD-10-CM

## 2023-02-06 PROBLEM — N12 PYELONEPHRITIS: Status: RESOLVED | Noted: 2022-06-13 | Resolved: 2023-02-06

## 2023-02-06 PROBLEM — M41.24 OTHER IDIOPATHIC SCOLIOSIS, THORACIC REGION: Status: RESOLVED | Noted: 2020-09-18 | Resolved: 2023-02-06

## 2023-02-06 PROBLEM — N13.30 HYDRONEPHROSIS: Status: RESOLVED | Noted: 2022-09-06 | Resolved: 2023-02-06

## 2023-02-06 PROBLEM — E66.811 CLASS 1 OBESITY WITH SERIOUS COMORBIDITY AND BODY MASS INDEX (BMI) OF 30.0 TO 30.9 IN ADULT: Status: ACTIVE | Noted: 2019-08-19

## 2023-02-06 LAB
EST. AVERAGE GLUCOSE BLD GHB EST-MCNC: 137 MG/DL (ref 68–126)
HBA1C MFR BLD: 6.4 % (ref ?–5.7)

## 2023-02-06 NOTE — ASSESSMENT & PLAN NOTE
History of osteoarthritis of her knees. Overall walking ok - climbs stairs for exercise everyday  Using Voltaren gel. History of CKD. Avoid oral NSAIDs.

## 2023-02-06 NOTE — TELEPHONE ENCOUNTER
Faxed following to # provided for upcoming surgery.     Preop evaluation note   labs and EKG   Dr Jenny Fontenot   Fax: [phone]

## 2023-02-06 NOTE — ASSESSMENT & PLAN NOTE
Diabetes overall controlled. Check hemoglobin a1c with add on labs. Continue metformin at 1000 mg twice daily, continue Sitagliptin 100 mg daily. Saw ophthalmology-need to acquire records. Can hold medications day of surgery. Follow-up with me in 6 months to reassess.

## 2023-02-06 NOTE — ASSESSMENT & PLAN NOTE
Overall she reports breathing and pain are controlled. Following with cardiology. Doing well. Had recent stress test in 12/2022. Using isosorbide as needed. For now continue lisinopril 5 mg daily, metoprolol tartrate 25 mg twice daily.

## 2023-02-06 NOTE — ASSESSMENT & PLAN NOTE
Continue calcium and vitamin D. Continue mild weight bearing exercises - walking, climbing stairs. Immediate family member

## 2023-02-06 NOTE — ASSESSMENT & PLAN NOTE
Hypertension well-controlled at this time. Continue lisinopril 5 mg daily, metoprolol tartrate 25 mg twice daily. Can hold lisinopril day of surgery.

## 2023-02-06 NOTE — ASSESSMENT & PLAN NOTE
Mood is improving. Does have some anxiety given health concerns. Notes that she has good support with her friends at this time. Discussed if needed, can consider therapy. Follow-up as needed.

## 2023-02-06 NOTE — ASSESSMENT & PLAN NOTE
History of cataracts, history of cataract surgery bilateral eyes. Follows with ophthalmology. Doing well at this time.

## 2023-02-14 ENCOUNTER — LAB ENCOUNTER (OUTPATIENT)
Dept: LAB | Facility: HOSPITAL | Age: 78
End: 2023-02-14
Attending: UROLOGY
Payer: MEDICARE

## 2023-02-14 DIAGNOSIS — Z01.818 PREOPERATIVE EXAMINATION: ICD-10-CM

## 2023-02-14 LAB — SARS-COV-2 RNA RESP QL NAA+PROBE: NOT DETECTED

## 2023-02-16 RX ORDER — LISINOPRIL AND HYDROCHLOROTHIAZIDE 25; 20 MG/1; MG/1
1 TABLET ORAL DAILY
COMMUNITY

## 2023-02-16 RX ORDER — ACETAMINOPHEN 160 MG
2000 TABLET,DISINTEGRATING ORAL DAILY
COMMUNITY

## 2023-02-16 RX ORDER — ACETAMINOPHEN 500 MG
500 TABLET ORAL EVERY 6 HOURS PRN
COMMUNITY

## 2023-02-16 RX ORDER — ASPIRIN 81 MG/1
TABLET, CHEWABLE ORAL DAILY
COMMUNITY

## 2023-02-16 RX ORDER — UBIDECARENONE 75 MG
250 CAPSULE ORAL DAILY
COMMUNITY
End: 2023-02-16 | Stop reason: ALTCHOICE

## 2023-02-17 ENCOUNTER — APPOINTMENT (OUTPATIENT)
Dept: GENERAL RADIOLOGY | Facility: HOSPITAL | Age: 78
End: 2023-02-17
Attending: UROLOGY
Payer: MEDICARE

## 2023-02-17 ENCOUNTER — HOSPITAL ENCOUNTER (OUTPATIENT)
Facility: HOSPITAL | Age: 78
Setting detail: HOSPITAL OUTPATIENT SURGERY
Discharge: HOME | End: 2023-02-17
Attending: UROLOGY | Admitting: UROLOGY
Payer: MEDICARE

## 2023-02-17 ENCOUNTER — ANESTHESIA EVENT (OUTPATIENT)
Dept: SURGERY | Facility: HOSPITAL | Age: 78
End: 2023-02-17
Payer: MEDICARE

## 2023-02-17 ENCOUNTER — ANESTHESIA (OUTPATIENT)
Dept: SURGERY | Facility: HOSPITAL | Age: 78
End: 2023-02-17
Payer: MEDICARE

## 2023-02-17 VITALS
DIASTOLIC BLOOD PRESSURE: 44 MMHG | HEIGHT: 58 IN | RESPIRATION RATE: 16 BRPM | HEART RATE: 62 BPM | WEIGHT: 147 LBS | BODY MASS INDEX: 30.86 KG/M2 | SYSTOLIC BLOOD PRESSURE: 112 MMHG | OXYGEN SATURATION: 97 % | TEMPERATURE: 98 F

## 2023-02-17 DIAGNOSIS — Z01.818 PREOPERATIVE EXAMINATION: Primary | ICD-10-CM

## 2023-02-17 LAB
GLUCOSE BLDC GLUCOMTR-MCNC: 116 MG/DL (ref 70–99)
GLUCOSE BLDC GLUCOMTR-MCNC: 80 MG/DL (ref 70–99)

## 2023-02-17 PROCEDURE — 88305 TISSUE EXAM BY PATHOLOGIST: CPT | Performed by: UROLOGY

## 2023-02-17 PROCEDURE — 82962 GLUCOSE BLOOD TEST: CPT

## 2023-02-17 PROCEDURE — 0TB08ZX EXCISION OF RIGHT KIDNEY, VIA NATURAL OR ARTIFICIAL OPENING ENDOSCOPIC, DIAGNOSTIC: ICD-10-PCS | Performed by: UROLOGY

## 2023-02-17 RX ORDER — NICOTINE POLACRILEX 4 MG
30 LOZENGE BUCCAL
Status: DISCONTINUED | OUTPATIENT
Start: 2023-02-17 | End: 2023-02-17

## 2023-02-17 RX ORDER — CEFAZOLIN SODIUM/WATER 2 G/20 ML
2 SYRINGE (ML) INTRAVENOUS EVERY 8 HOURS
Status: DISCONTINUED | OUTPATIENT
Start: 2023-02-17 | End: 2023-02-17

## 2023-02-17 RX ORDER — NALOXONE HYDROCHLORIDE 0.4 MG/ML
80 INJECTION, SOLUTION INTRAMUSCULAR; INTRAVENOUS; SUBCUTANEOUS AS NEEDED
Status: DISCONTINUED | OUTPATIENT
Start: 2023-02-17 | End: 2023-02-17

## 2023-02-17 RX ORDER — ONDANSETRON 2 MG/ML
INJECTION INTRAMUSCULAR; INTRAVENOUS AS NEEDED
Status: DISCONTINUED | OUTPATIENT
Start: 2023-02-17 | End: 2023-02-17 | Stop reason: SURG

## 2023-02-17 RX ORDER — SODIUM CHLORIDE, SODIUM LACTATE, POTASSIUM CHLORIDE, CALCIUM CHLORIDE 600; 310; 30; 20 MG/100ML; MG/100ML; MG/100ML; MG/100ML
INJECTION, SOLUTION INTRAVENOUS CONTINUOUS
Status: DISCONTINUED | OUTPATIENT
Start: 2023-02-17 | End: 2023-02-17

## 2023-02-17 RX ORDER — METOCLOPRAMIDE 10 MG/1
10 TABLET ORAL ONCE
Status: COMPLETED | OUTPATIENT
Start: 2023-02-17 | End: 2023-02-17

## 2023-02-17 RX ORDER — DOCUSATE SODIUM 100 MG/1
100 CAPSULE, LIQUID FILLED ORAL 2 TIMES DAILY PRN
Qty: 30 CAPSULE | Refills: 0 | Status: SHIPPED | OUTPATIENT
Start: 2023-02-17

## 2023-02-17 RX ORDER — LIDOCAINE HYDROCHLORIDE 10 MG/ML
INJECTION, SOLUTION EPIDURAL; INFILTRATION; INTRACAUDAL; PERINEURAL AS NEEDED
Status: DISCONTINUED | OUTPATIENT
Start: 2023-02-17 | End: 2023-02-17 | Stop reason: SURG

## 2023-02-17 RX ORDER — ACETAMINOPHEN 500 MG
1000 TABLET ORAL ONCE
Status: COMPLETED | OUTPATIENT
Start: 2023-02-17 | End: 2023-02-17

## 2023-02-17 RX ORDER — DEXAMETHASONE SODIUM PHOSPHATE 4 MG/ML
VIAL (ML) INJECTION AS NEEDED
Status: DISCONTINUED | OUTPATIENT
Start: 2023-02-17 | End: 2023-02-17 | Stop reason: SURG

## 2023-02-17 RX ORDER — MORPHINE SULFATE 10 MG/ML
6 INJECTION, SOLUTION INTRAMUSCULAR; INTRAVENOUS EVERY 10 MIN PRN
Status: DISCONTINUED | OUTPATIENT
Start: 2023-02-17 | End: 2023-02-17

## 2023-02-17 RX ORDER — NICOTINE POLACRILEX 4 MG
15 LOZENGE BUCCAL
Status: DISCONTINUED | OUTPATIENT
Start: 2023-02-17 | End: 2023-02-17 | Stop reason: HOSPADM

## 2023-02-17 RX ORDER — HYDROMORPHONE HYDROCHLORIDE 1 MG/ML
0.2 INJECTION, SOLUTION INTRAMUSCULAR; INTRAVENOUS; SUBCUTANEOUS EVERY 5 MIN PRN
Status: DISCONTINUED | OUTPATIENT
Start: 2023-02-17 | End: 2023-02-17

## 2023-02-17 RX ORDER — DEXTROSE MONOHYDRATE 25 G/50ML
50 INJECTION, SOLUTION INTRAVENOUS
Status: DISCONTINUED | OUTPATIENT
Start: 2023-02-17 | End: 2023-02-17 | Stop reason: HOSPADM

## 2023-02-17 RX ORDER — FAMOTIDINE 20 MG/1
20 TABLET, FILM COATED ORAL ONCE
Status: DISCONTINUED | OUTPATIENT
Start: 2023-02-17 | End: 2023-02-17 | Stop reason: HOSPADM

## 2023-02-17 RX ORDER — HYDROMORPHONE HYDROCHLORIDE 1 MG/ML
0.6 INJECTION, SOLUTION INTRAMUSCULAR; INTRAVENOUS; SUBCUTANEOUS EVERY 5 MIN PRN
Status: DISCONTINUED | OUTPATIENT
Start: 2023-02-17 | End: 2023-02-17

## 2023-02-17 RX ORDER — MORPHINE SULFATE 4 MG/ML
2 INJECTION, SOLUTION INTRAMUSCULAR; INTRAVENOUS EVERY 10 MIN PRN
Status: DISCONTINUED | OUTPATIENT
Start: 2023-02-17 | End: 2023-02-17

## 2023-02-17 RX ORDER — PHENYLEPHRINE HCL 10 MG/ML
VIAL (ML) INJECTION AS NEEDED
Status: DISCONTINUED | OUTPATIENT
Start: 2023-02-17 | End: 2023-02-17 | Stop reason: SURG

## 2023-02-17 RX ORDER — NICOTINE POLACRILEX 4 MG
15 LOZENGE BUCCAL
Status: DISCONTINUED | OUTPATIENT
Start: 2023-02-17 | End: 2023-02-17

## 2023-02-17 RX ORDER — MORPHINE SULFATE 4 MG/ML
4 INJECTION, SOLUTION INTRAMUSCULAR; INTRAVENOUS EVERY 10 MIN PRN
Status: DISCONTINUED | OUTPATIENT
Start: 2023-02-17 | End: 2023-02-17

## 2023-02-17 RX ORDER — FAMOTIDINE 20 MG/1
20 TABLET, FILM COATED ORAL ONCE
Status: COMPLETED | OUTPATIENT
Start: 2023-02-17 | End: 2023-02-17

## 2023-02-17 RX ORDER — HYDROCODONE BITARTRATE AND ACETAMINOPHEN 5; 325 MG/1; MG/1
1-2 TABLET ORAL EVERY 4 HOURS PRN
Qty: 1 TABLET | Refills: 0 | Status: SHIPPED | OUTPATIENT
Start: 2023-02-17

## 2023-02-17 RX ORDER — ACETAMINOPHEN 500 MG
1000 TABLET ORAL ONCE
Status: DISCONTINUED | OUTPATIENT
Start: 2023-02-17 | End: 2023-02-17 | Stop reason: HOSPADM

## 2023-02-17 RX ORDER — HYDROMORPHONE HYDROCHLORIDE 1 MG/ML
0.4 INJECTION, SOLUTION INTRAMUSCULAR; INTRAVENOUS; SUBCUTANEOUS EVERY 5 MIN PRN
Status: DISCONTINUED | OUTPATIENT
Start: 2023-02-17 | End: 2023-02-17

## 2023-02-17 RX ORDER — NICOTINE POLACRILEX 4 MG
30 LOZENGE BUCCAL
Status: DISCONTINUED | OUTPATIENT
Start: 2023-02-17 | End: 2023-02-17 | Stop reason: HOSPADM

## 2023-02-17 RX ORDER — METOCLOPRAMIDE 10 MG/1
10 TABLET ORAL ONCE
Status: DISCONTINUED | OUTPATIENT
Start: 2023-02-17 | End: 2023-02-17 | Stop reason: HOSPADM

## 2023-02-17 RX ORDER — DEXTROSE MONOHYDRATE 25 G/50ML
50 INJECTION, SOLUTION INTRAVENOUS
Status: DISCONTINUED | OUTPATIENT
Start: 2023-02-17 | End: 2023-02-17

## 2023-02-17 RX ADMIN — SODIUM CHLORIDE, SODIUM LACTATE, POTASSIUM CHLORIDE, CALCIUM CHLORIDE: 600; 310; 30; 20 INJECTION, SOLUTION INTRAVENOUS at 16:32:00

## 2023-02-17 RX ADMIN — CEFAZOLIN SODIUM/WATER 2 G: 2 G/20 ML SYRINGE (ML) INTRAVENOUS at 16:11:00

## 2023-02-17 RX ADMIN — SODIUM CHLORIDE, SODIUM LACTATE, POTASSIUM CHLORIDE, CALCIUM CHLORIDE: 600; 310; 30; 20 INJECTION, SOLUTION INTRAVENOUS at 16:02:00

## 2023-02-17 RX ADMIN — ONDANSETRON 4 MG: 2 INJECTION INTRAMUSCULAR; INTRAVENOUS at 16:13:00

## 2023-02-17 RX ADMIN — PHENYLEPHRINE HCL 100 MCG: 10 MG/ML VIAL (ML) INJECTION at 16:19:00

## 2023-02-17 RX ADMIN — PHENYLEPHRINE HCL 100 MCG: 10 MG/ML VIAL (ML) INJECTION at 16:26:00

## 2023-02-17 RX ADMIN — LIDOCAINE HYDROCHLORIDE 50 MG: 10 INJECTION, SOLUTION EPIDURAL; INFILTRATION; INTRACAUDAL; PERINEURAL at 16:07:00

## 2023-02-17 RX ADMIN — DEXAMETHASONE SODIUM PHOSPHATE 4 MG: 4 MG/ML VIAL (ML) INJECTION at 16:13:00

## 2023-02-17 NOTE — ANESTHESIA PROCEDURE NOTES
Airway  Date/Time: 2/17/2023 4:08 PM  Urgency: Elective    Airway not difficult    General Information and Staff    Patient location during procedure: OR  Anesthesiologist: Marcie Oliva MD  Resident/CRNA: lEsie Thorne CRNA  Performed: anesthesiologist and CRNA   Performed by: Elsie Thorne CRNA  Authorized by: Marcie Oliva MD      Indications and Patient Condition  Indications for airway management: anesthesia  Sedation level: deep  Preoxygenated: yes  Patient position: sniffing  Mask difficulty assessment: 0 - not attempted    Final Airway Details  Final airway type: supraglottic airway      Successful airway: classic  Size 4  Airway Seal Pressure (cm H2O): 20      Number of attempts at approach: 1    Additional Comments  Atraumatic.  Gauze bite block

## 2023-02-17 NOTE — BRIEF OP NOTE
Pre-Operative Diagnosis: Urothelial carcinoma of the right kidney     Post-Operative Diagnosis: Urothelial carcinoma of the right kidney     Procedure Performed:   Cystoscopy, right retrograde pyelogram, right ureteroscopy, biopsy, laser ablation    Surgeon(s) and Role:     Varinder Burks DO - Primary    Assistant(s):   None     Surgical Findings: No obvious tumor.  Right upper pole scar biopsied     Specimen: Right renal calyx biopsy     Estimated Blood Loss: Minimal    Dictation Number:  [id number]    Dilcia Michele DO  2/17/2023  4:38 PM

## 2023-02-18 NOTE — OPERATIVE REPORT
[facility]    PATIENT'S NAME: Reji Route   ATTENDING PHYSICIAN: Mirtha Scales. Karsten Sun DO   OPERATING PHYSICIAN: Mirtha Scales. Karsten Sun DO   PATIENT ACCOUNT#:   [de-identified]    LOCATION:  [facility] 1 EMHP 10  MEDICAL RECORD #:   [id number]       YOB: 1945  ADMISSION DATE:       02/17/2023      OPERATION DATE:  02/17/2023    OPERATIVE REPORT    PREOPERATIVE DIAGNOSIS:  Urothelial carcinoma of the right kidney. POSTOPERATIVE DIAGNOSIS:  Urothelial carcinoma of the right kidney. PROCEDURE:  Cystoscopy, right retrograde pyelogram, right ureteroscopy, right upper pole calyx biopsy, and laser ablation. ASSISTANT:  None. ANESTHESIA:  General.    ESTIMATED BLOOD LOSS:  Minimal.    INTRAVENOUS FLUIDS:  See Anesthesia notes. URINE OUTPUT:  Not recorded. DRAINS:  None. SPECIMENS:  Right upper pole biopsy. COMPLICATIONS:  None. DISPOSITION:  Stable to the recovery room. 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 She is here today for surveillance ureteroscopy. Risks included but not limited to bleeding, infection, pain, ureteral injury, failure to remove all tumor, need for further procedure, and stent-associated complications were discussed at length. All good and pertinent questions were answered. Informed consent was obtained. FINDINGS:  Normal appearing urethra. Blader without evidence of tumor, stone, or mucosal abnormality. Bilateral ureteral orifices in normal anatomic position with clear efflux of urine. Right retrograde pyelogram with a normal caliber ureter. There were no filling defects in the course of the ureter or within the collecting system. There was no hydronephrosis on uteroscopic evaluation. No urothelial abnormalities. Within the calyces, there were no papillary tumors noted.   In the upper pole, there did appear to be a scar from prior resection; however, no viable tumor was identified. We did take some biopsies from the scar. OPERATIVE TECHNIQUE:  Patient was seen in the preoperative holding area. The site was reviewed. She was taken to the operative suite and placed on the table in supine position. SCDs were applied. General anesthetic and IV antibiotics were administered by the Anesthesia Service. After allowing the adequate time for the anesthetic to take effect, the patient was placed in the dorsal lithotomy position. The perineum and genitalia were prepped and draped in the usual sterile fashion, and a time-out was performed. A 22-Emirati cystoscope with 30 degree lens was advanced into the bladder. Bladder was inspected in its entirety with the results as above. Our attention was turned to the right ureteral orifice. It was cannulated using a 6-Emirati open ureteral catheter. Retrograde pyelogram performed with the results as above. We advanced a Sensor wire into the collecting system. A second wire was advanced in a similar fashion using a dual lumen catheter. Over one of the wires, I advanced a flexible ureteroscope into the collecting system under fluoroscopic guidance. The collecting system was inspected in its entirety with the results as above. We used the Piranha biopsy forceps to obtain specimen from the upper pole. Only a small amount of bleeding was noted. We used the 200 micron Holmium laser fiber for ablation and hemostasis. There was no evidence of any active bleeding. We removed the scope under direct visualization. No evidence of any ureteral injury or ureteral mucosal abnormality. We then removed the wire. The bladder was then drained. Cystoscope removed. The patient tolerated the procedure well, and there were no complications. Dictated By Patsy Soto DO  d: 02/17/2023 16:42:00  t: 02/17/2023 20:12:06  [facility] [id number]  XGY/

## 2023-03-27 ENCOUNTER — TELEPHONE (OUTPATIENT)
Dept: INTERNAL MEDICINE CLINIC | Facility: CLINIC | Age: 78
End: 2023-03-27

## 2023-03-27 DIAGNOSIS — I50.9 HEART FAILURE, UNSPECIFIED HF CHRONICITY, UNSPECIFIED HEART FAILURE TYPE (HCC): ICD-10-CM

## 2023-03-27 RX ORDER — LISINOPRIL AND HYDROCHLOROTHIAZIDE 25; 20 MG/1; MG/1
TABLET ORAL
Qty: 90 TABLET | Refills: 0 | Status: SHIPPED | OUTPATIENT
Start: 2023-03-27

## 2023-03-27 NOTE — TELEPHONE ENCOUNTER
Patient is calling in stating her last refill, from about a month ago 3/6 was Lisinopril- Hydrochlorothiazide 5 mg not the Lisinopril- Hydrochlorothiazide 20-25 mg she has been receiving. Patient states pain in stomach has not gone away, it traveled to her back. Patient would like clarification about change in dosage. Patient also asking if she should continue to take metoprolol.

## 2023-03-27 NOTE — TELEPHONE ENCOUNTER
Please review. Per last note she was taking lisinopril 5mg. Do not see lisinopril/hydrochlorothiazide 20/25mg in chart. Should make appt to discuss stomach pain?

## 2023-03-27 NOTE — TELEPHONE ENCOUNTER
Ghazal Madhavi can you have her check her pills and ask her which blood pressure medications she is taking? All I have is what is in our system - lisinopril 5 mg daily, metoprolol tartrate 25 mg twice daily. Perhaps the cardiologist adjusted the medication? Or perhaps it was changed in the hospital? Happy to refill what she is taking at this time as her blood pressures are well controlled. If abdominal pain is bothersome she can see me. But may need to see her urologist as this might be related to her procedure?     Thanks,  Kelsy Fuentes

## 2023-03-28 ENCOUNTER — MED REC SCAN ONLY (OUTPATIENT)
Dept: INTERNAL MEDICINE CLINIC | Facility: CLINIC | Age: 78
End: 2023-03-28

## 2023-03-28 NOTE — TELEPHONE ENCOUNTER
Contacted pt and reviewed medications. Per pt has been taking Lisinopril/hydrochlorothiazide 20/25mg as previously rx'ed by Dr. Justin Kunz & Metoprolol 25 mg BID. Not taking lisinopril 5mg. Per pt BP is controlled at this time and wishes to continue meds. Combo Bp med was sent to pharmacy yesterday as it passed protocol. Needing refill on Metoprolol (will send). Re: stomach issues-->has made appt for urologist but soonest appt was 09/2023. Pt will make some lifestyle/eating changes and will make appt with our office if not improved.      Kulwant Silver

## 2023-04-07 DIAGNOSIS — E11.9 TYPE 2 DIABETES MELLITUS WITHOUT COMPLICATION, WITHOUT LONG-TERM CURRENT USE OF INSULIN (HCC): ICD-10-CM

## 2023-04-14 DIAGNOSIS — K21.9 GASTROESOPHAGEAL REFLUX DISEASE, UNSPECIFIED WHETHER ESOPHAGITIS PRESENT: ICD-10-CM

## 2023-04-14 RX ORDER — PANTOPRAZOLE SODIUM 40 MG/1
TABLET, DELAYED RELEASE ORAL
Qty: 90 TABLET | Refills: 0 | Status: SHIPPED | OUTPATIENT
Start: 2023-04-14

## 2023-04-24 DIAGNOSIS — E11.9 TYPE 2 DIABETES MELLITUS WITHOUT COMPLICATION, WITHOUT LONG-TERM CURRENT USE OF INSULIN (HCC): ICD-10-CM

## 2023-05-03 DIAGNOSIS — I50.9 HEART FAILURE, UNSPECIFIED HF CHRONICITY, UNSPECIFIED HEART FAILURE TYPE (HCC): ICD-10-CM

## 2023-06-26 RX ORDER — LISINOPRIL AND HYDROCHLOROTHIAZIDE 25; 20 MG/1; MG/1
1 TABLET ORAL DAILY
Qty: 90 TABLET | Refills: 0 | Status: SHIPPED | OUTPATIENT
Start: 2023-06-26

## 2023-06-29 DIAGNOSIS — R07.9 CHEST PAIN, UNSPECIFIED TYPE: Primary | ICD-10-CM

## 2023-07-19 DIAGNOSIS — I10 ESSENTIAL HYPERTENSION: Primary | ICD-10-CM

## 2023-07-19 DIAGNOSIS — E11.3293 TYPE 2 DIABETES MELLITUS WITH BOTH EYES AFFECTED BY MILD NONPROLIFERATIVE RETINOPATHY WITHOUT MACULAR EDEMA, WITHOUT LONG-TERM CURRENT USE OF INSULIN (HCC): ICD-10-CM

## 2023-07-19 RX ORDER — SITAGLIPTIN 100 MG/1
100 TABLET, FILM COATED ORAL DAILY
Qty: 90 TABLET | Refills: 1 | Status: SHIPPED | OUTPATIENT
Start: 2023-07-19

## 2023-07-19 RX ORDER — LISINOPRIL AND HYDROCHLOROTHIAZIDE 25; 20 MG/1; MG/1
1 TABLET ORAL DAILY
Qty: 90 TABLET | Refills: 1 | Status: SHIPPED | OUTPATIENT
Start: 2023-07-19

## 2023-07-19 NOTE — TELEPHONE ENCOUNTER
A refill request was received for:  Requested Prescriptions     Pending Prescriptions Disp Refills    JANUVIA 100 MG Oral Tab [Pharmacy Med Name: Christiano Hearing 100MG TABLETS] 90 tablet 1     Sig: TAKE 1 TABLET(100 MG) BY MOUTH DAILY    LISINOPRIL-HYDROCHLOROTHIAZIDE 20-25 MG Oral Tab [Pharmacy Med Name: LISINOPRIL-HCTZ 20/25MG TABLETS] 90 tablet 0     Sig: TAKE 1 TABLET BY MOUTH DAILY     Last refill date:  6/26/23    Last office visit: 2/6/23      Future Appointments   Date Time Provider Gala Colon   7/21/2023  9:00 AM [address] [place] [address] [place]   8/1/2023 12:30 PM [address] [place] [address] [place]   8/7/2023  9:00 AM MD LINDA Dunaway [address]   9/8/2023  9:30 AM Earlean Harada, MD [facility]

## 2023-07-21 ENCOUNTER — HOSPITAL ENCOUNTER (OUTPATIENT)
Dept: CT IMAGING | Facility: HOSPITAL | Age: 78
Discharge: HOME | End: 2023-07-21
Attending: INTERNAL MEDICINE
Payer: MEDICARE

## 2023-07-21 VITALS
WEIGHT: 141 LBS | BODY MASS INDEX: 29.6 KG/M2 | DIASTOLIC BLOOD PRESSURE: 48 MMHG | HEART RATE: 68 BPM | SYSTOLIC BLOOD PRESSURE: 116 MMHG | HEIGHT: 58 IN

## 2023-07-21 DIAGNOSIS — R07.9 CHEST PAIN, UNSPECIFIED TYPE: ICD-10-CM

## 2023-07-21 LAB
CREAT BLD-MCNC: 1 MG/DL
EGFRCR SERPLBLD CKD-EPI 2021: 59 ML/MIN/1.73M2 (ref 60–?)

## 2023-07-21 PROCEDURE — 82565 ASSAY OF CREATININE: CPT

## 2023-07-21 PROCEDURE — 75574 CT ANGIO HRT W/3D IMAGE: CPT | Performed by: INTERNAL MEDICINE

## 2023-07-21 RX ORDER — METOPROLOL TARTRATE 5 MG/5ML
5 INJECTION INTRAVENOUS SEE ADMIN INSTRUCTIONS
Status: DISCONTINUED | OUTPATIENT
Start: 2023-07-21 | End: 2023-07-23

## 2023-07-21 RX ORDER — METOPROLOL TARTRATE 5 MG/5ML
INJECTION INTRAVENOUS
Status: DISPENSED
Start: 2023-07-21 | End: 2023-07-21

## 2023-07-21 RX ORDER — NITROGLYCERIN 0.4 MG/1
0.4 TABLET SUBLINGUAL ONCE
Status: COMPLETED | OUTPATIENT
Start: 2023-07-21 | End: 2023-07-21

## 2023-07-21 RX ORDER — DILTIAZEM HYDROCHLORIDE 5 MG/ML
5 INJECTION INTRAVENOUS SEE ADMIN INSTRUCTIONS
Status: DISCONTINUED | OUTPATIENT
Start: 2023-07-21 | End: 2023-07-23

## 2023-07-21 RX ORDER — METOPROLOL TARTRATE 5 MG/5ML
INJECTION INTRAVENOUS
Status: DISCONTINUED
Start: 2023-07-21 | End: 2023-07-21 | Stop reason: WASHOUT

## 2023-07-21 RX ADMIN — NITROGLYCERIN 0.4 MG: 0.4 TABLET SUBLINGUAL at 10:05:00

## 2023-07-21 RX ADMIN — Medication 50 MG: at 08:55:00

## 2023-07-21 RX ADMIN — METOPROLOL TARTRATE 5 MG: 5 INJECTION INTRAVENOUS at 10:00:00

## 2023-07-21 NOTE — IMAGING NOTE
TO RAD HOLDING AT 0843    HX TAKEN : CHEST PAIN/SOB    0850: BASELINE VITAL SIGNS   HR 68  /48 BMI 29.5/WT 141lb    PT CONSENTED AT 0854     CTA ORDERED BY DR SHERMAN. WAS PT GIVEN CTA  PREMEDS:  NO    METOPROLOL PO GIVEN 50 MILLIGRAMS  AT 0855    18 GAUGE IV STARTED AT 0915. POC TESTING COMPLETED GFR =  59  CREATINE = 1.0    0944: HR 58 /47    TO CT TABLE @ 0953    0958: CONNECT TO MONITOR  VS: HR 63 /40    1000: METOPROLOL 5 MILLIGRAMS GIVEN IV PUSH  SEE  PROTOCOL    1005: HR 59 /32    NITROGLYCERIN 0.4 MILLIGRAMS SUBLINGUAL GIVEN AT 1005     CALCIUM SCORE COMPLETED AT 1010     INJECTION STARTED AT 1016 HR 59 DURING SCAN. PROCEDURE COMPLETE    POST SCAN VS: HR 69 /44 AT 1018    1025: PT TO HOLDING AREA.   VS:  Q 15 MIN X2   1026: HR 69 /60  1045: HR 68 /47    AVS  PROVIDED   1105: DISCHARGED HOME

## 2023-08-07 ENCOUNTER — OFFICE VISIT (OUTPATIENT)
Dept: INTERNAL MEDICINE CLINIC | Facility: CLINIC | Age: 78
End: 2023-08-07
Payer: COMMERCIAL

## 2023-08-07 VITALS
HEIGHT: 58 IN | SYSTOLIC BLOOD PRESSURE: 128 MMHG | DIASTOLIC BLOOD PRESSURE: 68 MMHG | RESPIRATION RATE: 15 BRPM | HEART RATE: 75 BPM | WEIGHT: 142 LBS | OXYGEN SATURATION: 97 % | BODY MASS INDEX: 29.81 KG/M2

## 2023-08-07 DIAGNOSIS — K74.60 CIRRHOSIS OF LIVER WITHOUT ASCITES, UNSPECIFIED HEPATIC CIRRHOSIS TYPE (HCC): ICD-10-CM

## 2023-08-07 DIAGNOSIS — I50.9 HEART FAILURE, UNSPECIFIED HF CHRONICITY, UNSPECIFIED HEART FAILURE TYPE (HCC): ICD-10-CM

## 2023-08-07 DIAGNOSIS — N18.31 STAGE 3A CHRONIC KIDNEY DISEASE (HCC): Chronic | ICD-10-CM

## 2023-08-07 DIAGNOSIS — E78.5 HYPERLIPIDEMIA, UNSPECIFIED HYPERLIPIDEMIA TYPE: ICD-10-CM

## 2023-08-07 DIAGNOSIS — E11.3293 TYPE 2 DIABETES MELLITUS WITH BOTH EYES AFFECTED BY MILD NONPROLIFERATIVE RETINOPATHY WITHOUT MACULAR EDEMA, WITHOUT LONG-TERM CURRENT USE OF INSULIN (HCC): Primary | ICD-10-CM

## 2023-08-07 DIAGNOSIS — H26.9 CATARACT OF BOTH EYES, UNSPECIFIED CATARACT TYPE: ICD-10-CM

## 2023-08-07 DIAGNOSIS — F32.4 MAJOR DEPRESSIVE DISORDER WITH SINGLE EPISODE, IN PARTIAL REMISSION (HCC): ICD-10-CM

## 2023-08-07 DIAGNOSIS — H40.9 GLAUCOMA OF BOTH EYES, UNSPECIFIED GLAUCOMA TYPE: ICD-10-CM

## 2023-08-07 DIAGNOSIS — E66.3 OVERWEIGHT (BMI 25.0-29.9): ICD-10-CM

## 2023-08-07 DIAGNOSIS — C68.9 UROTHELIAL CARCINOMA (HCC): ICD-10-CM

## 2023-08-07 DIAGNOSIS — K21.9 GASTROESOPHAGEAL REFLUX DISEASE, UNSPECIFIED WHETHER ESOPHAGITIS PRESENT: ICD-10-CM

## 2023-08-07 DIAGNOSIS — I10 ESSENTIAL HYPERTENSION: ICD-10-CM

## 2023-08-07 LAB — HEMOGLOBIN A1C: 5.8 % (ref 4.3–5.6)

## 2023-08-07 PROCEDURE — 99214 OFFICE O/P EST MOD 30 MIN: CPT | Performed by: FAMILY MEDICINE

## 2023-08-07 PROCEDURE — 1159F MED LIST DOCD IN RCRD: CPT | Performed by: FAMILY MEDICINE

## 2023-08-07 PROCEDURE — 3078F DIAST BP <80 MM HG: CPT | Performed by: FAMILY MEDICINE

## 2023-08-07 PROCEDURE — 3074F SYST BP LT 130 MM HG: CPT | Performed by: FAMILY MEDICINE

## 2023-08-07 PROCEDURE — 1160F RVW MEDS BY RX/DR IN RCRD: CPT | Performed by: FAMILY MEDICINE

## 2023-08-07 PROCEDURE — 3008F BODY MASS INDEX DOCD: CPT | Performed by: FAMILY MEDICINE

## 2023-08-07 NOTE — ASSESSMENT & PLAN NOTE
Diabetes overall controlled. Continue metformin at 1000 mg twice daily, continue Sitagliptin 100 mg daily. Saw ophthalmology-need to acquire records.   Urine microalbumin

## 2023-08-07 NOTE — ASSESSMENT & PLAN NOTE
Hypertension well-controlled at this time. Continue lisinopril hydrochlorothiazide 20-25 mg daily, metoprolol tartrate 25 mg daily.

## 2023-08-07 NOTE — PATIENT INSTRUCTIONS
PATIENT INSTRUCTIONS    Thank you for seeing me today, it was a pleasure taking care of you. Please check out at the  and schedule a follow up appointment. Return in about 6 months (around 2/7/2024) for medicare visit. See urology, cardiology, eye  When you see Dr Corey Underwood, have him fax over eye report  Please have the doctor fax his/her note and examination to our office at [phone].     Best,  Dr. Brenda Leavitt

## 2023-08-07 NOTE — ASSESSMENT & PLAN NOTE
Mood is improved at this time. Notes that she has good support with her friends at this time. Discussed if needed, can consider therapy. Follow-up as needed.

## 2023-08-07 NOTE — ASSESSMENT & PLAN NOTE
Overall she reports breathing and pain are controlled. Following with cardiology. Doing well. Had recent stress test in 12/2022. Using isosorbide as needed. For now continue lisinopril hydrochlorothiazide 20-25 mg daily, metoprolol tartrate 25 mg daily.

## 2023-08-08 LAB
CREATININE, RANDOM URINE: 50 MG/DL (ref 20–275)
MICROALBUMIN/CREATININE RATIO, RANDOM URINE: 14 MCG/MG CREAT
MICROALBUMIN: 0.7 MG/DL

## 2023-08-24 ENCOUNTER — HOSPITAL ENCOUNTER (OUTPATIENT)
Dept: CT IMAGING | Facility: HOSPITAL | Age: 78
Discharge: HOME | End: 2023-08-24
Attending: UROLOGY
Payer: MEDICARE

## 2023-08-24 DIAGNOSIS — C68.9 UROTHELIAL CARCINOMA (HCC): ICD-10-CM

## 2023-08-24 LAB
CREAT BLD-MCNC: 1.1 MG/DL
EGFRCR SERPLBLD CKD-EPI 2021: 52 ML/MIN/1.73M2 (ref 60–?)

## 2023-08-24 PROCEDURE — 74178 CT ABD&PLV WO CNTR FLWD CNTR: CPT | Performed by: UROLOGY

## 2023-08-24 PROCEDURE — 82565 ASSAY OF CREATININE: CPT

## 2023-09-08 ENCOUNTER — OFFICE VISIT (OUTPATIENT)
Dept: GASTROENTEROLOGY | Facility: CLINIC | Age: 78
End: 2023-09-08

## 2023-09-08 VITALS
WEIGHT: 139.63 LBS | SYSTOLIC BLOOD PRESSURE: 99 MMHG | BODY MASS INDEX: 29.31 KG/M2 | DIASTOLIC BLOOD PRESSURE: 66 MMHG | HEIGHT: 58 IN | HEART RATE: 68 BPM

## 2023-09-08 DIAGNOSIS — K74.60 CIRRHOSIS OF LIVER WITHOUT ASCITES, UNSPECIFIED HEPATIC CIRRHOSIS TYPE (HCC): Primary | ICD-10-CM

## 2023-09-08 DIAGNOSIS — K21.9 GASTROESOPHAGEAL REFLUX DISEASE, UNSPECIFIED WHETHER ESOPHAGITIS PRESENT: ICD-10-CM

## 2023-09-08 PROCEDURE — 1159F MED LIST DOCD IN RCRD: CPT | Performed by: INTERNAL MEDICINE

## 2023-09-08 PROCEDURE — 3074F SYST BP LT 130 MM HG: CPT | Performed by: INTERNAL MEDICINE

## 2023-09-08 PROCEDURE — 99214 OFFICE O/P EST MOD 30 MIN: CPT | Performed by: INTERNAL MEDICINE

## 2023-09-08 PROCEDURE — 1126F AMNT PAIN NOTED NONE PRSNT: CPT | Performed by: INTERNAL MEDICINE

## 2023-09-08 PROCEDURE — 3008F BODY MASS INDEX DOCD: CPT | Performed by: INTERNAL MEDICINE

## 2023-09-08 PROCEDURE — 3078F DIAST BP <80 MM HG: CPT | Performed by: INTERNAL MEDICINE

## 2023-09-08 RX ORDER — LATANOPROST 50 UG/ML
SOLUTION/ DROPS OPHTHALMIC NIGHTLY
COMMUNITY
Start: 2023-08-28

## 2023-09-08 RX ORDER — ISOSORBIDE MONONITRATE 30 MG/1
30 TABLET, EXTENDED RELEASE ORAL DAILY
COMMUNITY
Start: 2023-07-20

## 2023-09-08 RX ORDER — PANTOPRAZOLE SODIUM 40 MG/1
40 TABLET, DELAYED RELEASE ORAL
Qty: 90 TABLET | Refills: 3 | Status: SHIPPED | OUTPATIENT
Start: 2023-09-08 | End: 2023-12-07

## 2023-09-08 RX ORDER — BIMATOPROST 0.1 MG/ML
1 SOLUTION/ DROPS OPHTHALMIC NIGHTLY
COMMUNITY
Start: 2023-08-29

## 2023-10-09 ENCOUNTER — TELEPHONE (OUTPATIENT)
Dept: INTERNAL MEDICINE CLINIC | Facility: CLINIC | Age: 78
End: 2023-10-09

## 2023-10-09 DIAGNOSIS — Z01.818 PREOPERATIVE CLEARANCE: Primary | ICD-10-CM

## 2023-10-09 NOTE — TELEPHONE ENCOUNTER
Deshaun with plans for surgery with urology. Surgery is 10/20/23. Please have patient see me ASAP for clearance. Please also have patient obtain clearance from her cardiologist. This all needs to be done ASAP. Please facilitate. Thanks!  Alice Smith

## 2023-10-10 ENCOUNTER — LAB ENCOUNTER (OUTPATIENT)
Dept: LAB | Age: 78
End: 2023-10-10
Attending: FAMILY MEDICINE
Payer: MEDICARE

## 2023-10-10 ENCOUNTER — EKG ENCOUNTER (OUTPATIENT)
Dept: LAB | Age: 78
End: 2023-10-10
Attending: FAMILY MEDICINE
Payer: MEDICARE

## 2023-10-10 DIAGNOSIS — Z01.818 PREOPERATIVE CLEARANCE: ICD-10-CM

## 2023-10-10 LAB
ANION GAP SERPL CALC-SCNC: 9 MMOL/L (ref 0–18)
ATRIAL RATE: 62 BPM
BASOPHILS # BLD AUTO: 0.06 X10(3) UL (ref 0–0.2)
BASOPHILS NFR BLD AUTO: 0.7 %
BUN BLD-MCNC: 30 MG/DL (ref 7–18)
BUN/CREAT SERPL: 23.1 (ref 10–20)
CALCIUM BLD-MCNC: 9.6 MG/DL (ref 8.5–10.1)
CHLORIDE SERPL-SCNC: 104 MMOL/L (ref 98–112)
CO2 SERPL-SCNC: 27 MMOL/L (ref 21–32)
CREAT BLD-MCNC: 1.3 MG/DL
DEPRECATED RDW RBC AUTO: 46.7 FL (ref 35.1–46.3)
EGFRCR SERPLBLD CKD-EPI 2021: 43 ML/MIN/1.73M2 (ref 60–?)
EOSINOPHIL # BLD AUTO: 0.21 X10(3) UL (ref 0–0.7)
EOSINOPHIL NFR BLD AUTO: 2.5 %
ERYTHROCYTE [DISTWIDTH] IN BLOOD BY AUTOMATED COUNT: 14 % (ref 11–15)
FASTING STATUS PATIENT QL REPORTED: NO
GLUCOSE BLD-MCNC: 75 MG/DL (ref 70–99)
HCT VFR BLD AUTO: 36.6 %
HGB BLD-MCNC: 11.3 G/DL
IMM GRANULOCYTES # BLD AUTO: 0.03 X10(3) UL (ref 0–1)
IMM GRANULOCYTES NFR BLD: 0.4 %
LYMPHOCYTES # BLD AUTO: 3.12 X10(3) UL (ref 1–4)
LYMPHOCYTES NFR BLD AUTO: 37 %
MCH RBC QN AUTO: 27.8 PG (ref 26–34)
MCHC RBC AUTO-ENTMCNC: 30.9 G/DL (ref 31–37)
MCV RBC AUTO: 90.1 FL
MONOCYTES # BLD AUTO: 0.75 X10(3) UL (ref 0.1–1)
MONOCYTES NFR BLD AUTO: 8.9 %
NEUTROPHILS # BLD AUTO: 4.27 X10 (3) UL (ref 1.5–7.7)
NEUTROPHILS # BLD AUTO: 4.27 X10(3) UL (ref 1.5–7.7)
NEUTROPHILS NFR BLD AUTO: 50.5 %
OSMOLALITY SERPL CALC.SUM OF ELEC: 295 MOSM/KG (ref 275–295)
P AXIS: 21 DEGREES
P-R INTERVAL: 128 MS
PLATELET # BLD AUTO: 371 10(3)UL (ref 150–450)
POTASSIUM SERPL-SCNC: 4.7 MMOL/L (ref 3.5–5.1)
Q-T INTERVAL: 408 MS
QRS DURATION: 74 MS
QTC CALCULATION (BEZET): 414 MS
R AXIS: 12 DEGREES
RBC # BLD AUTO: 4.06 X10(6)UL
SODIUM SERPL-SCNC: 140 MMOL/L (ref 136–145)
T AXIS: 37 DEGREES
VENTRICULAR RATE: 62 BPM
WBC # BLD AUTO: 8.4 X10(3) UL (ref 4–11)

## 2023-10-10 PROCEDURE — 80048 BASIC METABOLIC PNL TOTAL CA: CPT | Performed by: FAMILY MEDICINE

## 2023-10-10 PROCEDURE — 36415 COLL VENOUS BLD VENIPUNCTURE: CPT | Performed by: FAMILY MEDICINE

## 2023-10-10 PROCEDURE — 85025 COMPLETE CBC W/AUTO DIFF WBC: CPT | Performed by: FAMILY MEDICINE

## 2023-10-10 PROCEDURE — 87086 URINE CULTURE/COLONY COUNT: CPT | Performed by: FAMILY MEDICINE

## 2023-10-10 PROCEDURE — 93005 ELECTROCARDIOGRAM TRACING: CPT

## 2023-10-10 PROCEDURE — 87088 URINE BACTERIA CULTURE: CPT | Performed by: FAMILY MEDICINE

## 2023-10-10 PROCEDURE — 87186 SC STD MICRODIL/AGAR DIL: CPT | Performed by: FAMILY MEDICINE

## 2023-10-10 PROCEDURE — 93010 ELECTROCARDIOGRAM REPORT: CPT | Performed by: STUDENT IN AN ORGANIZED HEALTH CARE EDUCATION/TRAINING PROGRAM

## 2023-10-12 DIAGNOSIS — N30.00 ACUTE CYSTITIS WITHOUT HEMATURIA: Primary | ICD-10-CM

## 2023-10-12 RX ORDER — NITROFURANTOIN 25; 75 MG/1; MG/1
100 CAPSULE ORAL 2 TIMES DAILY
Qty: 10 CAPSULE | Refills: 0 | Status: SHIPPED | OUTPATIENT
Start: 2023-10-12

## 2023-10-13 ENCOUNTER — HOSPITAL ENCOUNTER (EMERGENCY)
Facility: HOSPITAL | Age: 78
Discharge: HOME | End: 2023-10-13
Attending: EMERGENCY MEDICINE
Payer: MEDICARE

## 2023-10-13 ENCOUNTER — APPOINTMENT (OUTPATIENT)
Dept: GENERAL RADIOLOGY | Facility: HOSPITAL | Age: 78
End: 2023-10-13
Attending: EMERGENCY MEDICINE
Payer: MEDICARE

## 2023-10-13 ENCOUNTER — OFFICE VISIT (OUTPATIENT)
Dept: INTERNAL MEDICINE CLINIC | Facility: CLINIC | Age: 78
End: 2023-10-13
Payer: COMMERCIAL

## 2023-10-13 VITALS
OXYGEN SATURATION: 98 % | HEART RATE: 59 BPM | SYSTOLIC BLOOD PRESSURE: 123 MMHG | TEMPERATURE: 98 F | DIASTOLIC BLOOD PRESSURE: 76 MMHG | RESPIRATION RATE: 14 BRPM

## 2023-10-13 VITALS
HEIGHT: 58 IN | DIASTOLIC BLOOD PRESSURE: 72 MMHG | SYSTOLIC BLOOD PRESSURE: 114 MMHG | TEMPERATURE: 98 F | OXYGEN SATURATION: 98 % | WEIGHT: 138 LBS | BODY MASS INDEX: 28.97 KG/M2 | HEART RATE: 82 BPM

## 2023-10-13 DIAGNOSIS — H26.9 CATARACT OF BOTH EYES, UNSPECIFIED CATARACT TYPE: ICD-10-CM

## 2023-10-13 DIAGNOSIS — R07.89 CHEST PRESSURE: Primary | ICD-10-CM

## 2023-10-13 DIAGNOSIS — K74.60 CIRRHOSIS OF LIVER WITHOUT ASCITES, UNSPECIFIED HEPATIC CIRRHOSIS TYPE (HCC): ICD-10-CM

## 2023-10-13 DIAGNOSIS — H40.9 GLAUCOMA OF BOTH EYES, UNSPECIFIED GLAUCOMA TYPE: ICD-10-CM

## 2023-10-13 DIAGNOSIS — N18.31 STAGE 3A CHRONIC KIDNEY DISEASE (HCC): Chronic | ICD-10-CM

## 2023-10-13 DIAGNOSIS — E11.3293 TYPE 2 DIABETES MELLITUS WITH BOTH EYES AFFECTED BY MILD NONPROLIFERATIVE RETINOPATHY WITHOUT MACULAR EDEMA, WITHOUT LONG-TERM CURRENT USE OF INSULIN (HCC): ICD-10-CM

## 2023-10-13 DIAGNOSIS — E78.5 HYPERLIPIDEMIA, UNSPECIFIED HYPERLIPIDEMIA TYPE: ICD-10-CM

## 2023-10-13 DIAGNOSIS — I50.9 HEART FAILURE, UNSPECIFIED HF CHRONICITY, UNSPECIFIED HEART FAILURE TYPE (HCC): ICD-10-CM

## 2023-10-13 DIAGNOSIS — M85.80 OSTEOPENIA, UNSPECIFIED LOCATION: ICD-10-CM

## 2023-10-13 DIAGNOSIS — R07.9 CHEST PAIN, UNSPECIFIED TYPE: Primary | ICD-10-CM

## 2023-10-13 DIAGNOSIS — C68.9 UROTHELIAL CARCINOMA (HCC): ICD-10-CM

## 2023-10-13 DIAGNOSIS — F32.4 MAJOR DEPRESSIVE DISORDER WITH SINGLE EPISODE, IN PARTIAL REMISSION (HCC): ICD-10-CM

## 2023-10-13 DIAGNOSIS — Z01.818 PREOPERATIVE EXAMINATION: ICD-10-CM

## 2023-10-13 DIAGNOSIS — K21.9 GASTROESOPHAGEAL REFLUX DISEASE, UNSPECIFIED WHETHER ESOPHAGITIS PRESENT: ICD-10-CM

## 2023-10-13 DIAGNOSIS — M15.9 PRIMARY OSTEOARTHRITIS INVOLVING MULTIPLE JOINTS: ICD-10-CM

## 2023-10-13 DIAGNOSIS — I10 ESSENTIAL HYPERTENSION: ICD-10-CM

## 2023-10-13 PROBLEM — N30.00 ACUTE CYSTITIS WITHOUT HEMATURIA: Status: ACTIVE | Noted: 2023-10-13

## 2023-10-13 LAB
ALBUMIN SERPL-MCNC: 3.7 G/DL (ref 3.4–5)
ALBUMIN/GLOB SERPL: 0.9 {RATIO} (ref 1–2)
ALP LIVER SERPL-CCNC: 141 U/L
ALT SERPL-CCNC: 30 U/L
ANION GAP SERPL CALC-SCNC: 7 MMOL/L (ref 0–18)
AST SERPL-CCNC: 34 U/L (ref 15–37)
BASOPHILS # BLD AUTO: 0.05 X10(3) UL (ref 0–0.2)
BASOPHILS NFR BLD AUTO: 0.9 %
BILIRUB SERPL-MCNC: 0.4 MG/DL (ref 0.1–2)
BUN BLD-MCNC: 28 MG/DL (ref 7–18)
BUN/CREAT SERPL: 21.1 (ref 10–20)
CALCIUM BLD-MCNC: 10 MG/DL (ref 8.5–10.1)
CHLORIDE SERPL-SCNC: 106 MMOL/L (ref 98–112)
CO2 SERPL-SCNC: 29 MMOL/L (ref 21–32)
CREAT BLD-MCNC: 1.33 MG/DL
D DIMER PPP FEU-MCNC: 0.37 UG/ML FEU (ref ?–0.76)
DEPRECATED RDW RBC AUTO: 47 FL (ref 35.1–46.3)
EGFRCR SERPLBLD CKD-EPI 2021: 41 ML/MIN/1.73M2 (ref 60–?)
EOSINOPHIL # BLD AUTO: 0.11 X10(3) UL (ref 0–0.7)
EOSINOPHIL NFR BLD AUTO: 2 %
ERYTHROCYTE [DISTWIDTH] IN BLOOD BY AUTOMATED COUNT: 14 % (ref 11–15)
GLOBULIN PLAS-MCNC: 4.3 G/DL (ref 2.8–4.4)
GLUCOSE BLD-MCNC: 105 MG/DL (ref 70–99)
HCT VFR BLD AUTO: 36.9 %
HGB BLD-MCNC: 11.6 G/DL
IMM GRANULOCYTES # BLD AUTO: 0.02 X10(3) UL (ref 0–1)
IMM GRANULOCYTES NFR BLD: 0.4 %
LYMPHOCYTES # BLD AUTO: 2.34 X10(3) UL (ref 1–4)
LYMPHOCYTES NFR BLD AUTO: 43.6 %
MCH RBC QN AUTO: 28.7 PG (ref 26–34)
MCHC RBC AUTO-ENTMCNC: 31.4 G/DL (ref 31–37)
MCV RBC AUTO: 91.3 FL
MONOCYTES # BLD AUTO: 0.56 X10(3) UL (ref 0.1–1)
MONOCYTES NFR BLD AUTO: 10.4 %
NEUTROPHILS # BLD AUTO: 2.29 X10 (3) UL (ref 1.5–7.7)
NEUTROPHILS # BLD AUTO: 2.29 X10(3) UL (ref 1.5–7.7)
NEUTROPHILS NFR BLD AUTO: 42.7 %
OSMOLALITY SERPL CALC.SUM OF ELEC: 300 MOSM/KG (ref 275–295)
PLATELET # BLD AUTO: 328 10(3)UL (ref 150–450)
POTASSIUM SERPL-SCNC: 4.9 MMOL/L (ref 3.5–5.1)
PROT SERPL-MCNC: 8 G/DL (ref 6.4–8.2)
RBC # BLD AUTO: 4.04 X10(6)UL
SODIUM SERPL-SCNC: 142 MMOL/L (ref 136–145)
TROPONIN I HIGH SENSITIVITY: 4 NG/L
TROPONIN I HIGH SENSITIVITY: 4 NG/L
WBC # BLD AUTO: 5.4 X10(3) UL (ref 4–11)

## 2023-10-13 PROCEDURE — 93010 ELECTROCARDIOGRAM REPORT: CPT

## 2023-10-13 PROCEDURE — 85025 COMPLETE CBC W/AUTO DIFF WBC: CPT | Performed by: EMERGENCY MEDICINE

## 2023-10-13 PROCEDURE — 93005 ELECTROCARDIOGRAM TRACING: CPT

## 2023-10-13 PROCEDURE — 71045 X-RAY EXAM CHEST 1 VIEW: CPT | Performed by: EMERGENCY MEDICINE

## 2023-10-13 PROCEDURE — 99213 OFFICE O/P EST LOW 20 MIN: CPT | Performed by: FAMILY MEDICINE

## 2023-10-13 PROCEDURE — 3078F DIAST BP <80 MM HG: CPT | Performed by: FAMILY MEDICINE

## 2023-10-13 PROCEDURE — 96374 THER/PROPH/DIAG INJ IV PUSH: CPT

## 2023-10-13 PROCEDURE — 85025 COMPLETE CBC W/AUTO DIFF WBC: CPT

## 2023-10-13 PROCEDURE — 99284 EMERGENCY DEPT VISIT MOD MDM: CPT

## 2023-10-13 PROCEDURE — 99285 EMERGENCY DEPT VISIT HI MDM: CPT

## 2023-10-13 PROCEDURE — 84484 ASSAY OF TROPONIN QUANT: CPT | Performed by: EMERGENCY MEDICINE

## 2023-10-13 PROCEDURE — 80053 COMPREHEN METABOLIC PANEL: CPT

## 2023-10-13 PROCEDURE — 3074F SYST BP LT 130 MM HG: CPT | Performed by: FAMILY MEDICINE

## 2023-10-13 PROCEDURE — 85379 FIBRIN DEGRADATION QUANT: CPT | Performed by: EMERGENCY MEDICINE

## 2023-10-13 PROCEDURE — 80053 COMPREHEN METABOLIC PANEL: CPT | Performed by: EMERGENCY MEDICINE

## 2023-10-13 PROCEDURE — 3008F BODY MASS INDEX DOCD: CPT | Performed by: FAMILY MEDICINE

## 2023-10-13 PROCEDURE — 84484 ASSAY OF TROPONIN QUANT: CPT

## 2023-10-13 PROCEDURE — S0028 INJECTION, FAMOTIDINE, 20 MG: HCPCS | Performed by: EMERGENCY MEDICINE

## 2023-10-13 RX ORDER — FAMOTIDINE 10 MG/ML
20 INJECTION, SOLUTION INTRAVENOUS ONCE
Status: COMPLETED | OUTPATIENT
Start: 2023-10-13 | End: 2023-10-13

## 2023-10-13 RX ORDER — ASPIRIN 81 MG/1
324 TABLET, CHEWABLE ORAL ONCE
Status: COMPLETED | OUTPATIENT
Start: 2023-10-13 | End: 2023-10-13

## 2023-10-13 NOTE — ASSESSMENT & PLAN NOTE
Following with urology  Had upcoming surgery planned, however since she is having active chest pain this may need to be delayed.

## 2023-10-13 NOTE — ASSESSMENT & PLAN NOTE
Patient with active chest pain and slight dyspnea at this time. Some of this could be anxiety related as she has significant stressors in her life. Her vitals look good in the office currently. However given her cardiac history, I recommend that she be formally evaluated. She is actively having pain. Offered getting ambulance, patient and son prefer that son drives her to the ER. All questions answered. ER notified.

## 2023-10-13 NOTE — DISCHARGE INSTRUCTIONS
If you have any worsening of symptoms including chest pain, shortness of breath, lightheadedness, extremity swelling, or other new and concerning symptoms, return to the ER.

## 2023-10-13 NOTE — ASSESSMENT & PLAN NOTE
Recent urinary tract infection seen on urine culture ordered for surgery  Taking nitrofurantoin for this.

## 2023-10-13 NOTE — ASSESSMENT & PLAN NOTE
Hypertension well-controlled at this time. Continue lisinopril hydrochlorothiazide 20-25 mg daily, metoprolol tartrate 25 mg daily. Day prior to surgery we recommend pausing lisinopril hydrochlorothiazide. Must take metoprolol prior to surgery.

## 2023-10-13 NOTE — ASSESSMENT & PLAN NOTE
Diabetes overall controlled. Continue metformin at 1000 mg twice daily, continue Sitagliptin 100 mg daily.

## 2023-10-13 NOTE — ASSESSMENT & PLAN NOTE
Following with cardiology. She was evaluated recently and notified that she can proceed with surgery. However given active chest pain, will have patient be evaluated emergency department at this time. Typically using isosorbide as needed. Blood pressure controlled with lisinopril hydrochlorothiazide 20-25 mg daily, metoprolol tartrate 25 mg daily. If needing another preoperative evaluation, would benefit from being seen by cardiology again prior.

## 2023-10-13 NOTE — ED INITIAL ASSESSMENT (HPI)
Patient ambulatory to triage, c/o midsternal chest pain and shortness of breat that started this morning. Vital signs stable.

## 2023-10-14 LAB
ATRIAL RATE: 61 BPM
P AXIS: 53 DEGREES
P-R INTERVAL: 150 MS
Q-T INTERVAL: 392 MS
QRS DURATION: 70 MS
QTC CALCULATION (BEZET): 394 MS
R AXIS: 27 DEGREES
T AXIS: 50 DEGREES
VENTRICULAR RATE: 61 BPM

## 2023-10-17 DIAGNOSIS — E11.9 TYPE 2 DIABETES MELLITUS WITHOUT COMPLICATION, WITHOUT LONG-TERM CURRENT USE OF INSULIN (HCC): ICD-10-CM

## 2023-10-17 DIAGNOSIS — E78.5 HYPERLIPIDEMIA, UNSPECIFIED HYPERLIPIDEMIA TYPE: ICD-10-CM

## 2023-10-17 NOTE — TELEPHONE ENCOUNTER
Last OV:10-13-23  Last refill:4-24-23    Next Appt:  With Internal Medicine BIJAN Tse)  10/18/2023 at 1:00 PM

## 2023-10-18 ENCOUNTER — OFFICE VISIT (OUTPATIENT)
Dept: INTERNAL MEDICINE CLINIC | Facility: CLINIC | Age: 78
End: 2023-10-18
Payer: COMMERCIAL

## 2023-10-18 VITALS
DIASTOLIC BLOOD PRESSURE: 64 MMHG | HEIGHT: 58 IN | HEART RATE: 69 BPM | WEIGHT: 140.19 LBS | SYSTOLIC BLOOD PRESSURE: 118 MMHG | BODY MASS INDEX: 29.43 KG/M2 | OXYGEN SATURATION: 97 %

## 2023-10-18 DIAGNOSIS — N18.30 STAGE 3 CHRONIC KIDNEY DISEASE, UNSPECIFIED WHETHER STAGE 3A OR 3B CKD (HCC): Chronic | ICD-10-CM

## 2023-10-18 DIAGNOSIS — K21.9 GASTROESOPHAGEAL REFLUX DISEASE WITHOUT ESOPHAGITIS: ICD-10-CM

## 2023-10-18 DIAGNOSIS — E11.3293 TYPE 2 DIABETES MELLITUS WITH BOTH EYES AFFECTED BY MILD NONPROLIFERATIVE RETINOPATHY WITHOUT MACULAR EDEMA, WITHOUT LONG-TERM CURRENT USE OF INSULIN (HCC): ICD-10-CM

## 2023-10-18 DIAGNOSIS — I10 ESSENTIAL HYPERTENSION: ICD-10-CM

## 2023-10-18 DIAGNOSIS — F41.9 ANXIETY: ICD-10-CM

## 2023-10-18 DIAGNOSIS — Z01.818 PREOPERATIVE EXAMINATION: Primary | ICD-10-CM

## 2023-10-18 PROCEDURE — 1159F MED LIST DOCD IN RCRD: CPT

## 2023-10-18 PROCEDURE — 3078F DIAST BP <80 MM HG: CPT

## 2023-10-18 PROCEDURE — 3074F SYST BP LT 130 MM HG: CPT

## 2023-10-18 PROCEDURE — 3008F BODY MASS INDEX DOCD: CPT

## 2023-10-18 PROCEDURE — 1160F RVW MEDS BY RX/DR IN RCRD: CPT

## 2023-10-18 PROCEDURE — 99214 OFFICE O/P EST MOD 30 MIN: CPT

## 2023-10-18 RX ORDER — ATORVASTATIN CALCIUM 10 MG/1
10 TABLET, FILM COATED ORAL NIGHTLY
Qty: 90 TABLET | Refills: 3 | Status: SHIPPED | OUTPATIENT
Start: 2023-10-18

## 2023-11-27 ENCOUNTER — OFFICE VISIT (OUTPATIENT)
Dept: INTERNAL MEDICINE CLINIC | Facility: CLINIC | Age: 78
End: 2023-11-27
Payer: COMMERCIAL

## 2023-11-27 VITALS
TEMPERATURE: 98 F | HEART RATE: 59 BPM | OXYGEN SATURATION: 98 % | DIASTOLIC BLOOD PRESSURE: 76 MMHG | HEIGHT: 58 IN | WEIGHT: 136 LBS | SYSTOLIC BLOOD PRESSURE: 118 MMHG | BODY MASS INDEX: 28.55 KG/M2

## 2023-11-27 DIAGNOSIS — H40.9 GLAUCOMA OF BOTH EYES, UNSPECIFIED GLAUCOMA TYPE: ICD-10-CM

## 2023-11-27 DIAGNOSIS — I50.9 HEART FAILURE, UNSPECIFIED HF CHRONICITY, UNSPECIFIED HEART FAILURE TYPE (HCC): ICD-10-CM

## 2023-11-27 DIAGNOSIS — M85.80 OSTEOPENIA, UNSPECIFIED LOCATION: ICD-10-CM

## 2023-11-27 DIAGNOSIS — K21.9 GASTROESOPHAGEAL REFLUX DISEASE, UNSPECIFIED WHETHER ESOPHAGITIS PRESENT: ICD-10-CM

## 2023-11-27 DIAGNOSIS — N18.31 STAGE 3A CHRONIC KIDNEY DISEASE (HCC): Chronic | ICD-10-CM

## 2023-11-27 DIAGNOSIS — K74.60 CIRRHOSIS OF LIVER WITHOUT ASCITES, UNSPECIFIED HEPATIC CIRRHOSIS TYPE (HCC): Chronic | ICD-10-CM

## 2023-11-27 DIAGNOSIS — E11.3293 TYPE 2 DIABETES MELLITUS WITH BOTH EYES AFFECTED BY MILD NONPROLIFERATIVE RETINOPATHY WITHOUT MACULAR EDEMA, WITHOUT LONG-TERM CURRENT USE OF INSULIN (HCC): Primary | ICD-10-CM

## 2023-11-27 DIAGNOSIS — F41.9 ANXIETY: ICD-10-CM

## 2023-11-27 DIAGNOSIS — F32.4 MAJOR DEPRESSIVE DISORDER WITH SINGLE EPISODE, IN PARTIAL REMISSION (HCC): ICD-10-CM

## 2023-11-27 DIAGNOSIS — H26.9 CATARACT OF BOTH EYES, UNSPECIFIED CATARACT TYPE: ICD-10-CM

## 2023-11-27 DIAGNOSIS — R07.9 CHEST PAIN, UNSPECIFIED TYPE: ICD-10-CM

## 2023-11-27 DIAGNOSIS — E78.5 HYPERLIPIDEMIA, UNSPECIFIED HYPERLIPIDEMIA TYPE: ICD-10-CM

## 2023-11-27 DIAGNOSIS — C68.9 UROTHELIAL CARCINOMA (HCC): ICD-10-CM

## 2023-11-27 DIAGNOSIS — I10 ESSENTIAL HYPERTENSION: ICD-10-CM

## 2023-11-27 PROBLEM — Z01.818 PREOPERATIVE EXAMINATION: Status: RESOLVED | Noted: 2023-02-06 | Resolved: 2023-11-27

## 2023-11-27 PROBLEM — N30.00 ACUTE CYSTITIS WITHOUT HEMATURIA: Status: RESOLVED | Noted: 2023-10-13 | Resolved: 2023-11-27

## 2023-11-27 RX ORDER — MULTIVITAMIN
1 TABLET ORAL DAILY
COMMUNITY

## 2023-11-27 NOTE — ASSESSMENT & PLAN NOTE
Diabetes overall controlled. Continue metformin at 1000 mg twice daily  She reports Sitagliptin is now too expensive. Will discontinue Sitagliptin 100 mg daily as her recent A1c was well controlled. Follow-up in 3 months to reassess A1c while off Sitagliptin.

## 2023-11-27 NOTE — ASSESSMENT & PLAN NOTE
Following with cardiology. Blood pressure controlled with lisinopril hydrochlorothiazide 20-25 mg daily, metoprolol tartrate 25 mg daily. Has isosorbide as needed.

## 2023-11-27 NOTE — ASSESSMENT & PLAN NOTE
Mood is improved at this time. Notes that she has good support with her friends at this time. She has been having some anxiety at night, however she does note irregular sleep habits which include napping in the middle of the day. Advise focusing on sleep hygiene. Does not quite seem like she has generalized anxiety disorder, does have some stress with various health related and financial related concerns however. I discussed with her that one option is to consider medication therapy for management of anxiety-she defers this for now. Discussed again if needed, can consider therapy-she will reach out to me if she is desiring this. Follow-up as needed.

## 2023-11-27 NOTE — ASSESSMENT & PLAN NOTE
Following with urology-advised that she have physician send over records to my office so that I can continue to follow with treatment course.

## 2023-11-27 NOTE — PATIENT INSTRUCTIONS
PATIENT INSTRUCTIONS    Thank you for seeing me today, it was a pleasure taking care of you. Please check out at the  and schedule a follow up appointment. Return in about 3 months (around 2/27/2024) for medicare visit. Please remember that the anthony period for all appointments is 5 minutes. This is to help maximize the amount of time that we can spend together at our visits. Stop [place] and [place]. Continue metformin  We will reassess your diabetes level at the next office visit with me.    Monitor your anxiety  Healthy diet, exercise  If anxiety is worsening, can contact me if desiring a referral to therapist   Avoid napping to begin correcting your sleep pattern over time  Please have urology forward their notes to me   See eye doctor       [place],  Dr. Stalin Womack

## 2024-01-03 DIAGNOSIS — E11.9 TYPE 2 DIABETES MELLITUS WITHOUT COMPLICATION, WITHOUT LONG-TERM CURRENT USE OF INSULIN (HCC): ICD-10-CM

## 2024-01-06 ENCOUNTER — HOSPITAL ENCOUNTER (OUTPATIENT)
Dept: CT IMAGING | Facility: HOSPITAL | Age: 79
Discharge: HOME | End: 2024-01-06
Attending: UROLOGY
Payer: MEDICARE

## 2024-01-06 DIAGNOSIS — C68.9 UROTHELIAL CARCINOMA (HCC): ICD-10-CM

## 2024-01-06 LAB
CREAT BLD-MCNC: 1.3 MG/DL
EGFRCR SERPLBLD CKD-EPI 2021: 43 ML/MIN/1.73M2 (ref 60–?)

## 2024-01-06 PROCEDURE — 82565 ASSAY OF CREATININE: CPT

## 2024-01-06 PROCEDURE — 74178 CT ABD&PLV WO CNTR FLWD CNTR: CPT | Performed by: UROLOGY

## 2024-02-07 ENCOUNTER — OFFICE VISIT (OUTPATIENT)
Dept: INTERNAL MEDICINE CLINIC | Facility: CLINIC | Age: 79
End: 2024-02-07
Payer: COMMERCIAL

## 2024-02-07 VITALS
WEIGHT: 134 LBS | TEMPERATURE: 97 F | BODY MASS INDEX: 28.13 KG/M2 | DIASTOLIC BLOOD PRESSURE: 78 MMHG | HEART RATE: 74 BPM | OXYGEN SATURATION: 98 % | SYSTOLIC BLOOD PRESSURE: 122 MMHG | HEIGHT: 58 IN

## 2024-02-07 DIAGNOSIS — K21.9 GASTROESOPHAGEAL REFLUX DISEASE, UNSPECIFIED WHETHER ESOPHAGITIS PRESENT: ICD-10-CM

## 2024-02-07 DIAGNOSIS — E66.3 OVERWEIGHT (BMI 25.0-29.9): ICD-10-CM

## 2024-02-07 DIAGNOSIS — Z00.00 HEALTH MAINTENANCE EXAMINATION: Primary | ICD-10-CM

## 2024-02-07 DIAGNOSIS — H40.9 GLAUCOMA OF BOTH EYES, UNSPECIFIED GLAUCOMA TYPE: ICD-10-CM

## 2024-02-07 DIAGNOSIS — F41.9 ANXIETY: ICD-10-CM

## 2024-02-07 DIAGNOSIS — N18.31 STAGE 3A CHRONIC KIDNEY DISEASE (HCC): Chronic | ICD-10-CM

## 2024-02-07 DIAGNOSIS — E11.3293 TYPE 2 DIABETES MELLITUS WITH BOTH EYES AFFECTED BY MILD NONPROLIFERATIVE RETINOPATHY WITHOUT MACULAR EDEMA, WITHOUT LONG-TERM CURRENT USE OF INSULIN (HCC): ICD-10-CM

## 2024-02-07 DIAGNOSIS — H26.9 CATARACT OF BOTH EYES, UNSPECIFIED CATARACT TYPE: ICD-10-CM

## 2024-02-07 DIAGNOSIS — I50.9 HEART FAILURE, UNSPECIFIED HF CHRONICITY, UNSPECIFIED HEART FAILURE TYPE (HCC): ICD-10-CM

## 2024-02-07 DIAGNOSIS — I10 ESSENTIAL HYPERTENSION: ICD-10-CM

## 2024-02-07 DIAGNOSIS — M85.80 OSTEOPENIA, UNSPECIFIED LOCATION: ICD-10-CM

## 2024-02-07 DIAGNOSIS — C68.9 UROTHELIAL CARCINOMA (HCC): ICD-10-CM

## 2024-02-07 DIAGNOSIS — F32.4 MAJOR DEPRESSIVE DISORDER WITH SINGLE EPISODE, IN PARTIAL REMISSION (HCC): ICD-10-CM

## 2024-02-07 DIAGNOSIS — E78.5 HYPERLIPIDEMIA, UNSPECIFIED HYPERLIPIDEMIA TYPE: ICD-10-CM

## 2024-02-07 DIAGNOSIS — K74.60 CIRRHOSIS OF LIVER WITHOUT ASCITES, UNSPECIFIED HEPATIC CIRRHOSIS TYPE (HCC): Chronic | ICD-10-CM

## 2024-02-07 DIAGNOSIS — M15.9 PRIMARY OSTEOARTHRITIS INVOLVING MULTIPLE JOINTS: ICD-10-CM

## 2024-02-07 RX ORDER — SITAGLIPTIN 100 MG/1
TABLET, FILM COATED ORAL
COMMUNITY
Start: 2024-01-07 | End: 2024-02-07

## 2024-02-07 NOTE — PATIENT INSTRUCTIONS
PATIENT INSTRUCTIONS    Thank you for seeing me today, it was a pleasure taking care of you.  Please check out at the  and schedule a follow up appointment.  Return in about 6 months (around 8/7/2024) for follow up.  Please remember that the anthony period for all appointments is 5 minutes. This is to help maximize the amount of time that we can spend together at our visits.    Please get your labs drawn - you may need to schedule a lab appointment if this was not completed at your recent doctor's visit.  The following imaging studies were ordered: None  Please also follow up with the following specialists: Ophthalmology, Urology, Cardiology  Please fill out the advance directive information (power of  documents) and bring a copy to our clinic.  Stop enrrique Jamison,   Dr. Abdulkadir Mcdaniel's SCREENING SCHEDULE   Tests on this list are recommended by your physician but may not be covered, or covered at this frequency, by your insurer.   Please check with your insurance carrier before scheduling to verify coverage.   PREVENTATIVE SERVICES FREQUENCY &  COVERAGE DETAILS LAST COMPLETION DATE   Diabetes Screening    Fasting Blood Sugar /  Glucose    One screening every 12 months if never tested or if previously tested but not diagnosed with pre-diabetes   One screening every 6 months if diagnosed with pre-diabetes Lab Results   Component Value Date     (H) 10/13/2023        Cardiovascular Disease Screening    Lipid Panel  Cholesterol  Lipoprotein (HDL)  Triglycerides Covered every 5 years for all Medicare beneficiaries without apparent signs or symptoms of cardiovascular disease Lab Results   Component Value Date    CHOLEST 104 08/14/2022    HDL 46 08/14/2022    LDL 33 08/14/2022    TRIG 149 08/14/2022         Electrocardiogram (EKG)   Covered if needed at Welcome to Medicare, and non-screening if indicated for medical reasons 10/14/2023      Ultrasound Screening for Abdominal  Aortic Aneurysm (AAA) Covered once in a lifetime for one of the following risk factors   • Men who are 65-75 years old and have ever smoked   • Anyone with a family history -     Colorectal Cancer Screening  Covered for ages 50-85; only need ONE of the following:    Colonoscopy   Covered every 10 years    Covered every 2 years if patient is at high risk or previous colonoscopy was abnormal 05/29/2019    Health Maintenance   Topic Date Due   • Colorectal Cancer Screening  Discontinued       Flexible Sigmoidoscopy   Covered every 4 years -    Fecal Occult Blood Test Covered annually -   Bone Density Screening    Bone density screening    Covered every 2 years after age 65 if diagnosed with risk of osteoporosis or estrogen deficiency.    Covered yearly for long-term glucocorticoid medication use (Steroids) Last Dexa Scan:    XR DEXA BONE DENSITOMETRY (CPT=77080) 09/23/2022      No recommendations at this time   Pap and Pelvic    Pap   Covered every 2 years for women at normal risk; Annually if at high risk -  No recommendations at this time    Chlamydia Annually if high risk -  No recommendations at this time   Screening Mammogram    Mammogram     Recommend annually for all female patients aged 40 and older    One baseline mammogram covered for patients aged 35-39 -    Health Maintenance   Topic Date Due   • Mammogram  Discontinued       Immunizations    Influenza Covered once per flu season  Please get every year 09/21/2023  No recommendations at this time    Pneumococcal Each vaccine ([id number] & [id number]) covered once after 65 Prevnar 13: 09/26/2019    Hoguivbad52: 09/27/2018     No recommendations at this time    Hepatitis B One screening covered for patients with certain risk factors   -  No recommendations at this time    Tetanus Toxoid Not covered by Medicare Part B unless medically necessary (cut with metal); may be covered with your pharmacy prescription benefits -    Tetanus, Diptheria and Pertusis TD and  TDaP Not covered by Medicare Part B -  No recommendations at this time    Zoster Not covered by Medicare Part B; may be covered with your pharmacy  prescription benefits -  No recommendations at this time     Diabetes      Hemoglobin A1C Annually; if last result is elevated, may be asked to retest more frequently.    Medicare covers every 3 months Lab Results   Component Value Date     (H) 02/05/2023    A1C 5.8 (A) 08/07/2023       Hemoglobin A1C Test due on 02/07/2024    Creat/alb ratio Annually No results found for: \"MICROALBCREA\", \"MALBCRECALC\"    LDL Annually Lab Results   Component Value Date    LDL 33 08/14/2022       Dilated Eye Exam Annually [unfilled]     Annual Monitoring of Persistent Medications (ACE/ARB, digoxin diuretics, anticonvulsants)    Potassium Annually Lab Results   Component Value Date    K 4.9 10/13/2023         Creatinine   Annually Lab Results   Component Value Date    CREATSERUM 1.33 (H) 10/13/2023         BUN Annually Lab Results   Component Value Date    BUN 28 (H) 10/13/2023       Drug Serum Conc Annually No results found for: \"DIGOXIN\", \"DIG\", \"VALP\"

## 2024-02-07 NOTE — ASSESSMENT & PLAN NOTE
Continue calcium and vitamin D.  Continue mild weight bearing exercises - walking, climbing stairs.

## 2024-02-07 NOTE — ASSESSMENT & PLAN NOTE
Mood is improved at this time.   Notes that she has good support with her friends at this time.  Does not quite seem like she has generalized anxiety disorder, does have some stress with various health related and financial related concerns however.  Previously discussed with her that one option is to consider medication therapy for management of anxiety-she defers this for now.  Discussed again if needed, can consider therapy-she will reach out to me if she is desiring this.  Follow-up as needed.

## 2024-02-07 NOTE — ASSESSMENT & PLAN NOTE
History of glaucoma, on Travatan ophthalmic solution.  Advise close follow up with ophthalmology

## 2024-02-07 NOTE — ASSESSMENT & PLAN NOTE
History of osteoarthritis of her knees.   Suspect arthritis of the hands and feet as well.   Overall walking ok - climbs stairs for exercise everyday  Using Voltaren gel.  History of CKD.  Avoid oral NSAIDs.

## 2024-02-07 NOTE — ASSESSMENT & PLAN NOTE
History of acid reflux, generally well controlled.  Periodically using pantoprazole only as needed

## 2024-02-07 NOTE — ASSESSMENT & PLAN NOTE
Diabetes overall controlled.  Continue metformin at 1000 mg twice daily  She reports Sitagliptin is now too expensive. She has financial concerns. Advised again to discontinue Sitagliptin 100 mg daily for now.   Follow-up in 6 months to reassess A1c while off Sitagliptin.

## 2024-02-07 NOTE — ASSESSMENT & PLAN NOTE
Healthy diet and exercise advised.  Has been working on this and has been slowly losing some weight.

## 2024-02-07 NOTE — PROGRESS NOTES
FAMILY MEDICINE CLINIC NOTE - MEDICARE    HPI  Toya Mcdaniel is a 78 year old female presenting for a MA (Medicare Advantage) Supervisit (Once per calendar year).    #Health Maintenance  -Diet: \"Fair\" - does not eat meat everyday, but does eat chicken, fish, shrimp. Eats a lot of vegetables. Occasional rice .  -Exercise: Stretching. Walking around the house.   -Lung cancer screen: Not indicated  -Colon cancer screen: Not indicated - not desiring further colonoscopies  -Statin:  - 8/2022  -ASA: Not indicated  -Breast cancer screen: Not indicated  -Breast cancer medication to reduce risk: Declines  -Periods: Menopause  -Cervical cancer screen: Not indicated  -DEXA: - 9/2022 - osteopenia  -BRCA: Not indicated  -Intimate partner violence: Denies abuse  -HIV screen: Not indicated  -Hep C screen: - 7/2020 negative  -Gonorrhea/chlamydia:  Not Indicated  -Syphillis: Not indicated  -TB: Not indicated  -Tobacco/alcohol: Per below      #Immunizations  -Tdap: Medicare  -Flu shot: 10/2022  -PCV13: 9/2019   -PCV20: 10/2022   -PPSV23: 9/2018   -RZV (preferred) or VZL: 2/2022, 10/2022   -RSV: Indicated  -COVID: Pfizer         #CKD  #Hydronephrosis  #Papillary urothelial carcinoma  -urology - Dr Torres, maternity leave  -urology Dr Chairez  -patient underwent cystoscopy           #MDD - much improved  #Anxiety - improving  -she was feeling depressed - husbands death, sons health, her health  -mood is much better right now  -not interseted in therapy  -no SI or HI  -reports her son is more healthy  -still with financial difficulties   -however having anxiety at night - for the past week, 4-5 days  -reports napping throughout the day, harder to sleep at night  -reports recently improving     #DM  -Hba1c: 8/2023 5.8  -Microalbuminuria: 8/2023  -B12: Indicated  -Pneumonia vaccine: Pneumovax 9/2018, Prevnar 13 9/2019  -HepB: Indicated  -Eye exam: Dr Mejias  - needs to go see  -Diabetic foot exam:  8/7/2023 Bilateral barefoot skin  diabetic exam is normal, visualized feet and the appearance is normal. Bilateral monofilament/sensation of both feet is normal. Pulsation pedal pulse exam of both lower legs/feet is normal as well.  -Current medications: metformin 1000 mg in morning and 500 mg at night, sitagliptin 100 mg daily - recently restarted on her own, met deductible.   -Blood sugars:        #Diastolic heart failure  -breathing better overall  -metoprolol 25 mg daily  -lisinopril hydrochlorothiazide 20-25 mg daily  -isosorbide  -aspirin 81 mg daily  -saw cardiology Dr Chin      #HLD  -atorvastatin 10 mg nightly     #Cataract  #Glaucoma  -travatan eye drops  -Dr Turcios - 10/5/22  -needs to see, cancelled previously for financial reason      #GERD  -taking pantoprazole 40 mg as needed     #Abnormal appearing liver -  cirrhotic morphology, suspect fatty liver disease OLMOS  -follows with GI Dr Anaya     #Osteopenia  -DEXA scan 9/22/22 - osteopenia  -calcium and vitamin D     #Arthritis  -knees  -follows with ortho Dr Gifford   -using bengay  -voltaren gel  -only pain with walking  -he is walking ok, but sometimes limping because her knee is hurting    #Discomfort in toe  -burning sensation L first toe    #Hand pain  -reports intermittent hand pain     #Additional screenings  History/Other:   Fall Risk Assessment:   She has been screened for Falls and is low risk.      Cognitive Assessment:   She had a completely normal cognitive assessment - see flowsheet entries       Functional Ability/Status:   Toya Mcdaniel has a completely normal functional assessment. See flowsheet for details.      Depression Screening (PHQ-2/PHQ-9): PHQ-2 SCORE: 0  , done 2/7/2024        Advanced Directives:   She does have a Living Will but we do NOT have it on file in Epic.    She does have a POA but we do NOT have it on file in Epic.    Discussed Advance Care Planning with patient (and family/surrogate if present). Standard forms made available to patient in  After Visit Summary.    #Patient Care Team  Patient Care Team:  William Panchal MD as PCP - General (Family Medicine)  Blade Anaya MD (GASTROENTEROLOGY)  Adriane Torres MD (UROLOGY)  Juan Turcios MD (OPHTHALMOLOGY)  Delon Chin MD (Interventional, Cardiology)  Marv Chairez DO (UROLOGY)      ROS  GENERAL: No fever/chills, no recent weight loss   HEENT: No visual changes, no changes in hearing, no sore throats  NECK: No pain, no swelling  RESP: No cough, no SOB  CV: No chest pain, no palpitations  GI: No abd pain, no N/V/D  MSK: No edema, no pain  SKIN: No new rashes  NEURO: No numbness, no tingling, no HA    HEALTH MAINTENANCE CHECKLIST  Health Maintenance Topics with due status: Overdue       Topic Date Due    Diabetes Care Dilated Eye Exam 11/10/2022    MA Annual Health Assessment 01/01/2024    Annual Depression Screening 01/01/2024    Fall Risk Screening (Annual) 01/01/2024     Health Maintenance Topics with due status: Due On       Topic Date Due    Diabetes Care A1C 02/07/2024     Health Maintenance Topics with due status: Due Soon       Topic Date Due    Colorectal Cancer Screening 05/29/2024       ALLERGIES  Allergies   Allergen Reactions    Adhesive Tape OTHER (SEE COMMENTS)     Bandage skin turns black/blue, bruising    Gabapentin DIZZINESS, NAUSEA ONLY and UNKNOWN     Does not feel well, per pt       MEDICATIONS  Current Outpatient Medications   Medication Sig Dispense Refill    metFORMIN HCl 1000 MG Oral Tab Take 1 tablet (1,000 mg total) by mouth 2 (two) times daily with meals. 180 tablet 0    Multiple Vitamin (ONE-DAILY MULTI VITAMINS) Oral Tab Take 1 tablet by mouth daily. With additional potassium      ATORVASTATIN 10 MG Oral Tab TAKE 1 TABLET(10 MG) BY MOUTH EVERY NIGHT 90 tablet 3    LUMIGAN 0.01 % Ophthalmic Solution Place 1 drop into both eyes nightly.      isosorbide mononitrate ER 30 MG Oral Tablet 24 Hr Take 1 tablet (30 mg total) by mouth daily.      latanoprost  0.005 % Ophthalmic Solution Place into both eyes nightly.      lisinopril-hydroCHLOROthiazide 20-25 MG Oral Tab TAKE 1 TABLET BY MOUTH DAILY 90 tablet 1    METOPROLOL TARTRATE 25 MG Oral Tab TAKE 1 TABLET(25 MG) BY MOUTH TWICE DAILY 180 tablet 0    PANTOPRAZOLE 40 MG Oral Tab EC TAKE 1 TABLET(40 MG) BY MOUTH EVERY MORNING BEFORE BREAKFAST 90 tablet 0    docusate sodium 100 MG Oral Cap Take 1 capsule (100 mg total) by mouth 2 (two) times daily as needed for constipation. 30 capsule 0    acetaminophen 500 MG Oral Tab Take 1 tablet (500 mg total) by mouth every 6 (six) hours as needed for Pain.      aspirin 81 MG Oral Chew Tab Chew by mouth daily.      cholecalciferol 50 MCG (2000 UT) Oral Cap Take 1 capsule (2,000 Units total) by mouth daily.      TRAVATAN Z 0.004 % Ophthalmic Solution 1 drop by Each eye route at bedtime.  0       ACTIVE PROBLEM  Patient Active Problem List   Diagnosis    Type 2 diabetes mellitus with both eyes affected by mild nonproliferative retinopathy without macular edema, without long-term current use of insulin (HCC)    Essential hypertension    Glaucoma of both eyes    Overweight (BMI 25.0-29.9)    Primary osteoarthritis involving multiple joints    Cirrhosis of liver without ascites (HCC)    Osteopenia    CKD (chronic kidney disease) stage 3, GFR 30-59 ml/min ([facility])    Anxiety    Major depressive disorder with single episode, in partial remission ([facility])    Heart failure ([facility])    Hyperlipidemia    Health maintenance examination    Urothelial carcinoma (HCC)    Cataract    Gastroesophageal reflux disease       PAST MEDICAL HISTORY  Past Medical History:   Diagnosis Date    Calculus of kidney     Cataract     eye surgery 11/1/22 left eye    CKD (chronic kidney disease) stage 3, GFR 30-59 ml/min ([facility]) 07/31/2020    Current moderate episode of major depressive disorder without prior episode ([facility]) 02/04/2022    Diverticulosis of colon 08/19/2019    Essential hypertension     Glaucoma     Heart  failure (HCC)     Hx of motion sickness     Hyperlipidemia 09/06/2022    Muscle weakness     Osteoarthritis     Pyelonephritis 06/13/2022    Type 2 diabetes mellitus with both eyes affected by mild nonproliferative retinopathy without macular edema, without long-term current use of insulin (HCC)     Urothelial carcinoma (HCC) 12/08/2022    Visual impairment     Glasses       PAST SOCIAL HISTORY  Social History     Socioeconomic History    Marital status:      Spouse name: Not on file    Number of children: Not on file    Years of education: Not on file    Highest education level: Not on file   Occupational History    Not on file   Tobacco Use    Smoking status: Never    Smokeless tobacco: Never   Vaping Use    Vaping Use: Never used   Substance and Sexual Activity    Alcohol use: Never    Drug use: Never    Sexual activity: Not Currently     Partners: Male   Other Topics Concern    Caffeine Concern Not Asked    Exercise Not Asked    Seat Belt Not Asked    Special Diet Not Asked    Stress Concern Not Asked    Weight Concern Not Asked   Social History Narrative    Relationships: .  passed away 2021.     Children: 3 kids - Niles, Ovidio, Jean Marie - all adults.     Pets: None    School: N/A    Work: Retired. Previously self - employed  and .     Origin: Born in [place], came to [place] in 1961.     Interests: Enjoys talking on phone with friends, enjoys walking around the neighborhood. Enjoys gardening.     Spiritual: Believes in God but does not go to Jew.      Social Determinants of Health     Financial Resource Strain: Not on file   Food Insecurity: Not on file   Transportation Needs: Not on file   Physical Activity: Not on file   Stress: Not on file   Social Connections: Not on file   Housing Stability: Not on file       CAGE Alcohol Screen:   CAGE screening score of 0 on 2/7/2024, showing low risk of alcohol abuse.          PAST SURGICAL HISTORY  Past Surgical  History:   Procedure Laterality Date    CATARACT Bilateral     COLONOSCOPY      COLONOSCOPY N/A 05/29/2019    Procedure: COLONOSCOPY;  Surgeon: Blade Anaya MD;  Location: [facility] ENDO    EGD  2019    HYSTERECTOMY      OOPHORECTOMY  1984       PAST FAMILY HISTORY  Family History   Problem Relation Age of Onset    Cancer Mother         kidney    Diabetes Father     No Known Problems Sister     Diabetes Sister     Other (MVA) Sister     No Known Problems Sister     Other (Infant) Sister     No Known Problems Sister     No Known Problems Sister     No Known Problems Sister     No Known Problems Brother     No Known Problems Brother     Cancer Maternal Grandmother     No Known Problems Maternal Grandfather     No Known Problems Paternal Grandmother     Other (GSW) Paternal Grandfather     Colon Cancer Neg     Breast Cancer Neg        PHYSICAL EXAM  Vitals:    02/07/24 0909   BP: 122/78   Pulse: 74   Temp: 97.4 °F (36.3 °C)   SpO2: 98%   Weight: 134 lb (60.8 kg)   Height: 4' 10\" (1.473 m)      Body mass index is 28.01 kg/m².    Medicare Hearing Assessment:  Hearing Screening    Time taken: 2/7/2024 12:22 PM  Screening Method: Finger Rub  Finger Rub Result: Pass             Visual Acuity:   Visual Acuity:   Right Eye Visual Acuity: Corrected Right Eye Chart Acuity: 20/30   Left Eye Visual Acuity: Corrected Left Eye Chart Acuity: 20/40   Both Eyes Visual Acuity: Corrected Both Eyes Chart Acuity: 20/40   Able To Tolerate Visual Acuity: Yes          GENERAL: NAD  HEENT: Moist mucous membranes, no tonsillar swelling or erythema, PERRLA bilat, TM translucent and non-bulging  NECK: Supple, non-tender  RESP: CTAB, no wheezing, no rales, no ronchi  CV: RRR, no murmurs  GI: Soft, non-distended, non-tender, no guarding, no rebound, no masses  MSK: No edema.  No significant tenderness through the hands and feet.  SKIN: Warm and dry, no rashes  NEURO: Answering questions appropriately    LABS    Lab Results   Component  Value Date    WBC 5.4 10/13/2023    HGB 11.6 (L) 10/13/2023    .0 10/13/2023       Lab Results   Component Value Date    AST 34 10/13/2023    ALT 30 10/13/2023    CA 10.0 10/13/2023    ALB 3.7 10/13/2023    CREATSERUM 1.33 (H) 10/13/2023     (H) 10/13/2023       Lab Results   Component Value Date    CHOLEST 104 08/14/2022    HDL 46 08/14/2022    LDL 33 08/14/2022    TRIG 149 08/14/2022         DIAGNOSTICS    ASSESSMENT/PLAN  Problem List Items Addressed This Visit          HCC Problems    Cirrhosis of liver without ascites (HCC) (Chronic)     History of cirrhotic morphology, with suspicion of fatty liver disease OLMOS  Follows with GI         CKD (chronic kidney disease) stage 3, GFR 30-59 ml/min (HCC) (Chronic)     Avoid NSAIDs.  Following with urology         Heart failure (HCC)     Following with cardiology.  Blood pressure controlled with lisinopril hydrochlorothiazide 20-25 mg daily, metoprolol tartrate 25 mg daily.  Has isosorbide as needed.         Major depressive disorder with single episode, in partial remission (HCC)     Mood is improved at this time.   Notes that she has good support with her friends at this time.  Does not quite seem like she has generalized anxiety disorder, does have some stress with various health related and financial related concerns however.  Previously discussed with her that one option is to consider medication therapy for management of anxiety-she defers this for now.  Discussed again if needed, can consider therapy-she will reach out to me if she is desiring this.  Follow-up as needed.         Type 2 diabetes mellitus with both eyes affected by mild nonproliferative retinopathy without macular edema, without long-term current use of insulin (HCC)     Diabetes overall controlled.  Continue metformin at 1000 mg twice daily  She reports Sitagliptin is now too expensive. She has financial concerns. Advised again to discontinue Sitagliptin 100 mg daily for now.    Follow-up in 6 months to reassess A1c while off Sitagliptin.         Relevant Orders    Comp Metabolic Panel (14)    Hemoglobin A1C    Microalb/Creat Ratio, Random Urine    Lipid Panel    Urothelial carcinoma (HCC)     Following with urology-advised that she have physician send over records to my office so that I can continue to follow with treatment course.            Cardiac and Vasculature    Essential hypertension     Hypertension well-controlled at this time.  Continue lisinopril hydrochlorothiazide 20-25 mg daily, metoprolol tartrate 25 mg daily.         Hyperlipidemia     Continue atorvastatin 10 mg nightly.  Check CMP and lipid panel            Endocrine and Metabolic    Overweight (BMI 25.0-29.9)     Healthy diet and exercise advised.  Has been working on this and has been slowly losing some weight.             Mental Health    Anxiety     Mood is improved at this time.   Notes that she has good support with her friends at this time.  Does not quite seem like she has generalized anxiety disorder, does have some stress with various health related and financial related concerns however.  Previously discussed with her that one option is to consider medication therapy for management of anxiety-she defers this for now.  Discussed again if needed, can consider therapy-she will reach out to me if she is desiring this.  Follow-up as needed.            Gastrointestinal and Abdominal    Gastroesophageal reflux disease     History of acid reflux, generally well controlled.  Periodically using pantoprazole only as needed            Musculoskeletal and Injuries    Osteopenia     Continue calcium and vitamin D.  Continue mild weight bearing exercises - walking, climbing stairs.         Primary osteoarthritis involving multiple joints     History of osteoarthritis of her knees.   Suspect arthritis of the hands and feet as well.   Overall walking ok - climbs stairs for exercise everyday  Using Voltaren gel.  History of  CKD.  Avoid oral NSAIDs.            Eye    Cataract     History of cataracts, history of cataract surgery bilateral eyes.  Follows with ophthalmology.  Doing well at this time.          Glaucoma of both eyes     History of glaucoma, on Travatan ophthalmic solution.  Advise close follow up with ophthalmology             Health Encounters    Health maintenance examination - Primary     Exercise and diet advised.  CMP, lipid panel, Hba1c  Advanced directive information provided.            Return in about 6 months (around 8/7/2024) for follow up.    William Panchal MD  Family Medicine    2/7/2024         Supplementary Documentation:   General Health:  In the past six months, have you lost more than 10 pounds without trying?: 1 - Yes  Has your appetite been poor?: No  Type of Diet: Balanced  How does the patient maintain a good energy level?: Appropriate Exercise  How would you describe your daily physical activity?: Moderate  How would you describe your current health state?: Fair  How do you maintain positive mental well-being?: Social Interaction  On a scale of 0 to 10, with 0 being no pain and 10 being severe pain, what is your pain level?: 6 - (Moderate)  In the past six months, have you experienced urine leakage?: 0-No  At any time do you feel concerned for the safety/well-being of yourself and/or your children, in your home or elsewhere?: Yes  Have you had any immunizations at another office such as Influenza, Hepatitis B, Tetanus, or Pneumococcal?: Yes       Toya Mcdaniel's SCREENING SCHEDULE   Tests on this list are recommended by your physician but may not be covered, or covered at this frequency, by your insurer.   Please check with your insurance carrier before scheduling to verify coverage.   PREVENTATIVE SERVICES FREQUENCY &  COVERAGE DETAILS LAST COMPLETION DATE   Diabetes Screening    Fasting Blood Sugar /  Glucose    One screening every 12 months if never tested or if previously tested but not  diagnosed with pre-diabetes   One screening every 6 months if diagnosed with pre-diabetes Lab Results   Component Value Date     (H) 10/13/2023        Cardiovascular Disease Screening    Lipid Panel  Cholesterol  Lipoprotein (HDL)  Triglycerides Covered every 5 years for all Medicare beneficiaries without apparent signs or symptoms of cardiovascular disease Lab Results   Component Value Date    CHOLEST 104 08/14/2022    HDL 46 08/14/2022    LDL 33 08/14/2022    TRIG 149 08/14/2022         Electrocardiogram (EKG)   Covered if needed at Welcome to Medicare, and non-screening if indicated for medical reasons 10/14/2023      Ultrasound Screening for Abdominal Aortic Aneurysm (AAA) Covered once in a lifetime for one of the following risk factors    Men who are 65-75 years old and have ever smoked    Anyone with a family history -     Colorectal Cancer Screening  Covered for ages 50-85; only need ONE of the following:    Colonoscopy   Covered every 10 years    Covered every 2 years if patient is at high risk or previous colonoscopy was abnormal 05/29/2019    Health Maintenance   Topic Date Due    Colorectal Cancer Screening  Discontinued       Flexible Sigmoidoscopy   Covered every 4 years -    Fecal Occult Blood Test Covered annually -   Bone Density Screening    Bone density screening    Covered every 2 years after age 65 if diagnosed with risk of osteoporosis or estrogen deficiency.    Covered yearly for long-term glucocorticoid medication use (Steroids) Last Dexa Scan:    XR DEXA BONE DENSITOMETRY (CPT=77080) 09/23/2022      No recommendations at this time   Pap and Pelvic    Pap   Covered every 2 years for women at normal risk; Annually if at high risk -  No recommendations at this time    Chlamydia Annually if high risk -  No recommendations at this time   Screening Mammogram    Mammogram     Recommend annually for all female patients aged 40 and older    One baseline mammogram covered for patients aged 35-39  -    Health Maintenance   Topic Date Due    Mammogram  Discontinued       Immunizations    Influenza Covered once per flu season  Please get every year 09/21/2023  No recommendations at this time    Pneumococcal Each vaccine ([id number] & [id number]) covered once after 65 Prevnar 13: 09/26/2019    [id number]: 09/27/2018     No recommendations at this time    Hepatitis B One screening covered for patients with certain risk factors   -  No recommendations at this time    Tetanus Toxoid Not covered by Medicare Part B unless medically necessary (cut with metal); may be covered with your pharmacy prescription benefits -    Tetanus, Diptheria and Pertusis TD and TDaP Not covered by Medicare Part B -  No recommendations at this time    Zoster Not covered by Medicare Part B; may be covered with your pharmacy  prescription benefits -  No recommendations at this time     Diabetes      Hemoglobin A1C Annually; if last result is elevated, may be asked to retest more frequently.    Medicare covers every 3 months Lab Results   Component Value Date     (H) 02/05/2023    A1C 5.8 (A) 08/07/2023       Hemoglobin A1C Test due on 02/07/2024    Creat/alb ratio Annually No results found for: \"MICROALBCREA\", \"MALBCRECALC\"    LDL Annually Lab Results   Component Value Date    LDL 33 08/14/2022       Dilated Eye Exam Annually Last Diabetic Eye Exam:  No data recorded  No data recorded       Annual Monitoring of Persistent Medications (ACE/ARB, digoxin diuretics, anticonvulsants)    Potassium Annually Lab Results   Component Value Date    K 4.9 10/13/2023         Creatinine   Annually Lab Results   Component Value Date    CREATSERUM 1.33 (H) 10/13/2023         BUN Annually Lab Results   Component Value Date    BUN 28 (H) 10/13/2023       Drug Serum Conc Annually No results found for: \"DIGOXIN\", \"DIG\", \"VALP\"

## 2024-02-08 LAB
ALBUMIN/GLOBULIN RATIO: 1.4 (CALC) (ref 1–2.5)
ALBUMIN: 4.4 G/DL (ref 3.6–5.1)
ALKALINE PHOSPHATASE: 141 U/L (ref 37–153)
ALT: 26 U/L (ref 6–29)
AST: 36 U/L (ref 10–35)
BILIRUBIN, TOTAL: 0.4 MG/DL (ref 0.2–1.2)
BUN/CREATININE RATIO: 31 (CALC) (ref 6–22)
BUN: 32 MG/DL (ref 7–25)
CALCIUM: 9.7 MG/DL (ref 8.6–10.4)
CARBON DIOXIDE: 24 MMOL/L (ref 20–32)
CHLORIDE: 106 MMOL/L (ref 98–110)
CHOL/HDLC RATIO: 2 (CALC)
CHOLESTEROL, TOTAL: 127 MG/DL
CREATININE, RANDOM URINE: 81 MG/DL (ref 20–275)
CREATININE: 1.03 MG/DL (ref 0.6–1)
EGFR: 56 ML/MIN/1.73M2
GLOBULIN: 3.2 G/DL (CALC) (ref 1.9–3.7)
GLUCOSE: 152 MG/DL (ref 65–99)
HDL CHOLESTEROL: 64 MG/DL
HEMOGLOBIN A1C: 5.5 % OF TOTAL HGB
LDL-CHOLESTEROL: 41 MG/DL (CALC)
MICROALBUMIN/CREATININE RATIO, RANDOM URINE: 17 MCG/MG CREAT
MICROALBUMIN: 1.4 MG/DL
NON-HDL CHOLESTEROL: 63 MG/DL (CALC)
POTASSIUM: 4.9 MMOL/L (ref 3.5–5.3)
PROTEIN, TOTAL: 7.6 G/DL (ref 6.1–8.1)
SODIUM: 139 MMOL/L (ref 135–146)
TRIGLYCERIDES: 135 MG/DL

## 2024-03-05 ENCOUNTER — HOSPITAL ENCOUNTER (OUTPATIENT)
Dept: NUCLEAR MEDICINE | Facility: HOSPITAL | Age: 79
Discharge: HOME | End: 2024-03-05
Attending: UROLOGY
Payer: MEDICARE

## 2024-03-05 DIAGNOSIS — C68.9 UROTHELIAL CARCINOMA (HCC): ICD-10-CM

## 2024-03-05 PROCEDURE — 78708 K FLOW/FUNCT IMAGE W/DRUG: CPT | Performed by: UROLOGY

## 2024-03-05 RX ORDER — FUROSEMIDE 10 MG/ML
40 INJECTION INTRAMUSCULAR; INTRAVENOUS ONCE
Status: COMPLETED | OUTPATIENT
Start: 2024-03-05 | End: 2024-03-05

## 2024-03-05 RX ORDER — FUROSEMIDE 10 MG/ML
INJECTION INTRAMUSCULAR; INTRAVENOUS
Status: COMPLETED
Start: 2024-03-05 | End: 2024-03-05

## 2024-03-05 RX ADMIN — FUROSEMIDE 40 MG: 10 INJECTION INTRAMUSCULAR; INTRAVENOUS at 13:20:00

## 2024-03-23 DIAGNOSIS — E11.9 TYPE 2 DIABETES MELLITUS WITHOUT COMPLICATION, WITHOUT LONG-TERM CURRENT USE OF INSULIN (HCC): ICD-10-CM

## 2024-03-29 ENCOUNTER — TELEPHONE (OUTPATIENT)
Facility: CLINIC | Age: 79
End: 2024-03-29

## 2024-03-29 NOTE — TELEPHONE ENCOUNTER
----- Message from Yudi Pacheco sent at 2019 10:24 AM CDT -----  Regardin YRS COLON RECALL  Recall colon in 5 years 24 per . Colon done 19

## 2024-04-01 DIAGNOSIS — I10 ESSENTIAL HYPERTENSION: ICD-10-CM

## 2024-04-01 RX ORDER — LISINOPRIL AND HYDROCHLOROTHIAZIDE 25; 20 MG/1; MG/1
1 TABLET ORAL DAILY
Qty: 90 TABLET | Refills: 1 | Status: SHIPPED | OUTPATIENT
Start: 2024-04-01

## 2024-04-01 NOTE — TELEPHONE ENCOUNTER
Patient is calling in requesting refill for Lisinopril-Hydrochlorothiazide 20-25 mg, patient only has 1 tablet left.        [id number] - [place], [place] - [address], [phone], [phone]   [address] [address]   Phone: [phone] Fax: [phone]   Hours: Not open 24 hours

## 2024-04-02 ENCOUNTER — OFFICE VISIT (OUTPATIENT)
Dept: HEMATOLOGY/ONCOLOGY | Facility: HOSPITAL | Age: 79
End: 2024-04-02
Attending: INTERNAL MEDICINE
Payer: MEDICARE

## 2024-04-02 VITALS
RESPIRATION RATE: 18 BRPM | WEIGHT: 136 LBS | SYSTOLIC BLOOD PRESSURE: 126 MMHG | BODY MASS INDEX: 29.34 KG/M2 | OXYGEN SATURATION: 100 % | TEMPERATURE: 98 F | HEIGHT: 57 IN | HEART RATE: 67 BPM | DIASTOLIC BLOOD PRESSURE: 47 MMHG

## 2024-04-02 DIAGNOSIS — C65.1 MALIGNANT NEOPLASM OF RIGHT RENAL PELVIS (HCC): ICD-10-CM

## 2024-04-02 DIAGNOSIS — C68.9 UROTHELIAL CARCINOMA (HCC): Primary | ICD-10-CM

## 2024-04-02 PROCEDURE — 99205 OFFICE O/P NEW HI 60 MIN: CPT | Performed by: INTERNAL MEDICINE

## 2024-04-02 NOTE — PROGRESS NOTES
Oncology Consult    4/2/24    Requesting: Dr. 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 Marv Chairez originally diagnosed November 2022    Multiple positive biopsies. October 2023 focal concern for high-grade disease    Patient was offered nephro ureterectomy however was concerned about renal function and has decided to not proceed with surgery      She feels well denies hematuria    Past Medical History:   Diagnosis Date    Calculus of kidney     Cataract     eye surgery 11/1/22 left eye    CKD (chronic kidney disease) stage 3, GFR 30-59 ml/min (Formerly [facility]) 07/31/2020    Current moderate episode of major depressive disorder without prior episode ([facility]) 02/04/2022    Diverticulosis of colon 08/19/2019    Essential hypertension     Glaucoma     Heart failure ([facility])     Hx of motion sickness     Hyperlipidemia 09/06/2022    Muscle weakness     Osteoarthritis     Pyelonephritis 06/13/2022    Type 2 diabetes mellitus with both eyes affected by mild nonproliferative retinopathy without macular edema, without long-term current use of insulin ([facility])     Urothelial carcinoma ([facility]) 12/08/2022    Visual impairment     Glasses     Family History   Problem Relation Age of Onset    Cancer Mother         kidney    Diabetes Father     No Known Problems Sister     Diabetes Sister     Other (MVA) Sister     No Known Problems Sister     Other (Infant) Sister     No Known Problems Sister     No Known Problems Sister     No Known Problems Sister     No Known Problems Brother     No Known Problems Brother     Cancer Maternal Grandmother     No Known Problems Maternal Grandfather     No Known Problems Paternal Grandmother     Other (GSW) Paternal Grandfather     Colon Cancer Neg     Breast Cancer Neg      Social History     Socioeconomic History    Marital status:    Tobacco  Use    Smoking status: Never    Smokeless tobacco: Never   Vaping Use    Vaping Use: Never used   Substance and Sexual Activity    Alcohol use: Never    Drug use: Never    Sexual activity: Not Currently     Partners: Male     Current Outpatient Medications on File Prior to Visit   Medication Sig Dispense Refill    lisinopril-hydroCHLOROthiazide 20-25 MG Oral Tab Take 1 tablet by mouth daily. 90 tablet 1    metFORMIN HCl 1000 MG Oral Tab Take 1 tablet (1,000 mg total) by mouth 2 (two) times daily with meals. 180 tablet 0    ATORVASTATIN 10 MG Oral Tab TAKE 1 TABLET(10 MG) BY MOUTH EVERY NIGHT 90 tablet 3    LUMIGAN 0.01 % Ophthalmic Solution Place 1 drop into both eyes nightly.      isosorbide mononitrate ER 30 MG Oral Tablet 24 Hr Take 1 tablet (30 mg total) by mouth daily.      latanoprost 0.005 % Ophthalmic Solution Place into both eyes nightly.      METOPROLOL TARTRATE 25 MG Oral Tab TAKE 1 TABLET(25 MG) BY MOUTH TWICE DAILY 180 tablet 0    acetaminophen 500 MG Oral Tab Take 1 tablet (500 mg total) by mouth every 6 (six) hours as needed for Pain.      aspirin 81 MG Oral Chew Tab Chew by mouth daily.      cholecalciferol 50 MCG (2000 UT) Oral Cap Take 1 capsule (2,000 Units total) by mouth daily.      TRAVATAN Z 0.004 % Ophthalmic Solution 1 drop by Each eye route at bedtime.  0    Multiple Vitamin (ONE-DAILY MULTI VITAMINS) Oral Tab Take 1 tablet by mouth daily. With additional potassium (Patient not taking: Reported on 4/2/2024)      PANTOPRAZOLE 40 MG Oral Tab EC TAKE 1 TABLET(40 MG) BY MOUTH EVERY MORNING BEFORE BREAKFAST (Patient not taking: Reported on 4/2/2024) 90 tablet 0     Current Facility-Administered Medications on File Prior to Visit   Medication Dose Route Frequency Provider Last Rate Last Admin    [COMPLETED] furosemide (Lasix) 10 mg/mL injection 40 mg  40 mg Intravenous Once Marv Chairez DO   40 mg at 03/05/24 1320     No results found for: \"ALLALTERNATA\", \"ALLAFUMIGAT\", \"ALLBOXELDER\",  \"ALLBERMGRASS\", \"ALLCATDANDER\", \"ALLCEDAR\", \"ALLCLADOHERB\", \"ALLROACH\", \"ALLCOTTWOOD\", \"ALLDPTERONY\" No results found for: \"ALLDFARINAE\", \"ALLDOGDANDER\", \"ALLELMTREE\", \"ALLHICKORY\", \"ALLMARSHELD\", \"ALLMOUSEEPI\", \"ALLMUCORRACE\", \"ALLMULBERRY\", \"ALLOAKTREE\", \"ALLPENICNOTA\"  No results found for: \"ALLCPIGWEED\", \"ALLCRAGWEED\", \"ALLRUSTHIST\", \"ALLSYCAMORE\", \"ALLTIMOTGR\", \"ALLWALNTTREE\", \"ALLWHITEASH\", \"IGE\"  Vitals:    04/02/24 1401   BP: 126/47   Pulse: 67   Resp: 18   Temp: 98.1 °F (36.7 °C)     NAD,RR, ND no edema moving all extremities no deformity no lymphadenopathy normal mood    A/P:  78 year old 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  Multiple positive biopsies including October 2023 focal concern for high-grade disease   -the patient has expressed that she is not interested in after nephroureterectomy  - Discussed with her urologist will repeat staging imaging repeat ureteroscopy planned next month.  Pending f/u can consider palliative systemic therapies versus radiotherapy    Return to clinic with the above studies    Data: Extensive review of multiple pathology reports review of labs discussion with urology

## 2024-04-15 ENCOUNTER — HOSPITAL ENCOUNTER (OUTPATIENT)
Dept: CT IMAGING | Facility: HOSPITAL | Age: 79
Discharge: HOME | End: 2024-04-15
Attending: INTERNAL MEDICINE
Payer: MEDICARE

## 2024-04-15 DIAGNOSIS — C68.9 UROTHELIAL CARCINOMA (HCC): ICD-10-CM

## 2024-04-15 LAB
CREAT BLD-MCNC: 1 MG/DL
EGFRCR SERPLBLD CKD-EPI 2021: 58 ML/MIN/1.73M2 (ref 60–?)

## 2024-04-15 PROCEDURE — 71250 CT THORAX DX C-: CPT | Performed by: INTERNAL MEDICINE

## 2024-04-15 PROCEDURE — 82565 ASSAY OF CREATININE: CPT

## 2024-04-15 PROCEDURE — 74178 CT ABD&PLV WO CNTR FLWD CNTR: CPT | Performed by: INTERNAL MEDICINE

## 2024-04-16 ENCOUNTER — OFFICE VISIT (OUTPATIENT)
Dept: HEMATOLOGY/ONCOLOGY | Facility: HOSPITAL | Age: 79
End: 2024-04-16
Attending: INTERNAL MEDICINE
Payer: MEDICARE

## 2024-04-16 VITALS
WEIGHT: 134 LBS | TEMPERATURE: 98 F | OXYGEN SATURATION: 98 % | SYSTOLIC BLOOD PRESSURE: 105 MMHG | HEIGHT: 57 IN | BODY MASS INDEX: 28.91 KG/M2 | HEART RATE: 65 BPM | DIASTOLIC BLOOD PRESSURE: 32 MMHG | RESPIRATION RATE: 18 BRPM

## 2024-04-16 DIAGNOSIS — C68.9 UROTHELIAL CARCINOMA (HCC): ICD-10-CM

## 2024-04-16 DIAGNOSIS — C68.9 UROTHELIAL CARCINOMA (HCC): Primary | ICD-10-CM

## 2024-04-16 DIAGNOSIS — C65.1 MALIGNANT NEOPLASM OF RIGHT RENAL PELVIS (HCC): ICD-10-CM

## 2024-04-16 LAB
ALBUMIN SERPL-MCNC: 4.5 G/DL (ref 3.2–4.8)
ALBUMIN/GLOB SERPL: 1.5 {RATIO} (ref 1–2)
ALP LIVER SERPL-CCNC: 142 U/L
ALT SERPL-CCNC: 28 U/L
ANION GAP SERPL CALC-SCNC: 4 MMOL/L (ref 0–18)
AST SERPL-CCNC: 52 U/L (ref ?–34)
BASOPHILS # BLD AUTO: 0.05 X10(3) UL (ref 0–0.2)
BASOPHILS NFR BLD AUTO: 0.6 %
BILIRUB SERPL-MCNC: 0.3 MG/DL (ref 0.2–1.1)
BUN BLD-MCNC: 17 MG/DL (ref 9–23)
BUN/CREAT SERPL: 17.9 (ref 10–20)
CALCIUM BLD-MCNC: 10.4 MG/DL (ref 8.7–10.4)
CHLORIDE SERPL-SCNC: 105 MMOL/L (ref 98–112)
CO2 SERPL-SCNC: 27 MMOL/L (ref 21–32)
CREAT BLD-MCNC: 0.95 MG/DL
DEPRECATED RDW RBC AUTO: 47.4 FL (ref 35.1–46.3)
EGFRCR SERPLBLD CKD-EPI 2021: 62 ML/MIN/1.73M2 (ref 60–?)
EOSINOPHIL # BLD AUTO: 0.17 X10(3) UL (ref 0–0.7)
EOSINOPHIL NFR BLD AUTO: 1.9 %
ERYTHROCYTE [DISTWIDTH] IN BLOOD BY AUTOMATED COUNT: 14.2 % (ref 11–15)
GLOBULIN PLAS-MCNC: 3 G/DL (ref 2.8–4.4)
GLUCOSE BLD-MCNC: 104 MG/DL (ref 70–99)
HCT VFR BLD AUTO: 37 %
HGB BLD-MCNC: 11.4 G/DL
IMM GRANULOCYTES # BLD AUTO: 0.03 X10(3) UL (ref 0–1)
IMM GRANULOCYTES NFR BLD: 0.3 %
LYMPHOCYTES # BLD AUTO: 3.72 X10(3) UL (ref 1–4)
LYMPHOCYTES NFR BLD AUTO: 42.5 %
MCH RBC QN AUTO: 27.9 PG (ref 26–34)
MCHC RBC AUTO-ENTMCNC: 30.8 G/DL (ref 31–37)
MCV RBC AUTO: 90.5 FL
MONOCYTES # BLD AUTO: 0.55 X10(3) UL (ref 0.1–1)
MONOCYTES NFR BLD AUTO: 6.3 %
NEUTROPHILS # BLD AUTO: 4.23 X10 (3) UL (ref 1.5–7.7)
NEUTROPHILS # BLD AUTO: 4.23 X10(3) UL (ref 1.5–7.7)
NEUTROPHILS NFR BLD AUTO: 48.4 %
OSMOLALITY SERPL CALC.SUM OF ELEC: 284 MOSM/KG (ref 275–295)
PLATELET # BLD AUTO: 354 10(3)UL (ref 150–450)
POTASSIUM SERPL-SCNC: 4.9 MMOL/L (ref 3.5–5.1)
PROT SERPL-MCNC: 7.5 G/DL (ref 5.7–8.2)
RBC # BLD AUTO: 4.09 X10(6)UL
SODIUM SERPL-SCNC: 136 MMOL/L (ref 136–145)
WBC # BLD AUTO: 8.8 X10(3) UL (ref 4–11)

## 2024-04-16 PROCEDURE — 99215 OFFICE O/P EST HI 40 MIN: CPT | Performed by: INTERNAL MEDICINE

## 2024-04-16 PROCEDURE — 36415 COLL VENOUS BLD VENIPUNCTURE: CPT

## 2024-04-16 PROCEDURE — 85025 COMPLETE CBC W/AUTO DIFF WBC: CPT

## 2024-04-16 PROCEDURE — 80053 COMPREHEN METABOLIC PANEL: CPT

## 2024-04-16 NOTE — PROGRESS NOTES
Oncology note    Requesting: Dr. 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 Marv Chairez originally diagnosed November 2022    Multiple positive biopsies. October 2023 focal concern for high-grade disease    Patient was offered nephro ureterectomy however was concerned about renal function and has decided to not proceed with surgery      She feels well denies hematuria    Past Medical History:    Calculus of kidney    Cataract    eye surgery 11/1/22 left eye    CKD (chronic kidney disease) stage 3, GFR 30-59 ml/min (HCC)    Current moderate episode of major depressive disorder without prior episode (HCC)    Diverticulosis of colon    Essential hypertension    Glaucoma    Heart failure (HCC)    Hx of motion sickness    Hyperlipidemia    Muscle weakness    Osteoarthritis    Pyelonephritis    Type 2 diabetes mellitus with both eyes affected by mild nonproliferative retinopathy without macular edema, without long-term current use of insulin (HCC)    Urothelial carcinoma (HCC)    Visual impairment    Glasses     Family History   Problem Relation Age of Onset    Cancer Mother         kidney    Diabetes Father     No Known Problems Sister     Diabetes Sister     Other (MVA) Sister     No Known Problems Sister     Other (Infant) Sister     No Known Problems Sister     No Known Problems Sister     No Known Problems Sister     No Known Problems Brother     No Known Problems Brother     Cancer Maternal Grandmother     No Known Problems Maternal Grandfather     No Known Problems Paternal Grandmother     Other (GSW) Paternal Grandfather     Colon Cancer Neg     Breast Cancer Neg      Social History     Socioeconomic History    Marital status:    Tobacco Use    Smoking status: Never    Smokeless tobacco: Never   Vaping Use    Vaping status: Never Used    Substance and Sexual Activity    Alcohol use: Never    Drug use: Never    Sexual activity: Not Currently     Partners: Male     Current Outpatient Medications on File Prior to Visit   Medication Sig Dispense Refill    lisinopril-hydroCHLOROthiazide 20-25 MG Oral Tab Take 1 tablet by mouth daily. 90 tablet 1    metFORMIN HCl 1000 MG Oral Tab Take 1 tablet (1,000 mg total) by mouth 2 (two) times daily with meals. 180 tablet 0    ATORVASTATIN 10 MG Oral Tab TAKE 1 TABLET(10 MG) BY MOUTH EVERY NIGHT 90 tablet 3    isosorbide mononitrate ER 30 MG Oral Tablet 24 Hr Take 1 tablet (30 mg total) by mouth daily.      latanoprost 0.005 % Ophthalmic Solution Place into both eyes nightly.      METOPROLOL TARTRATE 25 MG Oral Tab TAKE 1 TABLET(25 MG) BY MOUTH TWICE DAILY 180 tablet 0    PANTOPRAZOLE 40 MG Oral Tab EC TAKE 1 TABLET(40 MG) BY MOUTH EVERY MORNING BEFORE BREAKFAST 90 tablet 0    acetaminophen 500 MG Oral Tab Take 1 tablet (500 mg total) by mouth every 6 (six) hours as needed for Pain.      aspirin 81 MG Oral Chew Tab Chew by mouth daily.      cholecalciferol 50 MCG (2000 UT) Oral Cap Take 1 capsule (2,000 Units total) by mouth daily.       Current Facility-Administered Medications on File Prior to Visit   Medication Dose Route Frequency Provider Last Rate Last Admin    [COMPLETED] iopamidol 76% (ISOVUE-370) injection for power injector  80 mL Intravenous ONCE PRN Shukri Pascual MD   80 mL at 04/15/24 0920     No results found for: \"ALLALTERNATA\", \"ALLAFUMIGAT\", \"ALLBOXELDER\", \"ALLBERMGRASS\", \"ALLCATDANDER\", \"ALLCEDAR\", \"ALLCLADOHERB\", \"ALLROACH\", \"ALLCOTTWOOD\", \"ALLDPTERONY\" No results found for: \"ALLDFARINAE\", \"ALLDOGDANDER\", \"ALLELMTREE\", \"ALLHICKORY\", \"ALLMARSHELD\", \"ALLMOUSEEPI\", \"ALLMUCORRACE\", \"ALLMULBERRY\", \"ALLOAKTREE\", \"ALLPENICNOTA\"  No results found for: \"ALLCPIGWEED\", \"ALLCRAGWEED\", \"ALLRUSTHIST\", \"ALLSYCAMORE\", \"ALLTIMOTGR\", \"ALLWALNTTREE\", \"ALLWHITEASH\", \"IGE\"  Vitals:    04/16/24 1243   BP: 105/32    Pulse: 65   Resp: 18   Temp: 98.2 °F (36.8 °C)     NAD,RR, ND no edema moving all extremities no deformity no lymphadenopathy normal mood    A/P:  78 year old 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  Multiple positive biopsies including October 2023 focal concern for high-grade disease   -the patient has expressed that she is not interested in after nephroureterectomy  - no metastasis or nodes on imaging repeat ureteroscopy planned this month.        Patient has good renal function we discussed nephro ureterectomy again she is reluctant.  Pending f/u can consider palliative systemic therapies versus radiotherapy.   Will review at  tumor board this week with radiation oncology    Data: ct chest ct u, labs cbc cmp. Tumor board discussion

## 2024-04-17 ENCOUNTER — OFFICE VISIT (OUTPATIENT)
Dept: INTERNAL MEDICINE CLINIC | Facility: CLINIC | Age: 79
End: 2024-04-17
Payer: COMMERCIAL

## 2024-04-17 VITALS
TEMPERATURE: 98 F | HEART RATE: 74 BPM | WEIGHT: 134 LBS | SYSTOLIC BLOOD PRESSURE: 102 MMHG | OXYGEN SATURATION: 98 % | HEIGHT: 57 IN | BODY MASS INDEX: 28.91 KG/M2 | DIASTOLIC BLOOD PRESSURE: 58 MMHG

## 2024-04-17 DIAGNOSIS — E11.3293 TYPE 2 DIABETES MELLITUS WITH BOTH EYES AFFECTED BY MILD NONPROLIFERATIVE RETINOPATHY WITHOUT MACULAR EDEMA, WITHOUT LONG-TERM CURRENT USE OF INSULIN (HCC): ICD-10-CM

## 2024-04-17 DIAGNOSIS — F32.4 MAJOR DEPRESSIVE DISORDER WITH SINGLE EPISODE, IN PARTIAL REMISSION (HCC): ICD-10-CM

## 2024-04-17 DIAGNOSIS — E78.5 HYPERLIPIDEMIA, UNSPECIFIED HYPERLIPIDEMIA TYPE: ICD-10-CM

## 2024-04-17 DIAGNOSIS — K74.60 CIRRHOSIS OF LIVER WITHOUT ASCITES, UNSPECIFIED HEPATIC CIRRHOSIS TYPE (HCC): Chronic | ICD-10-CM

## 2024-04-17 DIAGNOSIS — I50.9 HEART FAILURE, UNSPECIFIED HF CHRONICITY, UNSPECIFIED HEART FAILURE TYPE (HCC): ICD-10-CM

## 2024-04-17 DIAGNOSIS — H40.9 GLAUCOMA OF BOTH EYES, UNSPECIFIED GLAUCOMA TYPE: ICD-10-CM

## 2024-04-17 DIAGNOSIS — C68.9 UROTHELIAL CARCINOMA (HCC): ICD-10-CM

## 2024-04-17 DIAGNOSIS — N18.31 STAGE 3A CHRONIC KIDNEY DISEASE (HCC): Chronic | ICD-10-CM

## 2024-04-17 DIAGNOSIS — K21.9 GASTROESOPHAGEAL REFLUX DISEASE, UNSPECIFIED WHETHER ESOPHAGITIS PRESENT: ICD-10-CM

## 2024-04-17 DIAGNOSIS — Z01.818 PREOPERATIVE EXAMINATION: Primary | ICD-10-CM

## 2024-04-17 DIAGNOSIS — H26.9 CATARACT OF BOTH EYES, UNSPECIFIED CATARACT TYPE: ICD-10-CM

## 2024-04-17 DIAGNOSIS — F41.9 ANXIETY: ICD-10-CM

## 2024-04-17 DIAGNOSIS — M85.80 OSTEOPENIA, UNSPECIFIED LOCATION: ICD-10-CM

## 2024-04-17 DIAGNOSIS — I10 ESSENTIAL HYPERTENSION: ICD-10-CM

## 2024-04-17 DIAGNOSIS — E66.3 OVERWEIGHT (BMI 25.0-29.9): ICD-10-CM

## 2024-04-17 DIAGNOSIS — M15.9 PRIMARY OSTEOARTHRITIS INVOLVING MULTIPLE JOINTS: ICD-10-CM

## 2024-04-17 LAB
ATRIAL RATE: 69 BPM
P AXIS: 45 DEGREES
P-R INTERVAL: 150 MS
Q-T INTERVAL: 378 MS
QRS DURATION: 74 MS
QTC CALCULATION (BEZET): 405 MS
R AXIS: 17 DEGREES
T AXIS: 44 DEGREES
VENTRICULAR RATE: 69 BPM

## 2024-04-17 PROCEDURE — 3074F SYST BP LT 130 MM HG: CPT | Performed by: FAMILY MEDICINE

## 2024-04-17 PROCEDURE — 93000 ELECTROCARDIOGRAM COMPLETE: CPT | Performed by: FAMILY MEDICINE

## 2024-04-17 PROCEDURE — 99499 UNLISTED E&M SERVICE: CPT | Performed by: FAMILY MEDICINE

## 2024-04-17 PROCEDURE — 1159F MED LIST DOCD IN RCRD: CPT | Performed by: FAMILY MEDICINE

## 2024-04-17 PROCEDURE — 1160F RVW MEDS BY RX/DR IN RCRD: CPT | Performed by: FAMILY MEDICINE

## 2024-04-17 PROCEDURE — 99214 OFFICE O/P EST MOD 30 MIN: CPT | Performed by: FAMILY MEDICINE

## 2024-04-17 PROCEDURE — 3078F DIAST BP <80 MM HG: CPT | Performed by: FAMILY MEDICINE

## 2024-04-17 PROCEDURE — 3008F BODY MASS INDEX DOCD: CPT | Performed by: FAMILY MEDICINE

## 2024-04-17 RX ORDER — LISINOPRIL AND HYDROCHLOROTHIAZIDE 20; 12.5 MG/1; MG/1
1 TABLET ORAL DAILY
Qty: 90 TABLET | Refills: 1 | Status: SHIPPED | OUTPATIENT
Start: 2024-04-17

## 2024-04-17 NOTE — PATIENT INSTRUCTIONS
PATIENT INSTRUCTIONS    Thank you for seeing me today, it was a pleasure taking care of you.  Please check out at the  and schedule a follow up appointment.  Return in about 4 months (around 8/12/2024) for follow up.  Please remember that the anthony period for all appointments is 5 minutes. This is to help maximize the amount of time that we can spend together at our visits.    Please take new blood pressure medication: reduced dose lisinopril hydrochlorothiazide 20-12.5 mg daily  If blood pressures continue to remain low, can contact me  Night prior to the surgery, pause all meds except metoprolol  Must continue metoprolol prior to surgery      Dr. Abdulkadir Jamison

## 2024-04-17 NOTE — PROGRESS NOTES
Toya Mcdaniel  : 1945  MRN: [id number]      Preoperative Note       HPI  Toya Mcdaniel is a 78 year old female being seen today for a preoperative consult visit for the indication as per below:    Procedure: Cystoscopy, right retrograde, right ureteroscopy, possible biopsy, laser ablation of tumor, right ureteral stent   Date of Procedure: 24  Indication: Urothelial carcinoma  Surgeon: Dr Marv Chairez   Location: [facility]        #CKD  #Hydronephrosis  #Papillary urothelial carcinoma  -urology - Dr Torres, maternity leave  -urology Dr Chairez  -oncology Dr Pascual           #MDD - much improved  #Anxiety - improving  -she was feeling depressed - husbands death, sons health, her health  -mood is much better right now  -not interseted in therapy  -no SI or HI  -reports her son is more healthy  -still with financial difficulties   -reports recently improving     #DM  -Hba1c: 2023 5.8, 2024 5.5  -Microalbuminuria: 2024  -B12: Indicated  -Pneumonia vaccine: Pneumovax 2018, Prevnar 13 2019  -HepB: Indicated  -Eye exam: Dr Mejias  - needs to go see  -Diabetic foot exam:  2023 Bilateral barefoot skin diabetic exam is normal, visualized feet and the appearance is normal. Bilateral monofilament/sensation of both feet is normal. Pulsation pedal pulse exam of both lower legs/feet is normal as well.  -Current medications: metformin 1000 mg twice daily   -Blood sugars:         #Diastolic heart failure  -breathing better overall  -metoprolol 25 mg daily  -lisinopril hydrochlorothiazide 20-25 mg daily  -isosorbide  -aspirin 81 mg daily  -saw cardiology Dr Chin   -last seen by cardiology 10/2023 and cleared for surgery  -sometimes feeling dizzy - BP lately lower      #HLD  -atorvastatin 10 mg nightly     #Cataract  #Glaucoma  -travatan eye drops  -Dr Turcios - reports recently seen     #GERD  -taking pantoprazole 40 mg as needed     #Abnormal appearing liver -  cirrhotic morphology,  suspect fatty liver disease OLMOS  -follows with GI Dr Anaya     #Osteopenia  -DEXA scan 9/22/22 - osteopenia  -calcium and vitamin D     #Arthritis  -knees  -follows with ortho Dr Gifford   -using bengay  -voltaren gel  -only pain with walking  -he is walking ok, but sometimes limping because her knee is hurting     #Arthritis of hands and feet---resolved  -burning sensation L first toe  -reports intermittent hand pain       Cardiovascular:     Angina: No  Arrhythmias: No  CAD: No  Prior MI: No  Recent PCI: No  CHF: Yes Endocrinology:     Diabetes: Yes  Uses Insulin: No  Thyroid disease: No  Obesity: No Pulmonology:     Asthma: No  COPD: No  NAVEEN: No   Gastrointestinal:     Chronic Liver disease: Yes  Acute Hepatitis: No Musculoskeletal:     Neck Arthritis: No  TMJ: No  Wears Dentures: No Renal:     Renal Disease: Yes  Low serum albumin: No   Hematologic:     History of DVT or PE: No  Uses Anticoagulants: No Neurologic:     Seizure disorder: No  CVA history: No        Exercise Capacity: Toya Mcdaniel is able to climb >5 flights of stairs.   METs: >4      Prior anesthesia: Yes  Prior reactions to anesthesia: No   Family hx of anesthesia complications: No   Has secure and supportive home for post operative care.     NAVEEN screening:  S: (snoring) No  T: (tired/fatigued during daytime) No  O: (observed stop breathing/gasp @ night) No  P: (pressure=HTN) Yes  B: (BMI>35) No  A: (age>50) No  N: (neck size M >17 inch collar or 43cm, F >16 inch or 41cm) No  G: (Male) No      ROS:   GENERAL: No fever/chills, no recent weight loss  HEENT: No visual changes, no changes in hearing, no sore throats  NECK: No pain, no swelling  RESP: No cough, no SOB  CV: No chest pain, no palpitations  GI: No pain, no swelling  : No issues with urination  MSK: No edema  SKIN: No new rashes  NEURO: No numbness, no tingling    All other ROS reviewed and otherwise negative.    Allergies:   Allergies   Allergen Reactions    Adhesive Tape OTHER  (SEE COMMENTS)     Bandage skin turns black/blue, bruising    Gabapentin DIZZINESS, NAUSEA ONLY and UNKNOWN     Does not feel well, per pt        Medications:  Current Outpatient Medications   Medication Sig Dispense Refill    lisinopril-hydroCHLOROthiazide 20-12.5 MG Oral Tab Take 1 tablet by mouth daily. 90 tablet 1    metFORMIN HCl 1000 MG Oral Tab Take 1 tablet (1,000 mg total) by mouth 2 (two) times daily with meals. 180 tablet 0    ATORVASTATIN 10 MG Oral Tab TAKE 1 TABLET(10 MG) BY MOUTH EVERY NIGHT 90 tablet 3    isosorbide mononitrate ER 30 MG Oral Tablet 24 Hr Take 1 tablet (30 mg total) by mouth daily.      latanoprost 0.005 % Ophthalmic Solution Place into both eyes nightly.      METOPROLOL TARTRATE 25 MG Oral Tab TAKE 1 TABLET(25 MG) BY MOUTH TWICE DAILY 180 tablet 0    PANTOPRAZOLE 40 MG Oral Tab EC TAKE 1 TABLET(40 MG) BY MOUTH EVERY MORNING BEFORE BREAKFAST 90 tablet 0    acetaminophen 500 MG Oral Tab Take 1 tablet (500 mg total) by mouth every 6 (six) hours as needed for Pain.      aspirin 81 MG Oral Chew Tab Chew by mouth daily.      cholecalciferol 50 MCG (2000 UT) Oral Cap Take 1 capsule (2,000 Units total) by mouth daily.         Past Medical History:   Patient Active Problem List   Diagnosis    Type 2 diabetes mellitus with both eyes affected by mild nonproliferative retinopathy without macular edema, without long-term current use of insulin (HCC)    Essential hypertension    Glaucoma of both eyes    Overweight (BMI 25.0-29.9)    Primary osteoarthritis involving multiple joints    Cirrhosis of liver without ascites (HCC)    Osteopenia    CKD (chronic kidney disease) stage 3, GFR 30-59 ml/min (HCC)    Major depressive disorder with single episode, in partial remission (HCC)    Heart failure (HCC)    Hyperlipidemia    Health maintenance examination    Urothelial carcinoma (HCC)    Cataract    Gastroesophageal reflux disease     Past Medical History:    Calculus of kidney    Cataract    eye  --- surgery 11/1/22 left eye    CKD (chronic kidney disease) stage 3, GFR 30-59 ml/min (HCC)    Current moderate episode of major depressive disorder without prior episode (HCC)    Diverticulosis of colon    Essential hypertension    Glaucoma    Heart failure (HCC)    Hx of motion sickness    Hyperlipidemia    Muscle weakness    Osteoarthritis    Pyelonephritis    Type 2 diabetes mellitus with both eyes affected by mild nonproliferative retinopathy without macular edema, without long-term current use of insulin (HCC)    Urothelial carcinoma (HCC)    Visual impairment    Glasses       Past Surgical History:  Past Surgical History:   Procedure Laterality Date    Cataract Bilateral     Colonoscopy      Colonoscopy N/A 05/29/2019    Procedure: COLONOSCOPY;  Surgeon: Blade Anaya MD;  [facility]: [facility]    Egd  2019    Hysterectomy      Oophorectomy  1984       Past Family History:  Family History   Problem Relation Age of Onset    Cancer Mother         kidney    Diabetes Father     No Known Problems Sister     Diabetes Sister     Other (MVA) Sister     No Known Problems Sister     Other (Infant) Sister     No Known Problems Sister     No Known Problems Sister     No Known Problems Sister     No Known Problems Brother     No Known Problems Brother     Cancer Maternal Grandmother     No Known Problems Maternal Grandfather     No Known Problems Paternal Grandmother     Other (GSW) Paternal Grandfather     Colon Cancer Neg     Breast Cancer Neg        Social History:   Social History     Socioeconomic History    Marital status:      Spouse name: Not on file    Number of children: Not on file    Years of education: Not on file    Highest education level: Not on file   Occupational History    Not on file   Tobacco Use    Smoking status: Never    Smokeless tobacco: Never   Vaping Use    Vaping status: Never Used   Substance and Sexual Activity    Alcohol use: Never    Drug use: Never    Sexual activity: Not  Currently     Partners: Male   Other Topics Concern    Caffeine Concern Not Asked    Exercise Not Asked    Seat Belt Not Asked    Special Diet Not Asked    Stress Concern Not Asked    Weight Concern Not Asked   Social History Narrative    Relationships: .  passed away 2021.     Children: 3 kids - Ovidio Cage, Jean Marie - all adults.     Pets: None    School: N/A    Work: Retired. Previously self - employed  and .     Origin: Born in [place], came to [place] in 1961.     Interests: Enjoys talking on phone with friends, enjoys walking around the neighborhood. Enjoys gardening.     Spiritual: Believes in God but does not go to [employer].      Social Determinants of Health     Financial Resource Strain: Not on file   Food Insecurity: Not on file   Transportation Needs: Not on file   Physical Activity: Not on file   Stress: Not on file   Social Connections: Not on file   Housing Stability: Not on file        Physical Exam:   Vitals:    04/17/24 0957   BP: 102/58   Pulse: 74   Temp: 98.2 °F (36.8 °C)   SpO2: 98%   Weight: 134 lb (60.8 kg)   Height: 4' 9\" (1.448 m)      Body mass index is 29 kg/m².  GENERAL: NAD  HEENT: Moist mucous membranes, no tonsillar swelling or erythema, PERRLA bilat, TM translucent and non-bulging  NECK: Supple, non-tender  RESP: CTAB, no wheezing, no rales, no ronchi  CV: RRR, no murmurs  GI: Soft, non-distended, non-tender, no guarding, no rebound, no masses  MSK: No edema  SKIN: Warm and dry, no rashes  NEURO: Answering questions appropriately      Procedures:  EKG normal sinus rhythm, rate of 69, normal axis, normal EKG    Assessment/ Plan:     Urothelial carcinoma (HCC)  Following with urology-advised that she have physician send over records to my office so that I can continue to follow with treatment course.  Also seeing oncology now.  Has plans for cystoscopy.    Type 2 diabetes mellitus with both eyes affected by mild nonproliferative retinopathy  without macular edema, without long-term current use of insulin (HCC)  Diabetes overall controlled.  Continue metformin at 1000 mg twice daily  Need to obtain diabetic eye report  Follow-up in 4 months  Can pause metformin night prior to surgery    Major depressive disorder with single episode, in partial remission (HCC)  Mood is improved at this time.   Doing very well now  Will continue to monitor.   Follow-up as needed.    Heart failure (HCC)  Following with cardiology.  Blood pressure lately has been low.  Reduce lisinopril hydrochlorothiazide to 20-12.5 mg daily.   Continue metoprolol tartrate 25 mg daily.  Has isosorbide as needed.  Can pause lisinopril hydrochlorothiazide prior to surgery; should continue metoprolol.     CKD (chronic kidney disease) stage 3, GFR 30-59 ml/min (HCC)  Avoid nephrotoxic medications  Following with urology    Cirrhosis of liver without ascites (HCC)  History of cirrhotic morphology, with suspicion of fatty liver disease OLMOS  Follows with GI    Hyperlipidemia  Continue atorvastatin 10 mg nightly.  Can pause prior to surgery    Essential hypertension  Blood pressure lately has been low.  Reduce lisinopril hydrochlorothiazide to 20-12.5 mg daily.   Continue metoprolol tartrate 25 mg daily.  Can pause lisinopril hydrochlorothiazide prior to surgery; should continue metoprolol.     Overweight (BMI 25.0-29.9)  Healthy diet and exercise advised.  Has been working on this and has been slowly losing some weight.     Anxiety  Doing well, no further issues.     Gastroesophageal reflux disease  History of acid reflux, generally well controlled.  Periodically using pantoprazole only as needed    Primary osteoarthritis involving multiple joints  History of osteoarthritis of her knees.   Suspect arthritis of the hands and feet as well.   Overall walking ok - climbs stairs for exercise everyday  Using Voltaren gel.  History of CKD.  Avoid oral NSAIDs.  Monitor    Osteopenia  Continue calcium and  vitamin D.  Continue mild weight bearing exercises - walking, climbing stairs.    Glaucoma of both eyes  History of glaucoma, on Travatan ophthalmic solution.  Advise close follow up with ophthalmology     Cataract  History of cataracts, history of cataract surgery bilateral eyes.  Follows with ophthalmology.  Doing well at this time.        Pre Operative Clearance Plan:  Cardiovascular risk by type of surgery: Low risk   ASA classification of physical status of patient: ASA 2 - Mild to moderate systemic disease caused by the surgical condition or by other pathological process, and medically well controlled  Revised Cardiac Risk Index: Class II Risk - 6.0% 30-day risk of death, MI, or cardiac arrest  NAVEEN Screenin-2 Low risk of NAVEEN   Labs/testing: CBC, CMP reviewed. EKG reviewed.   Medically optimized for surgery: Yes    Will fax note to presurgical center and requesting provider   Dr Mihaela ZAPATA [phone]    William Panchal MD  Family Medicine

## 2024-04-17 NOTE — ASSESSMENT & PLAN NOTE
History of osteoarthritis of her knees.   Suspect arthritis of the hands and feet as well.   Overall walking ok - climbs stairs for exercise everyday  Using Voltaren gel.  History of CKD.  Avoid oral NSAIDs.  Monitor

## 2024-04-17 NOTE — ASSESSMENT & PLAN NOTE
Diabetes overall controlled.  Continue metformin at 1000 mg twice daily  Need to obtain diabetic eye report  Follow-up in 4 months  Can pause metformin night prior to surgery

## 2024-04-17 NOTE — ASSESSMENT & PLAN NOTE
Mood is improved at this time.   Doing very well now  Will continue to monitor.   Follow-up as needed.

## 2024-04-17 NOTE — ASSESSMENT & PLAN NOTE
Following with cardiology.  Blood pressure lately has been low.  Reduce lisinopril hydrochlorothiazide to 20-12.5 mg daily.   Continue metoprolol tartrate 25 mg daily.  Has isosorbide as needed.  Can pause lisinopril hydrochlorothiazide prior to surgery; should continue metoprolol.

## 2024-04-17 NOTE — ASSESSMENT & PLAN NOTE
Following with urology-advised that she have physician send over records to my office so that I can continue to follow with treatment course.  Also seeing oncology now.  Has plans for cystoscopy.

## 2024-04-17 NOTE — ASSESSMENT & PLAN NOTE
Blood pressure lately has been low.  Reduce lisinopril hydrochlorothiazide to 20-12.5 mg daily.   Continue metoprolol tartrate 25 mg daily.  Can pause lisinopril hydrochlorothiazide prior to surgery; should continue metoprolol.

## 2024-04-23 ENCOUNTER — TELEPHONE (OUTPATIENT)
Dept: INTERNAL MEDICINE CLINIC | Facility: CLINIC | Age: 79
End: 2024-04-23

## 2024-04-23 DIAGNOSIS — I50.9 HEART FAILURE, UNSPECIFIED HF CHRONICITY, UNSPECIFIED HEART FAILURE TYPE (HCC): ICD-10-CM

## 2024-04-23 DIAGNOSIS — I50.9 HEART FAILURE, UNSPECIFIED HF CHRONICITY, UNSPECIFIED HEART FAILURE TYPE (HCC): Primary | ICD-10-CM

## 2024-04-23 DIAGNOSIS — I10 ESSENTIAL HYPERTENSION: ICD-10-CM

## 2024-04-23 RX ORDER — LISINOPRIL 20 MG/1
20 TABLET ORAL DAILY
Qty: 90 TABLET | Refills: 0 | Status: SHIPPED | OUTPATIENT
Start: 2024-04-23

## 2024-04-23 NOTE — TELEPHONE ENCOUNTER
Spoke to patient. Still slightly lightheaded at times. Discontinue lisinopril hydrochlorothiazide 20-12.5 mg. Will switch to just lisinopril 20 mg daily.

## 2024-04-23 NOTE — TELEPHONE ENCOUNTER
Patient called for a refill,    Metoprolol Tartrate 25 MG Oral Tab    [id number] - [facility], [place] - [address], [phone], [phone]   [address] [facility] [address]   Phone: [phone] Fax: [phone]   Hours: Not open 24 hours

## 2024-04-23 NOTE — TELEPHONE ENCOUNTER
Attempted to contact pt. Unable to reach. Message left on voicemail to call and speak with the nurse.

## 2024-04-23 NOTE — TELEPHONE ENCOUNTER
Patient called as her blood pressure is very low.    Last reading was:  106/57    Before that it was; 116/54.    Asking for a call back from the nurse.

## 2024-04-23 NOTE — TELEPHONE ENCOUNTER
Patient called office and call transferred to triage nurse. Pt verbalizes concern for her B/PP being \"too low\" (106/59 and 115/ 48) even after Dr Panchal lowered her antihypertensive Rx dose on 4/17 (lisinopril -hydrochlorothiazide  from 20-25 mg to 20-12.5 mg).      Denies currently feeling dizzy, lightheaded, confused, denies headache, blurred vision, nausea, palpitations. States did have some dizziness previously (before seeing Dr Talley last week and dose alteration)    Discussed how she takes her B/P readings- positionally (not consistently in terms of positioning, same arm) and also not always pre medication and at least 1 hr post anti-HTN med. Informed her her B/P last 2 times in office in same low/ norm range  102/58, 105/32.    Pt agrees she will take readings more consistently and ck position/ note pre/post med timing. She will call back if she has any symptomatic B/P readings, mulugeta if B/P is 90/50 or lower in which case she will hold the med dose until she calls triage/ Dr Panchal.    Message routed to Dr Panchal as LEONARDO given pt concern.

## 2024-05-07 ENCOUNTER — OFFICE VISIT (OUTPATIENT)
Dept: HEMATOLOGY/ONCOLOGY | Facility: HOSPITAL | Age: 79
End: 2024-05-07
Attending: INTERNAL MEDICINE
Payer: MEDICARE

## 2024-05-07 VITALS
HEART RATE: 56 BPM | WEIGHT: 135 LBS | TEMPERATURE: 98 F | OXYGEN SATURATION: 96 % | HEIGHT: 57 IN | SYSTOLIC BLOOD PRESSURE: 107 MMHG | BODY MASS INDEX: 29.12 KG/M2 | DIASTOLIC BLOOD PRESSURE: 38 MMHG | RESPIRATION RATE: 18 BRPM

## 2024-05-07 DIAGNOSIS — C68.9 UROTHELIAL CARCINOMA (HCC): Primary | ICD-10-CM

## 2024-05-07 DIAGNOSIS — C65.1 MALIGNANT NEOPLASM OF RIGHT RENAL PELVIS (HCC): ICD-10-CM

## 2024-05-07 PROCEDURE — 99214 OFFICE O/P EST MOD 30 MIN: CPT | Performed by: INTERNAL MEDICINE

## 2024-05-07 NOTE — PROGRESS NOTES
Oncology note    Requesting: Dr. 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 Marv Chairez originally diagnosed November 2022    Multiple positive biopsies. October 2023 focal concern for high-grade disease    Patient was offered nephro ureterectomy however was concerned about renal function and has decided to not proceed with surgery      She feels well denies hematuria    Past Medical History:    Calculus of kidney    Cataract    eye surgery 11/1/22 left eye    CKD (chronic kidney disease) stage 3, GFR 30-59 ml/min (HCC)    Current moderate episode of major depressive disorder without prior episode (HCC)    Diverticulosis of colon    Essential hypertension    Glaucoma    Heart failure (HCC)    Hx of motion sickness    Hyperlipidemia    Muscle weakness    Osteoarthritis    Pyelonephritis    Type 2 diabetes mellitus with both eyes affected by mild nonproliferative retinopathy without macular edema, without long-term current use of insulin (HCC)    Urothelial carcinoma (HCC)    Visual impairment    Glasses     Family History   Problem Relation Age of Onset    Cancer Mother         kidney    Diabetes Father     No Known Problems Sister     Diabetes Sister     Other (MVA) Sister     No Known Problems Sister     Other (Infant) Sister     No Known Problems Sister     No Known Problems Sister     No Known Problems Sister     No Known Problems Brother     No Known Problems Brother     Cancer Maternal Grandmother     No Known Problems Maternal Grandfather     No Known Problems Paternal Grandmother     Other (GSW) Paternal Grandfather     Colon Cancer Neg     Breast Cancer Neg      Social History     Socioeconomic History    Marital status:    Tobacco Use    Smoking status: Never    Smokeless tobacco: Never   Vaping Use    Vaping status: Never Used    Substance and Sexual Activity    Alcohol use: Never    Drug use: Never    Sexual activity: Not Currently     Partners: Male     Current Outpatient Medications on File Prior to Visit   Medication Sig Dispense Refill    lisinopril 20 MG Oral Tab Take 1 tablet (20 mg total) by mouth daily. 90 tablet 0    metoprolol tartrate 25 MG Oral Tab Take 1 tablet (25 mg total) by mouth 2 (two) times daily. 180 tablet 3    metFORMIN HCl 1000 MG Oral Tab Take 1 tablet (1,000 mg total) by mouth 2 (two) times daily with meals. 180 tablet 0    ATORVASTATIN 10 MG Oral Tab TAKE 1 TABLET(10 MG) BY MOUTH EVERY NIGHT 90 tablet 3    isosorbide mononitrate ER 30 MG Oral Tablet 24 Hr Take 1 tablet (30 mg total) by mouth daily.      latanoprost 0.005 % Ophthalmic Solution Place into both eyes nightly.      PANTOPRAZOLE 40 MG Oral Tab EC TAKE 1 TABLET(40 MG) BY MOUTH EVERY MORNING BEFORE BREAKFAST 90 tablet 0    acetaminophen 500 MG Oral Tab Take 1 tablet (500 mg total) by mouth every 6 (six) hours as needed for Pain.      aspirin 81 MG Oral Chew Tab Chew by mouth daily.      cholecalciferol 50 MCG (2000 UT) Oral Cap Take 1 capsule (2,000 Units total) by mouth daily.       Current Facility-Administered Medications on File Prior to Visit   Medication Dose Route Frequency Provider Last Rate Last Admin    [COMPLETED] iopamidol 76% (ISOVUE-370) injection for power injector  80 mL Intravenous ONCE PRN Shukri Pascual MD   80 mL at 04/15/24 0920     No results found for: \"ALLALTERNATA\", \"ALLAFUMIGAT\", \"ALLBOXELDER\", \"ALLBERMGRASS\", \"ALLCATDANDER\", \"ALLCEDAR\", \"ALLCLADOHERB\", \"ALLROACH\", \"ALLCOTTWOOD\", \"ALLDPTERONY\" No results found for: \"ALLDFARINAE\", \"ALLDOGDANDER\", \"ALLELMTREE\", \"ALLHICKORY\", \"ALLMARSHELD\", \"ALLMOUSEEPI\", \"ALLMUCORRACE\", \"ALLMULBERRY\", \"ALLOAKTREE\", \"ALLPENICNOTA\"  No results found for: \"ALLCPIGWEED\", \"ALLCRAGWEED\", \"ALLRUSTHIST\", \"ALLSYCAMORE\", \"ALLTIMOTGR\", \"ALLWALNTTREE\", \"ALLWHITEASH\", \"IGE\"  Vitals:    05/07/24 1202   BP:  107/38   Pulse: 56   Resp: 18   Temp: 98.3 °F (36.8 °C)     NAD,RR, ND no edema moving all extremities no deformity no lymphadenopathy normal mood    A/P:  78 year old 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  Multiple positive biopsies including October 2023 focal concern for high-grade disease   -the patient has expressed that she is not interested in after nephroureterectomy  - no metastasis or nodes on imaging     HG disease comfirmed on repeast scope 4/24.   Rec CT with nephro ureterectomy she is now more agreeable she will meet with urology this month we discussed the case with urology Dr. Chairez

## 2024-05-21 ENCOUNTER — TELEPHONE (OUTPATIENT)
Dept: INTERNAL MEDICINE CLINIC | Facility: CLINIC | Age: 79
End: 2024-05-21

## 2024-05-21 DIAGNOSIS — Z01.818 PREOP EXAMINATION: Primary | ICD-10-CM

## 2024-05-21 NOTE — TELEPHONE ENCOUNTER
Patient has a surgery scheduled for the 24, told to get a pre op 2 weeks prior and she would like to do the blood work prior to the appt.    Patient is checking with surgical office if they have any specific forms/labs they would like completed.

## 2024-05-21 NOTE — TELEPHONE ENCOUNTER
I called and spoke to the patient to let her know Dr. Panchal ordered labs for her to have completed before she sees him on 6/10.

## 2024-05-21 NOTE — TELEPHONE ENCOUNTER
Addendum: received preop request.    Requesting, CBC, BMP, PT/INR, PTT, EKG, urine culture - will order for patient to get done.  Has had EKG 4/17/24, so will hold off on getting another EKG at this time.     Please notify patient of testing that needs to be completed prior to appointment.     Thanks!  William

## 2024-06-04 ENCOUNTER — LAB ENCOUNTER (OUTPATIENT)
Dept: LAB | Age: 79
End: 2024-06-04
Attending: FAMILY MEDICINE
Payer: MEDICARE

## 2024-06-04 LAB
ANION GAP SERPL CALC-SCNC: 5 MMOL/L (ref 0–18)
APTT PPP: 29.9 SECONDS (ref 23–36)
BASOPHILS # BLD AUTO: 0.05 X10(3) UL (ref 0–0.2)
BASOPHILS NFR BLD AUTO: 0.8 %
BUN BLD-MCNC: 24 MG/DL (ref 9–23)
BUN/CREAT SERPL: 25.5 (ref 10–20)
CALCIUM BLD-MCNC: 9.7 MG/DL (ref 8.7–10.4)
CHLORIDE SERPL-SCNC: 108 MMOL/L (ref 98–112)
CO2 SERPL-SCNC: 27 MMOL/L (ref 21–32)
CREAT BLD-MCNC: 0.94 MG/DL
DEPRECATED RDW RBC AUTO: 45.2 FL (ref 35.1–46.3)
EGFRCR SERPLBLD CKD-EPI 2021: 63 ML/MIN/1.73M2 (ref 60–?)
EOSINOPHIL # BLD AUTO: 0.3 X10(3) UL (ref 0–0.7)
EOSINOPHIL NFR BLD AUTO: 4.7 %
ERYTHROCYTE [DISTWIDTH] IN BLOOD BY AUTOMATED COUNT: 13.8 % (ref 11–15)
FASTING STATUS PATIENT QL REPORTED: YES
GLUCOSE BLD-MCNC: 114 MG/DL (ref 70–99)
HCT VFR BLD AUTO: 33.3 %
HGB BLD-MCNC: 10.3 G/DL
IMM GRANULOCYTES # BLD AUTO: 0.02 X10(3) UL (ref 0–1)
IMM GRANULOCYTES NFR BLD: 0.3 %
INR BLD: 1.01 (ref 0.8–1.2)
LYMPHOCYTES # BLD AUTO: 2.44 X10(3) UL (ref 1–4)
LYMPHOCYTES NFR BLD AUTO: 38.5 %
MCH RBC QN AUTO: 27.8 PG (ref 26–34)
MCHC RBC AUTO-ENTMCNC: 30.9 G/DL (ref 31–37)
MCV RBC AUTO: 89.8 FL
MONOCYTES # BLD AUTO: 0.49 X10(3) UL (ref 0.1–1)
MONOCYTES NFR BLD AUTO: 7.7 %
NEUTROPHILS # BLD AUTO: 3.04 X10 (3) UL (ref 1.5–7.7)
NEUTROPHILS # BLD AUTO: 3.04 X10(3) UL (ref 1.5–7.7)
NEUTROPHILS NFR BLD AUTO: 48 %
OSMOLALITY SERPL CALC.SUM OF ELEC: 295 MOSM/KG (ref 275–295)
PLATELET # BLD AUTO: 387 10(3)UL (ref 150–450)
POTASSIUM SERPL-SCNC: 5.1 MMOL/L (ref 3.5–5.1)
PROTHROMBIN TIME: 13.9 SECONDS (ref 11.6–14.8)
RBC # BLD AUTO: 3.71 X10(6)UL
SODIUM SERPL-SCNC: 140 MMOL/L (ref 136–145)
WBC # BLD AUTO: 6.3 X10(3) UL (ref 4–11)

## 2024-06-04 PROCEDURE — 85610 PROTHROMBIN TIME: CPT | Performed by: FAMILY MEDICINE

## 2024-06-04 PROCEDURE — 80048 BASIC METABOLIC PNL TOTAL CA: CPT | Performed by: FAMILY MEDICINE

## 2024-06-04 PROCEDURE — 87077 CULTURE AEROBIC IDENTIFY: CPT | Performed by: FAMILY MEDICINE

## 2024-06-04 PROCEDURE — 87086 URINE CULTURE/COLONY COUNT: CPT | Performed by: FAMILY MEDICINE

## 2024-06-04 PROCEDURE — 85025 COMPLETE CBC W/AUTO DIFF WBC: CPT | Performed by: FAMILY MEDICINE

## 2024-06-04 PROCEDURE — 36415 COLL VENOUS BLD VENIPUNCTURE: CPT | Performed by: FAMILY MEDICINE

## 2024-06-04 PROCEDURE — 85730 THROMBOPLASTIN TIME PARTIAL: CPT | Performed by: FAMILY MEDICINE

## 2024-06-05 DIAGNOSIS — N30.00 ACUTE CYSTITIS WITHOUT HEMATURIA: Primary | ICD-10-CM

## 2024-06-05 RX ORDER — CEPHALEXIN 500 MG/1
500 CAPSULE ORAL 2 TIMES DAILY
Qty: 10 CAPSULE | Refills: 0 | Status: SHIPPED | OUTPATIENT
Start: 2024-06-05 | End: 2024-06-10

## 2024-06-10 ENCOUNTER — OFFICE VISIT (OUTPATIENT)
Dept: INTERNAL MEDICINE CLINIC | Facility: CLINIC | Age: 79
End: 2024-06-10
Payer: COMMERCIAL

## 2024-06-10 VITALS
TEMPERATURE: 98 F | WEIGHT: 138 LBS | HEIGHT: 57 IN | OXYGEN SATURATION: 98 % | SYSTOLIC BLOOD PRESSURE: 126 MMHG | HEART RATE: 74 BPM | BODY MASS INDEX: 29.77 KG/M2 | DIASTOLIC BLOOD PRESSURE: 74 MMHG

## 2024-06-10 DIAGNOSIS — C68.9 UROTHELIAL CARCINOMA (HCC): ICD-10-CM

## 2024-06-10 DIAGNOSIS — M15.9 PRIMARY OSTEOARTHRITIS INVOLVING MULTIPLE JOINTS: ICD-10-CM

## 2024-06-10 DIAGNOSIS — H26.9 CATARACT OF BOTH EYES, UNSPECIFIED CATARACT TYPE: ICD-10-CM

## 2024-06-10 DIAGNOSIS — N18.31 STAGE 3A CHRONIC KIDNEY DISEASE (HCC): Chronic | ICD-10-CM

## 2024-06-10 DIAGNOSIS — E11.3293 TYPE 2 DIABETES MELLITUS WITH BOTH EYES AFFECTED BY MILD NONPROLIFERATIVE RETINOPATHY WITHOUT MACULAR EDEMA, WITHOUT LONG-TERM CURRENT USE OF INSULIN (HCC): ICD-10-CM

## 2024-06-10 DIAGNOSIS — F32.4 MAJOR DEPRESSIVE DISORDER WITH SINGLE EPISODE, IN PARTIAL REMISSION (HCC): ICD-10-CM

## 2024-06-10 DIAGNOSIS — N30.00 ACUTE CYSTITIS WITHOUT HEMATURIA: ICD-10-CM

## 2024-06-10 DIAGNOSIS — M85.80 OSTEOPENIA, UNSPECIFIED LOCATION: ICD-10-CM

## 2024-06-10 DIAGNOSIS — H40.9 GLAUCOMA OF BOTH EYES, UNSPECIFIED GLAUCOMA TYPE: ICD-10-CM

## 2024-06-10 DIAGNOSIS — E78.5 HYPERLIPIDEMIA, UNSPECIFIED HYPERLIPIDEMIA TYPE: ICD-10-CM

## 2024-06-10 DIAGNOSIS — K74.60 CIRRHOSIS OF LIVER WITHOUT ASCITES, UNSPECIFIED HEPATIC CIRRHOSIS TYPE (HCC): Chronic | ICD-10-CM

## 2024-06-10 DIAGNOSIS — Z01.818 PREOPERATIVE EXAMINATION: Primary | ICD-10-CM

## 2024-06-10 DIAGNOSIS — I50.9 HEART FAILURE, UNSPECIFIED HF CHRONICITY, UNSPECIFIED HEART FAILURE TYPE (HCC): ICD-10-CM

## 2024-06-10 DIAGNOSIS — I10 ESSENTIAL HYPERTENSION: ICD-10-CM

## 2024-06-10 DIAGNOSIS — K21.9 GASTROESOPHAGEAL REFLUX DISEASE, UNSPECIFIED WHETHER ESOPHAGITIS PRESENT: ICD-10-CM

## 2024-06-10 NOTE — PROGRESS NOTES
Toya Mcdaniel  : 1945  MRN: [id number]      Preoperative Note       HPI  Toya Mcdaniel is a 78 year old female being seen today for a preoperative consult visit for the indication as per below:    Procedure: Robotic assisted laparoscopic right nephroureterectomy, possible conversion to open procedure   Date of Procedure: 24  Indication: Papillary Urothelial Carcinoma  Surgeon: Dr Torres   Location: [facility]       #UTI  -urine culture - [id number] non hemolytic strep  -completing course of cephalexin, today is last day     #CKD  #Hydronephrosis  #Papillary urothelial carcinoma  -urology - Dr Torres, maternity leave, now back   -urology Dr Chairez  -oncology Dr Pascual       #MDD - much improved  #Anxiety - improving  -she was feeling depressed - husbands death, sons health, her health  -mood is much better right now  -not interseted in therapy  -no SI or HI  -reports her son is more healthy  -still with financial difficulties   -reports recently improving     #DM  -Hba1c: 2023 5.8, 2024 5.5  -Microalbuminuria: 2024  -B12: Indicated  -Pneumonia vaccine: Pneumovax 2018, Prevnar 13 2019  -HepB: Indicated  -Eye exam: Dr Mejias  - needs to go see  -Diabetic foot exam:  2023 Bilateral barefoot skin diabetic exam is normal, visualized feet and the appearance is normal. Bilateral monofilament/sensation of both feet is normal. Pulsation pedal pulse exam of both lower legs/feet is normal as well.  -Current medications: metformin 1000 mg twice daily   -Blood sugars:      #Diastolic heart failure  -breathing better overall  -metoprolol 25 mg daily  -lisinopril 20 mg daily  -isosorbide  -aspirin 81 mg daily  -saw cardiology Dr Chin   -last seen by cardiology 10/2023 and cleared for surgery  -sometimes feeling dizzy - BP lately lower      #HLD  -atorvastatin 10 mg nightly     #Cataract  #Glaucoma  -travatan eye drops  -Dr Turcios - reports recently seen      #GERD  -taking pantoprazole 40 mg as  needed     #Abnormal appearing liver -  cirrhotic morphology, suspect fatty liver disease OLMOS  -follows with GI Dr Anaya     #Osteopenia  -DEXA scan 9/22/22 - osteopenia  -calcium and vitamin D     #Arthritis  -knees  -follows with ortho Dr Gifford   -using bengay  -voltaren gel  -only pain with walking  -he is walking ok, but sometimes limping because her knee is hurting      Cardiovascular:     Angina: No  Arrhythmias: No  CAD: No  Prior MI: No  Recent PCI: No  CHF: Yes Endocrinology:     Diabetes: Yes  Uses Insulin: No  Thyroid disease: No  Obesity: No Pulmonology:     Asthma: No  COPD: No  NAVEEN: No   Gastrointestinal:     Chronic Liver disease: Yes  Acute Hepatitis: No Musculoskeletal:     Neck Arthritis: No  TMJ: No  Wears Dentures: No Renal:     Renal Disease: Yes  Low serum albumin: No   Hematologic:     History of DVT or PE: No  Uses Anticoagulants: No Neurologic:     Seizure disorder: No  CVA history: No        Exercise Capacity: Toya Mcdaniel is able to climb >2 flights of stairs.   METs: >4    Prior anesthesia: Yes  Prior reactions to anesthesia: No   Family hx of anesthesia complications: No   Has secure and supportive home for post operative care.     NAVEEN screening:  S: (snoring) No  T: (tired/fatigued during daytime) No  O: (observed stop breathing/gasp @ night) No  P: (pressure=HTN) Yes  B: (BMI>35) No  A: (age>50) No  N: (neck size M >17 inch collar or 43cm, F >16 inch or 41cm) No  G: (Male) No       ROS:   GENERAL: No fever/chills, no recent weight loss  HEENT: No visual changes, no changes in hearing, no sore throats  NECK: No pain, no swelling  RESP: No cough, no SOB  CV: No chest pain, no palpitations  GI: No pain, no swelling  : No issues with urination  MSK: No edema  SKIN: No new rashes  NEURO: No numbness, no tingling    All other ROS reviewed and otherwise negative.    Allergies:   Allergies   Allergen Reactions    Adhesive Tape OTHER (SEE COMMENTS)     Bandage skin turns black/blue,  bruising    Gabapentin DIZZINESS, NAUSEA ONLY and UNKNOWN     Does not feel well, per pt        Medications:  Current Outpatient Medications   Medication Sig Dispense Refill    lisinopril 20 MG Oral Tab Take 1 tablet (20 mg total) by mouth daily. 90 tablet 0    metoprolol tartrate 25 MG Oral Tab Take 1 tablet (25 mg total) by mouth 2 (two) times daily. 180 tablet 3    metFORMIN HCl 1000 MG Oral Tab Take 1 tablet (1,000 mg total) by mouth 2 (two) times daily with meals. 180 tablet 0    ATORVASTATIN 10 MG Oral Tab TAKE 1 TABLET(10 MG) BY MOUTH EVERY NIGHT 90 tablet 3    isosorbide mononitrate ER 30 MG Oral Tablet 24 Hr Take 1 tablet (30 mg total) by mouth daily.      latanoprost 0.005 % Ophthalmic Solution Place into both eyes nightly.      PANTOPRAZOLE 40 MG Oral Tab EC TAKE 1 TABLET(40 MG) BY MOUTH EVERY MORNING BEFORE BREAKFAST 90 tablet 0    acetaminophen 500 MG Oral Tab Take 1 tablet (500 mg total) by mouth every 6 (six) hours as needed for Pain.      aspirin 81 MG Oral Chew Tab Chew by mouth daily.      cholecalciferol 50 MCG (2000 UT) Oral Cap Take 1 capsule (2,000 Units total) by mouth daily.         Past Medical History:   Patient Active Problem List   Diagnosis    Type 2 diabetes mellitus with both eyes affected by mild nonproliferative retinopathy without macular edema, without long-term current use of insulin (HCC)    Essential hypertension    Glaucoma of both eyes    Overweight (BMI 25.0-29.9)    Primary osteoarthritis involving multiple joints    Cirrhosis of liver without ascites (HCC)    Osteopenia    CKD (chronic kidney disease) stage 3, GFR 30-59 ml/min (HCC)    Major depressive disorder with single episode, in partial remission (HCC)    Heart failure (HCC)    Hyperlipidemia    Health maintenance examination    Urothelial carcinoma (HCC)    Cataract    Gastroesophageal reflux disease    Acute cystitis without hematuria     Past Medical History:    Calculus of kidney    Cataract    eye surgery 11/1/22  left eye    CKD (chronic kidney disease) stage 3, GFR 30-59 ml/min (HCC)    Current moderate episode of major depressive disorder without prior episode (HCC)    Diverticulosis of colon    Essential hypertension    Glaucoma    Heart failure (HCC)    Hx of motion sickness    Hyperlipidemia    Muscle weakness    Osteoarthritis    Pyelonephritis    Type 2 diabetes mellitus with both eyes affected by mild nonproliferative retinopathy without macular edema, without long-term current use of insulin (HCC)    Urothelial carcinoma (HCC)    Visual impairment    Glasses       Past Surgical History:  Past Surgical History:   Procedure Laterality Date    Cataract Bilateral     Colonoscopy      Colonoscopy N/A 05/29/2019    Procedure: COLONOSCOPY;  Surgeon: Blade Anaya MD;  [facility]: [facility]    Egd  2019    Hysterectomy      Oophorectomy  1984       Past Family History:  Family History   Problem Relation Age of Onset    Cancer Mother         kidney    Diabetes Father     No Known Problems Sister     Diabetes Sister     Other (MVA) Sister     No Known Problems Sister     Other (Infant) Sister     No Known Problems Sister     No Known Problems Sister     No Known Problems Sister     No Known Problems Brother     No Known Problems Brother     Cancer Maternal Grandmother     No Known Problems Maternal Grandfather     No Known Problems Paternal Grandmother     Other (GSW) Paternal Grandfather     Colon Cancer Neg     Breast Cancer Neg        Social History:   Social History     Socioeconomic History    Marital status:      Spouse name: Not on file    Number of children: Not on file    Years of education: Not on file    Highest education level: Not on file   Occupational History    Not on file   Tobacco Use    Smoking status: Never    Smokeless tobacco: Never   Vaping Use    Vaping status: Never Used   Substance and Sexual Activity    Alcohol use: Never    Drug use: Never    Sexual activity: Not Currently      Partners: Male   Other Topics Concern    Caffeine Concern Not Asked    Exercise Not Asked    Seat Belt Not Asked    Special Diet Not Asked    Stress Concern Not Asked    Weight Concern Not Asked   Social History Narrative    Relationships: .  passed away 2021.     Children: 3 kids - Ovidio Cage, Jean Marie - all adults.     Pets: None    School: N/A    Work: Retired. Previously self - employed  and .     Origin: Born in [place], came to [place] in 1961.     Interests: Enjoys talking on phone with friends, enjoys walking around the neighborhood. Enjoys gardening.     Spiritual: Believes in God but does not go to [employer].      Social Determinants of Health     Financial Resource Strain: Not on file   Food Insecurity: Not on file   Transportation Needs: Not on file   Physical Activity: Not on file   Stress: Not on file   Social Connections: Not on file   Housing Stability: Not on file        Physical Exam:   Vitals:    06/10/24 0944   BP: 126/74   Pulse: 74   Temp: 98.4 °F (36.9 °C)   SpO2: 98%   Weight: 138 lb (62.6 kg)   Height: 4' 9\" (1.448 m)      Body mass index is 29.86 kg/m².  GENERAL: NAD  HEENT: Moist mucous membranes, no tonsillar swelling or erythema, PERRLA bilat, TM translucent and non-bulging  NECK: Supple, non-tender  RESP: CTAB, no wheezing, no rales, no ronchi  CV: RRR, no murmurs  GI: Soft, non-distended, non-tender, no guarding, no rebound, no masses  MSK: No edema  SKIN: Warm and dry, no rashes  NEURO: Answering questions appropriately      Procedures:  None    Assessment/ Plan:     Cirrhosis of liver without ascites (HCC)  History of cirrhotic morphology, with suspicion of fatty liver disease OLMOS  Follows with GI    CKD (chronic kidney disease) stage 3, GFR 30-59 ml/min (HCC)  Avoid nephrotoxic medications  Following with urology    Heart failure (HCC)  Following with cardiology.  Continue lisinopril 20 mg daily   Continue metoprolol tartrate 25 mg  daily.  Can continue aspirin 81 mg daily.   Has isosorbide as needed.  Can pause lisinopril prior to surgery; should continue metoprolol.   Pause aspirin 81 mg daily 1 week prior to surgery     Major depressive disorder with single episode, in partial remission (HCC)  Mood is improved at this time.   Doing very well now  Will continue to monitor.   Follow-up as needed.    Type 2 diabetes mellitus with both eyes affected by mild nonproliferative retinopathy without macular edema, without long-term current use of insulin (HCC)  Diabetes overall controlled.  Continue metformin at 1000 mg twice daily  Need to obtain diabetic eye report  Follow-up in 3 months  Can pause metformin night prior to surgery    Urothelial carcinoma (HCC)  Following with urology  Also seeing oncology now.  Now with plans for nephroureterectomy     Essential hypertension  Blood pressure controlled.  Continue lisinopril 20 mg daily.   Continue metoprolol tartrate 25 mg daily.  Can pause lisinopril prior to surgery; should continue metoprolol.     Hyperlipidemia  Continue atorvastatin 10 mg nightly.  Can pause prior to surgery    Gastroesophageal reflux disease  History of acid reflux, generally well controlled.  Periodically using pantoprazole only as needed    Osteopenia  Continue calcium and vitamin D.  Continue mild weight bearing exercises - walking, climbing stairs.    Cataract  History of cataracts, history of cataract surgery bilateral eyes.  Follows with ophthalmology.  Doing well at this time.     Glaucoma of both eyes  History of glaucoma, on Travatan ophthalmic solution.  Advise close follow up with ophthalmology     Primary osteoarthritis involving multiple joints  History of osteoarthritis of her knees.   Suspect arthritis of the hands and feet as well.   Overall walking ok - climbs stairs for exercise everyday  Using Voltaren gel.  History of CKD.  Avoid oral NSAIDs.  Monitor    Acute cystitis without hematuria  Urine culture  requested by urology growing non hemolytic strep.  Treated with cephalexin.       Pre Operative Clearance Plan:  Cardiovascular risk by type of surgery: Intermediate risk   ASA classification of physical status of patient: ASA 3 - Severe disease process which limits activity but is not incapacitating  Revised Cardiac Risk Index: Class II Risk - 6.0% 30-day risk of death, MI, or cardiac arrest  NAVEEN Screenin-2 Low risk of NAVEEN   Labs/testing: CBC, BMP, PT/INR, PTT, urine culture, EKG (completed 24)  Medically optimized for surgery: Yes    Will fax note to presurgical center and requesting provider   Dr Torres   Fax [phone]    William Panchal MD  Family Medicine

## 2024-06-10 NOTE — PATIENT INSTRUCTIONS
PATIENT INSTRUCTIONS    Thank you for seeing me today, it was a pleasure taking care of you.  Please check out at the  and schedule a follow up appointment.  Return in about 3 months (around 9/10/2024) for follow up.  Please remember that the anthony period for all appointments is 5 minutes. This is to help maximize the amount of time that we can spend together at our visits.    Night prior to the surgery, pause all meds except metoprolol  Hold aspirin 7 days prior to surgery   Can ask surgery team to see if they want another urine culture now that you are done being treated for infection    Dr. Abdulkadir Jamison

## 2024-06-10 NOTE — ASSESSMENT & PLAN NOTE
Following with cardiology.  Continue lisinopril 20 mg daily   Continue metoprolol tartrate 25 mg daily.  Can continue aspirin 81 mg daily.   Has isosorbide as needed.  Can pause lisinopril prior to surgery; should continue metoprolol.   Pause aspirin 81 mg daily 1 week prior to surgery

## 2024-06-10 NOTE — ASSESSMENT & PLAN NOTE
Diabetes overall controlled.  Continue metformin at 1000 mg twice daily  Need to obtain diabetic eye report  Follow-up in 3 months  Can pause metformin night prior to surgery

## 2024-06-10 NOTE — ASSESSMENT & PLAN NOTE
Blood pressure controlled.  Continue lisinopril 20 mg daily.   Continue metoprolol tartrate 25 mg daily.  Can pause lisinopril prior to surgery; should continue metoprolol.

## 2024-06-17 ENCOUNTER — LAB ENCOUNTER (OUTPATIENT)
Dept: LAB | Age: 79
End: 2024-06-17
Attending: UROLOGY

## 2024-06-17 DIAGNOSIS — Z01.818 PREOP TESTING: ICD-10-CM

## 2024-06-17 LAB
ANTIBODY SCREEN: NEGATIVE
RH BLOOD TYPE: POSITIVE

## 2024-06-17 PROCEDURE — 86850 RBC ANTIBODY SCREEN: CPT

## 2024-06-17 PROCEDURE — 86900 BLOOD TYPING SEROLOGIC ABO: CPT

## 2024-06-17 PROCEDURE — 86901 BLOOD TYPING SEROLOGIC RH(D): CPT

## 2024-06-17 PROCEDURE — 36415 COLL VENOUS BLD VENIPUNCTURE: CPT

## 2024-06-23 RX ORDER — CEFAZOLIN SODIUM/WATER 2 G/20 ML
2 SYRINGE (ML) INTRAVENOUS
Status: DISCONTINUED | OUTPATIENT
Start: 2024-06-24 | End: 2024-06-23

## 2024-06-23 RX ORDER — HEPARIN SODIUM 5000 [USP'U]/ML
5000 INJECTION, SOLUTION INTRAVENOUS; SUBCUTANEOUS ONCE
Status: COMPLETED | OUTPATIENT
Start: 2024-06-23 | End: 2024-06-24

## 2024-06-24 ENCOUNTER — ANESTHESIA EVENT (OUTPATIENT)
Dept: SURGERY | Facility: HOSPITAL | Age: 79
DRG: 657 | End: 2024-06-24
Payer: MEDICARE

## 2024-06-24 ENCOUNTER — ANESTHESIA (OUTPATIENT)
Dept: SURGERY | Facility: HOSPITAL | Age: 79
DRG: 657 | End: 2024-06-24
Payer: MEDICARE

## 2024-06-24 ENCOUNTER — HOSPITAL ENCOUNTER (INPATIENT)
Facility: HOSPITAL | Age: 79
LOS: 4 days | Discharge: HOME HEALTH | DRG: 657 | End: 2024-06-28
Attending: UROLOGY | Admitting: HOSPITALIST
Payer: MEDICARE

## 2024-06-24 DIAGNOSIS — C64.1 UROTHELIAL CARCINOMA OF KIDNEY, RIGHT (HCC): ICD-10-CM

## 2024-06-24 DIAGNOSIS — Z01.818 PREOP TESTING: Primary | ICD-10-CM

## 2024-06-24 DIAGNOSIS — D50.8 OTHER IRON DEFICIENCY ANEMIA: ICD-10-CM

## 2024-06-24 DIAGNOSIS — N18.30 STAGE 3 CHRONIC KIDNEY DISEASE, UNSPECIFIED WHETHER STAGE 3A OR 3B CKD (HCC): ICD-10-CM

## 2024-06-24 LAB
ANION GAP SERPL CALC-SCNC: 8 MMOL/L (ref 0–18)
BUN BLD-MCNC: 23 MG/DL (ref 9–23)
BUN/CREAT SERPL: 20.2 (ref 10–20)
CALCIUM BLD-MCNC: 7.8 MG/DL (ref 8.7–10.4)
CHLORIDE SERPL-SCNC: 111 MMOL/L (ref 98–112)
CO2 SERPL-SCNC: 22 MMOL/L (ref 21–32)
CREAT BLD-MCNC: 1.14 MG/DL
DEPRECATED RDW RBC AUTO: 43.5 FL (ref 35.1–46.3)
DEPRECATED RDW RBC AUTO: 46 FL (ref 35.1–46.3)
EGFRCR SERPLBLD CKD-EPI 2021: 50 ML/MIN/1.73M2 (ref 60–?)
ERYTHROCYTE [DISTWIDTH] IN BLOOD BY AUTOMATED COUNT: 13.6 % (ref 11–15)
ERYTHROCYTE [DISTWIDTH] IN BLOOD BY AUTOMATED COUNT: 13.7 % (ref 11–15)
EST. AVERAGE GLUCOSE BLD GHB EST-MCNC: 137 MG/DL (ref 68–126)
GLUCOSE BLD-MCNC: 188 MG/DL (ref 70–99)
GLUCOSE BLDC GLUCOMTR-MCNC: 102 MG/DL (ref 70–99)
GLUCOSE BLDC GLUCOMTR-MCNC: 185 MG/DL (ref 70–99)
GLUCOSE BLDC GLUCOMTR-MCNC: 198 MG/DL (ref 70–99)
GLUCOSE BLDC GLUCOMTR-MCNC: 227 MG/DL (ref 70–99)
HBA1C MFR BLD: 6.4 % (ref ?–5.7)
HCT VFR BLD AUTO: 26.3 %
HCT VFR BLD AUTO: 27.3 %
HGB BLD-MCNC: 8.2 G/DL
HGB BLD-MCNC: 8.3 G/DL
MCH RBC QN AUTO: 27.7 PG (ref 26–34)
MCH RBC QN AUTO: 27.8 PG (ref 26–34)
MCHC RBC AUTO-ENTMCNC: 30 G/DL (ref 31–37)
MCHC RBC AUTO-ENTMCNC: 31.6 G/DL (ref 31–37)
MCV RBC AUTO: 88 FL
MCV RBC AUTO: 92.2 FL
OSMOLALITY SERPL CALC.SUM OF ELEC: 301 MOSM/KG (ref 275–295)
PLATELET # BLD AUTO: 274 10(3)UL (ref 150–450)
PLATELET # BLD AUTO: 275 10(3)UL (ref 150–450)
POTASSIUM SERPL-SCNC: 5.1 MMOL/L (ref 3.5–5.1)
RBC # BLD AUTO: 2.96 X10(6)UL
RBC # BLD AUTO: 2.99 X10(6)UL
SODIUM SERPL-SCNC: 141 MMOL/L (ref 136–145)
WBC # BLD AUTO: 11.4 X10(3) UL (ref 4–11)
WBC # BLD AUTO: 13.1 X10(3) UL (ref 4–11)

## 2024-06-24 PROCEDURE — 8E0W4CZ ROBOTIC ASSISTED PROCEDURE OF TRUNK REGION, PERCUTANEOUS ENDOSCOPIC APPROACH: ICD-10-PCS | Performed by: UROLOGY

## 2024-06-24 PROCEDURE — 0TT64ZZ RESECTION OF RIGHT URETER, PERCUTANEOUS ENDOSCOPIC APPROACH: ICD-10-PCS | Performed by: UROLOGY

## 2024-06-24 PROCEDURE — 0TT04ZZ RESECTION OF RIGHT KIDNEY, PERCUTANEOUS ENDOSCOPIC APPROACH: ICD-10-PCS | Performed by: UROLOGY

## 2024-06-24 PROCEDURE — P9045 ALBUMIN (HUMAN), 5%, 250 ML: HCPCS | Performed by: ANESTHESIOLOGY

## 2024-06-24 PROCEDURE — 99223 1ST HOSP IP/OBS HIGH 75: CPT | Performed by: HOSPITALIST

## 2024-06-24 RX ORDER — HEPARIN SODIUM 5000 [USP'U]/ML
5000 INJECTION, SOLUTION INTRAVENOUS; SUBCUTANEOUS EVERY 8 HOURS SCHEDULED
Status: DISCONTINUED | OUTPATIENT
Start: 2024-06-25 | End: 2024-06-26

## 2024-06-24 RX ORDER — ONDANSETRON 2 MG/ML
4 INJECTION INTRAMUSCULAR; INTRAVENOUS EVERY 6 HOURS PRN
Status: DISCONTINUED | OUTPATIENT
Start: 2024-06-24 | End: 2024-06-24 | Stop reason: HOSPADM

## 2024-06-24 RX ORDER — ACETAMINOPHEN 500 MG
1000 TABLET ORAL ONCE
Status: COMPLETED | OUTPATIENT
Start: 2024-06-24 | End: 2024-06-24

## 2024-06-24 RX ORDER — METOCLOPRAMIDE HYDROCHLORIDE 5 MG/ML
10 INJECTION INTRAMUSCULAR; INTRAVENOUS EVERY 8 HOURS PRN
Status: DISCONTINUED | OUTPATIENT
Start: 2024-06-24 | End: 2024-06-24 | Stop reason: HOSPADM

## 2024-06-24 RX ORDER — MORPHINE SULFATE 4 MG/ML
2 INJECTION, SOLUTION INTRAMUSCULAR; INTRAVENOUS EVERY 10 MIN PRN
Status: DISCONTINUED | OUTPATIENT
Start: 2024-06-24 | End: 2024-06-24 | Stop reason: HOSPADM

## 2024-06-24 RX ORDER — LABETALOL HYDROCHLORIDE 5 MG/ML
5 INJECTION, SOLUTION INTRAVENOUS EVERY 5 MIN PRN
Status: DISCONTINUED | OUTPATIENT
Start: 2024-06-24 | End: 2024-06-24 | Stop reason: HOSPADM

## 2024-06-24 RX ORDER — HYDROMORPHONE HYDROCHLORIDE 1 MG/ML
0.6 INJECTION, SOLUTION INTRAMUSCULAR; INTRAVENOUS; SUBCUTANEOUS EVERY 5 MIN PRN
Status: DISCONTINUED | OUTPATIENT
Start: 2024-06-24 | End: 2024-06-24 | Stop reason: HOSPADM

## 2024-06-24 RX ORDER — ROCURONIUM BROMIDE 10 MG/ML
INJECTION, SOLUTION INTRAVENOUS AS NEEDED
Status: DISCONTINUED | OUTPATIENT
Start: 2024-06-24 | End: 2024-06-24 | Stop reason: SURG

## 2024-06-24 RX ORDER — POLYETHYLENE GLYCOL 3350 17 G/17G
17 POWDER, FOR SOLUTION ORAL DAILY PRN
Status: DISCONTINUED | OUTPATIENT
Start: 2024-06-24 | End: 2024-06-28

## 2024-06-24 RX ORDER — HYDRALAZINE HYDROCHLORIDE 20 MG/ML
5 INJECTION INTRAMUSCULAR; INTRAVENOUS EVERY 10 MIN PRN
Status: DISCONTINUED | OUTPATIENT
Start: 2024-06-24 | End: 2024-06-24 | Stop reason: HOSPADM

## 2024-06-24 RX ORDER — ISOSORBIDE MONONITRATE 30 MG/1
30 TABLET, EXTENDED RELEASE ORAL DAILY
Status: DISCONTINUED | OUTPATIENT
Start: 2024-06-25 | End: 2024-06-28

## 2024-06-24 RX ORDER — ATORVASTATIN CALCIUM 10 MG/1
10 TABLET, FILM COATED ORAL NIGHTLY
Status: DISCONTINUED | OUTPATIENT
Start: 2024-06-24 | End: 2024-06-28

## 2024-06-24 RX ORDER — ENEMA 19; 7 G/133ML; G/133ML
1 ENEMA RECTAL ONCE AS NEEDED
Status: DISCONTINUED | OUTPATIENT
Start: 2024-06-24 | End: 2024-06-28

## 2024-06-24 RX ORDER — NICOTINE POLACRILEX 4 MG
30 LOZENGE BUCCAL
Status: DISCONTINUED | OUTPATIENT
Start: 2024-06-24 | End: 2024-06-28

## 2024-06-24 RX ORDER — BISACODYL 10 MG
10 SUPPOSITORY, RECTAL RECTAL
Status: DISCONTINUED | OUTPATIENT
Start: 2024-06-24 | End: 2024-06-28

## 2024-06-24 RX ORDER — DEXTROSE MONOHYDRATE 25 G/50ML
50 INJECTION, SOLUTION INTRAVENOUS
Status: DISCONTINUED | OUTPATIENT
Start: 2024-06-24 | End: 2024-06-24 | Stop reason: HOSPADM

## 2024-06-24 RX ORDER — ONDANSETRON 2 MG/ML
4 INJECTION INTRAMUSCULAR; INTRAVENOUS EVERY 6 HOURS PRN
Status: DISCONTINUED | OUTPATIENT
Start: 2024-06-24 | End: 2024-06-28

## 2024-06-24 RX ORDER — HYDROMORPHONE HYDROCHLORIDE 1 MG/ML
0.2 INJECTION, SOLUTION INTRAMUSCULAR; INTRAVENOUS; SUBCUTANEOUS EVERY 2 HOUR PRN
Status: DISCONTINUED | OUTPATIENT
Start: 2024-06-24 | End: 2024-06-28

## 2024-06-24 RX ORDER — HYDROMORPHONE HYDROCHLORIDE 1 MG/ML
0.4 INJECTION, SOLUTION INTRAMUSCULAR; INTRAVENOUS; SUBCUTANEOUS EVERY 5 MIN PRN
Status: DISCONTINUED | OUTPATIENT
Start: 2024-06-24 | End: 2024-06-24 | Stop reason: HOSPADM

## 2024-06-24 RX ORDER — FAMOTIDINE 20 MG/1
20 TABLET, FILM COATED ORAL 2 TIMES DAILY
Status: DISCONTINUED | OUTPATIENT
Start: 2024-06-24 | End: 2024-06-27

## 2024-06-24 RX ORDER — HYDROMORPHONE HYDROCHLORIDE 1 MG/ML
0.2 INJECTION, SOLUTION INTRAMUSCULAR; INTRAVENOUS; SUBCUTANEOUS EVERY 5 MIN PRN
Status: DISCONTINUED | OUTPATIENT
Start: 2024-06-24 | End: 2024-06-24 | Stop reason: HOSPADM

## 2024-06-24 RX ORDER — LISINOPRIL 20 MG/1
20 TABLET ORAL DAILY
Status: CANCELLED | OUTPATIENT
Start: 2024-06-25

## 2024-06-24 RX ORDER — ONDANSETRON 2 MG/ML
INJECTION INTRAMUSCULAR; INTRAVENOUS AS NEEDED
Status: DISCONTINUED | OUTPATIENT
Start: 2024-06-24 | End: 2024-06-24 | Stop reason: SURG

## 2024-06-24 RX ORDER — NICOTINE POLACRILEX 4 MG
15 LOZENGE BUCCAL
Status: DISCONTINUED | OUTPATIENT
Start: 2024-06-24 | End: 2024-06-24 | Stop reason: HOSPADM

## 2024-06-24 RX ORDER — EPHEDRINE SULFATE 50 MG/ML
INJECTION, SOLUTION INTRAVENOUS AS NEEDED
Status: DISCONTINUED | OUTPATIENT
Start: 2024-06-24 | End: 2024-06-24 | Stop reason: SURG

## 2024-06-24 RX ORDER — DEXAMETHASONE SODIUM PHOSPHATE 4 MG/ML
VIAL (ML) INJECTION AS NEEDED
Status: DISCONTINUED | OUTPATIENT
Start: 2024-06-24 | End: 2024-06-24 | Stop reason: SURG

## 2024-06-24 RX ORDER — NICOTINE POLACRILEX 4 MG
30 LOZENGE BUCCAL
Status: DISCONTINUED | OUTPATIENT
Start: 2024-06-24 | End: 2024-06-24 | Stop reason: HOSPADM

## 2024-06-24 RX ORDER — NALOXONE HYDROCHLORIDE 0.4 MG/ML
0.08 INJECTION, SOLUTION INTRAMUSCULAR; INTRAVENOUS; SUBCUTANEOUS AS NEEDED
Status: DISCONTINUED | OUTPATIENT
Start: 2024-06-24 | End: 2024-06-24 | Stop reason: HOSPADM

## 2024-06-24 RX ORDER — PANTOPRAZOLE SODIUM 40 MG/1
40 TABLET, DELAYED RELEASE ORAL
Status: DISCONTINUED | OUTPATIENT
Start: 2024-06-25 | End: 2024-06-28

## 2024-06-24 RX ORDER — SENNOSIDES 8.6 MG
17.2 TABLET ORAL NIGHTLY
Status: DISCONTINUED | OUTPATIENT
Start: 2024-06-24 | End: 2024-06-28

## 2024-06-24 RX ORDER — SODIUM CHLORIDE, SODIUM LACTATE, POTASSIUM CHLORIDE, CALCIUM CHLORIDE 600; 310; 30; 20 MG/100ML; MG/100ML; MG/100ML; MG/100ML
INJECTION, SOLUTION INTRAVENOUS CONTINUOUS
Status: DISCONTINUED | OUTPATIENT
Start: 2024-06-24 | End: 2024-06-24

## 2024-06-24 RX ORDER — SODIUM CHLORIDE 9 MG/ML
INJECTION, SOLUTION INTRAVENOUS CONTINUOUS
Status: DISCONTINUED | OUTPATIENT
Start: 2024-06-24 | End: 2024-06-27

## 2024-06-24 RX ORDER — OXYCODONE HYDROCHLORIDE 5 MG/1
2.5 TABLET ORAL EVERY 4 HOURS PRN
Status: DISCONTINUED | OUTPATIENT
Start: 2024-06-24 | End: 2024-06-28

## 2024-06-24 RX ORDER — SODIUM CHLORIDE 9 MG/ML
INJECTION, SOLUTION INTRAVENOUS CONTINUOUS PRN
Status: DISCONTINUED | OUTPATIENT
Start: 2024-06-24 | End: 2024-06-24 | Stop reason: SURG

## 2024-06-24 RX ORDER — OXYCODONE HYDROCHLORIDE 5 MG/1
5 TABLET ORAL EVERY 4 HOURS PRN
Status: DISCONTINUED | OUTPATIENT
Start: 2024-06-24 | End: 2024-06-28

## 2024-06-24 RX ORDER — ACETAMINOPHEN 325 MG/1
650 TABLET ORAL
Status: DISCONTINUED | OUTPATIENT
Start: 2024-06-24 | End: 2024-06-28

## 2024-06-24 RX ORDER — DOCUSATE SODIUM 100 MG/1
100 CAPSULE, LIQUID FILLED ORAL 2 TIMES DAILY
Status: DISCONTINUED | OUTPATIENT
Start: 2024-06-24 | End: 2024-06-28

## 2024-06-24 RX ORDER — HYDROMORPHONE HYDROCHLORIDE 1 MG/ML
0.4 INJECTION, SOLUTION INTRAMUSCULAR; INTRAVENOUS; SUBCUTANEOUS EVERY 2 HOUR PRN
Status: DISCONTINUED | OUTPATIENT
Start: 2024-06-24 | End: 2024-06-28

## 2024-06-24 RX ORDER — PHENYLEPHRINE HCL 10 MG/ML
VIAL (ML) INJECTION AS NEEDED
Status: DISCONTINUED | OUTPATIENT
Start: 2024-06-24 | End: 2024-06-24 | Stop reason: SURG

## 2024-06-24 RX ORDER — LATANOPROST 50 UG/ML
1 SOLUTION/ DROPS OPHTHALMIC NIGHTLY
Status: DISCONTINUED | OUTPATIENT
Start: 2024-06-24 | End: 2024-06-28

## 2024-06-24 RX ORDER — METOCLOPRAMIDE HYDROCHLORIDE 5 MG/ML
10 INJECTION INTRAMUSCULAR; INTRAVENOUS EVERY 8 HOURS PRN
Status: DISCONTINUED | OUTPATIENT
Start: 2024-06-24 | End: 2024-06-28

## 2024-06-24 RX ORDER — MORPHINE SULFATE 10 MG/ML
6 INJECTION, SOLUTION INTRAMUSCULAR; INTRAVENOUS EVERY 10 MIN PRN
Status: DISCONTINUED | OUTPATIENT
Start: 2024-06-24 | End: 2024-06-24 | Stop reason: HOSPADM

## 2024-06-24 RX ORDER — ALBUMIN, HUMAN INJ 5% 5 %
SOLUTION INTRAVENOUS CONTINUOUS PRN
Status: DISCONTINUED | OUTPATIENT
Start: 2024-06-24 | End: 2024-06-24 | Stop reason: SURG

## 2024-06-24 RX ORDER — NICOTINE POLACRILEX 4 MG
15 LOZENGE BUCCAL
Status: DISCONTINUED | OUTPATIENT
Start: 2024-06-24 | End: 2024-06-28

## 2024-06-24 RX ORDER — BUPIVACAINE HYDROCHLORIDE 2.5 MG/ML
INJECTION, SOLUTION EPIDURAL; INFILTRATION; INTRACAUDAL AS NEEDED
Status: DISCONTINUED | OUTPATIENT
Start: 2024-06-24 | End: 2024-06-24 | Stop reason: HOSPADM

## 2024-06-24 RX ORDER — SODIUM CHLORIDE, SODIUM LACTATE, POTASSIUM CHLORIDE, CALCIUM CHLORIDE 600; 310; 30; 20 MG/100ML; MG/100ML; MG/100ML; MG/100ML
INJECTION, SOLUTION INTRAVENOUS CONTINUOUS
Status: DISCONTINUED | OUTPATIENT
Start: 2024-06-24 | End: 2024-06-24 | Stop reason: HOSPADM

## 2024-06-24 RX ORDER — FAMOTIDINE 10 MG/ML
20 INJECTION, SOLUTION INTRAVENOUS 2 TIMES DAILY
Status: DISCONTINUED | OUTPATIENT
Start: 2024-06-24 | End: 2024-06-27

## 2024-06-24 RX ORDER — LIDOCAINE HYDROCHLORIDE 10 MG/ML
INJECTION, SOLUTION EPIDURAL; INFILTRATION; INTRACAUDAL; PERINEURAL AS NEEDED
Status: DISCONTINUED | OUTPATIENT
Start: 2024-06-24 | End: 2024-06-24 | Stop reason: SURG

## 2024-06-24 RX ORDER — MORPHINE SULFATE 4 MG/ML
4 INJECTION, SOLUTION INTRAMUSCULAR; INTRAVENOUS EVERY 10 MIN PRN
Status: DISCONTINUED | OUTPATIENT
Start: 2024-06-24 | End: 2024-06-24 | Stop reason: HOSPADM

## 2024-06-24 RX ORDER — DEXTROSE MONOHYDRATE 25 G/50ML
50 INJECTION, SOLUTION INTRAVENOUS
Status: DISCONTINUED | OUTPATIENT
Start: 2024-06-24 | End: 2024-06-28

## 2024-06-24 RX ADMIN — LIDOCAINE HYDROCHLORIDE 40 MG: 10 INJECTION, SOLUTION EPIDURAL; INFILTRATION; INTRACAUDAL; PERINEURAL at 07:37:00

## 2024-06-24 RX ADMIN — SODIUM CHLORIDE, SODIUM LACTATE, POTASSIUM CHLORIDE, CALCIUM CHLORIDE: 600; 310; 30; 20 INJECTION, SOLUTION INTRAVENOUS at 11:43:00

## 2024-06-24 RX ADMIN — PHENYLEPHRINE HCL 100 MCG: 10 MG/ML VIAL (ML) INJECTION at 09:39:00

## 2024-06-24 RX ADMIN — EPHEDRINE SULFATE 10 MG: 50 INJECTION, SOLUTION INTRAVENOUS at 07:49:00

## 2024-06-24 RX ADMIN — SODIUM CHLORIDE, SODIUM LACTATE, POTASSIUM CHLORIDE, CALCIUM CHLORIDE: 600; 310; 30; 20 INJECTION, SOLUTION INTRAVENOUS at 14:25:00

## 2024-06-24 RX ADMIN — PHENYLEPHRINE HCL 100 MCG: 10 MG/ML VIAL (ML) INJECTION at 09:54:00

## 2024-06-24 RX ADMIN — ROCURONIUM BROMIDE 50 MG: 10 INJECTION, SOLUTION INTRAVENOUS at 07:38:00

## 2024-06-24 RX ADMIN — ROCURONIUM BROMIDE 10 MG: 10 INJECTION, SOLUTION INTRAVENOUS at 11:36:00

## 2024-06-24 RX ADMIN — PHENYLEPHRINE HCL 100 MCG: 10 MG/ML VIAL (ML) INJECTION at 08:05:00

## 2024-06-24 RX ADMIN — EPHEDRINE SULFATE 10 MG: 50 INJECTION, SOLUTION INTRAVENOUS at 08:24:00

## 2024-06-24 RX ADMIN — PHENYLEPHRINE HCL 100 MCG: 10 MG/ML VIAL (ML) INJECTION at 07:50:00

## 2024-06-24 RX ADMIN — ONDANSETRON 4 MG: 2 INJECTION INTRAMUSCULAR; INTRAVENOUS at 14:36:00

## 2024-06-24 RX ADMIN — SODIUM CHLORIDE: 9 INJECTION, SOLUTION INTRAVENOUS at 10:11:00

## 2024-06-24 RX ADMIN — PHENYLEPHRINE HCL 100 MCG: 10 MG/ML VIAL (ML) INJECTION at 09:22:00

## 2024-06-24 RX ADMIN — SODIUM CHLORIDE: 9 INJECTION, SOLUTION INTRAVENOUS at 07:45:00

## 2024-06-24 RX ADMIN — ROCURONIUM BROMIDE 10 MG: 10 INJECTION, SOLUTION INTRAVENOUS at 10:21:00

## 2024-06-24 RX ADMIN — ALBUMIN, HUMAN INJ 5%: 5 SOLUTION INTRAVENOUS at 13:12:00

## 2024-06-24 RX ADMIN — SODIUM CHLORIDE: 9 INJECTION, SOLUTION INTRAVENOUS at 11:43:00

## 2024-06-24 RX ADMIN — ALBUMIN, HUMAN INJ 5%: 5 SOLUTION INTRAVENOUS at 13:27:00

## 2024-06-24 RX ADMIN — SODIUM CHLORIDE, SODIUM LACTATE, POTASSIUM CHLORIDE, CALCIUM CHLORIDE: 600; 310; 30; 20 INJECTION, SOLUTION INTRAVENOUS at 08:58:00

## 2024-06-24 RX ADMIN — SODIUM CHLORIDE: 9 INJECTION, SOLUTION INTRAVENOUS at 13:40:00

## 2024-06-24 RX ADMIN — DEXAMETHASONE SODIUM PHOSPHATE 4 MG: 4 MG/ML VIAL (ML) INJECTION at 08:00:00

## 2024-06-24 RX ADMIN — ROCURONIUM BROMIDE 10 MG: 10 INJECTION, SOLUTION INTRAVENOUS at 09:12:00

## 2024-06-24 RX ADMIN — PHENYLEPHRINE HCL 100 MCG: 10 MG/ML VIAL (ML) INJECTION at 08:20:00

## 2024-06-24 NOTE — ANESTHESIA PROCEDURE NOTES
Airway  Date/Time: 6/24/2024 7:40 AM  Urgency: elective    Airway not difficult    General Information and Staff    Patient location during procedure: OR  Anesthesiologist: Tino Mixon MD  Performed: anesthesiologist   Performed by: Tino Mixon MD  Authorized by: Tino Mixon MD      Indications and Patient Condition  Indications for airway management: anesthesia  Sedation level: deep  Preoxygenated: yes  Patient position: sniffing  MILS maintained throughout  Mask difficulty assessment: 1 - vent by mask    Final Airway Details  Final airway type: endotracheal airway      Successful airway: ETT  Cuffed: yes   Successful intubation technique: Video laryngoscopy  Facilitating devices/methods: intubating stylet  Endotracheal tube insertion site: oral  Blade type: Biocartis video laryngoscope.  Blade size: #3  ETT size (mm): 7.0    Cormack-Lehane Classification: grade I - full view of glottis  Placement verified by: capnometry   Measured from: lips  ETT to lips (cm): 20  Number of attempts at approach: 1

## 2024-06-24 NOTE — BRIEF OP NOTE
Pre-Operative Diagnosis: Urothelial carcinoma     Post-Operative Diagnosis: Urothelial carcinoma      Procedure Performed:   Robotic assisted laparoscopic right nephroureterectomy    Surgeons and Role:     * Adriane Torres MD - Primary     * Marv Chairez DO     Surgical Findings: Cirrhotic liver.  Some ureteral adhesions in the pelvis from prior gynecologic surgery.  Omental adhesions to anterior abdominal wall.     Specimen: Right kidney and ureter     Estimated Blood Loss: 200 mL        Adriane Torres MD  6/24/2024  4:09 PM
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
## 2024-06-25 LAB
ANION GAP SERPL CALC-SCNC: 7 MMOL/L (ref 0–18)
BASOPHILS # BLD AUTO: 0.03 X10(3) UL (ref 0–0.2)
BASOPHILS NFR BLD AUTO: 0.3 %
BUN BLD-MCNC: 24 MG/DL (ref 9–23)
BUN/CREAT SERPL: 16.2 (ref 10–20)
CALCIUM BLD-MCNC: 7.3 MG/DL (ref 8.7–10.4)
CHLORIDE SERPL-SCNC: 113 MMOL/L (ref 98–112)
CO2 SERPL-SCNC: 22 MMOL/L (ref 21–32)
CREAT BLD-MCNC: 1.48 MG/DL
CREAT FLD-MCNC: 1.31 MG/DL
DEPRECATED RDW RBC AUTO: 45.7 FL (ref 35.1–46.3)
EGFRCR SERPLBLD CKD-EPI 2021: 36 ML/MIN/1.73M2 (ref 60–?)
EOSINOPHIL # BLD AUTO: 0.21 X10(3) UL (ref 0–0.7)
EOSINOPHIL NFR BLD AUTO: 2.2 %
ERYTHROCYTE [DISTWIDTH] IN BLOOD BY AUTOMATED COUNT: 13.9 % (ref 11–15)
GLUCOSE BLD-MCNC: 136 MG/DL (ref 70–99)
GLUCOSE BLDC GLUCOMTR-MCNC: 132 MG/DL (ref 70–99)
GLUCOSE BLDC GLUCOMTR-MCNC: 137 MG/DL (ref 70–99)
GLUCOSE BLDC GLUCOMTR-MCNC: 145 MG/DL (ref 70–99)
GLUCOSE BLDC GLUCOMTR-MCNC: 161 MG/DL (ref 70–99)
HCT VFR BLD AUTO: 21.5 %
HCT VFR BLD AUTO: 32.3 %
HGB BLD-MCNC: 10.6 G/DL
HGB BLD-MCNC: 6.6 G/DL
IMM GRANULOCYTES # BLD AUTO: 0.02 X10(3) UL (ref 0–1)
IMM GRANULOCYTES NFR BLD: 0.2 %
LYMPHOCYTES # BLD AUTO: 1.5 X10(3) UL (ref 1–4)
LYMPHOCYTES NFR BLD AUTO: 16 %
MCH RBC QN AUTO: 27.6 PG (ref 26–34)
MCHC RBC AUTO-ENTMCNC: 30.7 G/DL (ref 31–37)
MCV RBC AUTO: 90 FL
MONOCYTES # BLD AUTO: 0.76 X10(3) UL (ref 0.1–1)
MONOCYTES NFR BLD AUTO: 8.1 %
NEUTROPHILS # BLD AUTO: 6.86 X10 (3) UL (ref 1.5–7.7)
NEUTROPHILS # BLD AUTO: 6.86 X10(3) UL (ref 1.5–7.7)
NEUTROPHILS NFR BLD AUTO: 73.2 %
OSMOLALITY SERPL CALC.SUM OF ELEC: 300 MOSM/KG (ref 275–295)
PLATELET # BLD AUTO: 242 10(3)UL (ref 150–450)
POTASSIUM SERPL-SCNC: 5 MMOL/L (ref 3.5–5.1)
RBC # BLD AUTO: 2.39 X10(6)UL
SODIUM SERPL-SCNC: 142 MMOL/L (ref 136–145)
WBC # BLD AUTO: 9.4 X10(3) UL (ref 4–11)

## 2024-06-25 PROCEDURE — 99233 SBSQ HOSP IP/OBS HIGH 50: CPT | Performed by: HOSPITALIST

## 2024-06-25 PROCEDURE — 30233N1 TRANSFUSION OF NONAUTOLOGOUS RED BLOOD CELLS INTO PERIPHERAL VEIN, PERCUTANEOUS APPROACH: ICD-10-PCS | Performed by: INTERNAL MEDICINE

## 2024-06-25 RX ORDER — SODIUM CHLORIDE 9 MG/ML
INJECTION, SOLUTION INTRAVENOUS ONCE
Status: COMPLETED | OUTPATIENT
Start: 2024-06-25 | End: 2024-06-25
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
## 2024-06-25 NOTE — PROGRESS NOTES
[facility]  part of [facility]    Progress Note    Toya Mcdaniel Patient Status:  Inpatient    1945 MRN [id number]   Location [facility] 4W/SW/SE Attending Jorge Woodard MD   Hosp Day # 1 PCP William Panchal MD       Subjective:   Toya Mcdaniel is a(n) 78 year old female was seen and examined  Felt dizzy earlier, currently in bed feeling a little better  Having minimal post op pain  No cp, sob f,c,v, abd pain or HA   No blurry vision    Objective:   Blood pressure (!) 85/37, pulse 98, temperature 98.2 °F (36.8 °C), temperature source Oral, resp. rate 18, height 4' 9\" (1.448 m), weight 137 lb (62.1 kg), SpO2 100%, not currently breastfeeding.    GENERAL:  Alert and oriented to time, place, and person.  No acute distress.  HEENT:  Atraumatic.  Oropharynx clear.  Moist mucous membranes.  Ears, nose normal.  Eyes:  Anicteric sclerae.  NECK:  Supple.  No lymphadenopathy.  Trachea midline.  Full range of motion.  LUNGS:  Clear to auscultation bilaterally.  Normal respiratory effort.  HEART:  Regular rate, rhythm.  S1 and S2 auscultated.  No murmur.  ABDOMEN:  Soft, nondistended.  Laparoscopic incisions right lower quadrant.  MICHAEL drain.  Hypoactive bowel sounds.  No tenderness.  EXTREMITIES:  No peripheral edema, clubbing, or cyanosis.  NEUROLOGIC:  Motor and sensory intact.     Current Inpatient Medications:     Current Facility-Administered Medications:     sodium chloride 0.9% infusion, , Intravenous, Once    metoprolol tartrate (Lopressor) tab 25 mg, 25 mg, Oral, BID    atorvastatin (Lipitor) tab 10 mg, 10 mg, Oral, Nightly    isosorbide mononitrate ER (Imdur) 24 hr tab 30 mg, 30 mg, Oral, Daily    latanoprost (Xalatan) 0.005 % ophthalmic solution 1 drop, 1 drop, Both Eyes, Nightly    pantoprazole (Protonix) DR tab 40 mg, 40 mg, Oral, Before breakfast    glucose (Dex4) 15 GM/59ML oral liquid 15 g, 15 g, Oral, Q15 Min PRN **OR** glucose (Glutose) 40% oral gel 15 g, 15 g,  Oral, Q15 Min PRN **OR** glucose-vitamin C (Dex-4) chewable tab 4 tablet, 4 tablet, Oral, Q15 Min PRN **OR** dextrose 50% injection 50 mL, 50 mL, Intravenous, Q15 Min PRN **OR** glucose (Dex4) 15 GM/59ML oral liquid 30 g, 30 g, Oral, Q15 Min PRN **OR** glucose (Glutose) 40% oral gel 30 g, 30 g, Oral, Q15 Min PRN **OR** glucose-vitamin C (Dex-4) chewable tab 8 tablet, 8 tablet, Oral, Q15 Min PRN    insulin aspart (NovoLOG) 100 Units/mL FlexPen 1-7 Units, 1-7 Units, Subcutaneous, TID CC    sennosides (Senokot) tab 17.2 mg, 17.2 mg, Oral, Nightly    docusate sodium (Colace) cap 100 mg, 100 mg, Oral, BID    polyethylene glycol (PEG 3350) (Miralax) 17 g oral packet 17 g, 17 g, Oral, Daily PRN    magnesium hydroxide (Milk of Magnesia) 400 MG/5ML oral suspension 30 mL, 30 mL, Oral, Daily PRN    bisacodyl (Dulcolax) 10 MG rectal suppository 10 mg, 10 mg, Rectal, Daily PRN    fleet enema (Fleet) 7-19 GM/118ML rectal enema 133 mL, 1 enema, Rectal, Once PRN    ondansetron (Zofran) 4 MG/2ML injection 4 mg, 4 mg, Intravenous, Q6H PRN    metoclopramide (Reglan) 5 mg/mL injection 10 mg, 10 mg, Intravenous, Q8H PRN    sodium chloride 0.9% infusion, , Intravenous, Continuous    heparin (Porcine) 5000 UNIT/ML injection 5,000 Units, 5,000 Units, Subcutaneous, Q8H ELENI    acetaminophen (Tylenol) tab 650 mg, 650 mg, Oral, Q4H While awake    oxyCODONE immediate release tab 2.5 mg, 2.5 mg, Oral, Q4H PRN **OR** oxyCODONE immediate release tab 5 mg, 5 mg, Oral, Q4H PRN    HYDROmorphone (Dilaudid) 1 MG/ML injection 0.2 mg, 0.2 mg, Intravenous, Q2H PRN **OR** HYDROmorphone (Dilaudid) 1 MG/ML injection 0.4 mg, 0.4 mg, Intravenous, Q2H PRN    famotidine (Pepcid) tab 20 mg, 20 mg, Oral, BID **OR** famotidine (Pepcid) 20 mg/2mL injection 20 mg, 20 mg, Intravenous, BID    Assessment and Plan:   Right renal pelvis high-grade urothelial carcinoma  Status post robot-assisted laparoscopic right nephroureterectomy.    Pain control adquate  Cont Monitor  hemodynamic status.   Hold DVT prophylaxis due to anemia.    Full pathology report still pending.    Acute blood loss anemia  Due to post-operative status  Hgb this AM 6.6, will transfuse 2 U PRBC  Cont to monitor closely     Diabetes mellitus type 2.    Monitor Accu-Cheks  Cont CF insulin    Essential hypertension.    Hold meds due to hypotension     Chronic kidney disease stage 3.    Hold lisinopril and monitor kidney function  Cr up today likely due to hypotension, dehydration  Check Cr in AM     Hyperlipidemia.    Continue home medications.    Full Code  Results:     Recent Labs   Lab 06/24/24  1612 06/24/24 2013 06/25/24  0719   RBC 2.99* 2.96* 2.39*   HGB 8.3* 8.2* 6.6*   HCT 26.3* 27.3* 21.5*   MCV 88.0 92.2 90.0   MCH 27.8 27.7 27.6   MCHC 31.6 30.0* 30.7*   RDW 13.6 13.7 13.9   NEPRELIM  --   --  6.86   WBC 11.4* 13.1* 9.4   .0 275.0 242.0         Recent Labs   Lab 06/24/24  1612 06/25/24  0719   * 136*   BUN 23 24*   CREATSERUM 1.14* 1.48*   EGFRCR 50* 36*   CA 7.8* 7.3*    142   K 5.1 5.0    113*   CO2 22.0 22.0         Imaging:   No results found.          Jorge Woodard MD  6/25/2024

## 2024-06-25 NOTE — ANESTHESIA POSTPROCEDURE EVALUATION
Patient: Toya Mcdaniel    Procedure Summary       Date: 06/24/24 Room / Location: [facility] MAIN OR  / [facility] MAIN OR    Anesthesia Start: 0733 Anesthesia Stop: 1512    Procedure: Robotic assisted laparoscopic right nephroureterectomy (Right: Flank) Diagnosis: (Urothelial carcinoma)    Surgeons: Adriane Torres MD Anesthesiologist: Tino Mixon MD    Anesthesia Type: general ASA Status: 3            Anesthesia Type: general    Vitals Value Taken Time   BP 96/40 06/25/24 0403   Temp 98 °F (36.7 °C) 06/25/24 0403   Pulse 106 06/25/24 0930   Resp 16 06/25/24 0403   SpO2 95 % 06/25/24 0403   Vitals shown include unfiled device data.    [facility] AN Post Evaluation:   Patient Evaluated in floor  Patient Participation: complete - patient participated  Level of Consciousness: awake and alert  Pain Score: 2  Pain Management: adequate  Airway Patency:patent  Dental exam unchanged from preop  Yes    Nausea/Vomiting: none  Cardiovascular Status: acceptable  Respiratory Status: acceptable  Postoperative Hydration acceptable      Tino Mixon MD  6/25/2024 9:30 AM

## 2024-06-25 NOTE — PLAN OF CARE
Patient arrived to E from PACU.  Oriented to room, caregivers, ad functions of unit.  Discussed plan of care for evening, including all given medications and their indications. Pain managed with IV Dilaudid PRN and scheduled Tylenol.  Nausea managed with Zofran or Reglan as needed.  Persistent leaking from MICHAEL drain site and increased MICHAEL drain output -- Dr. Torres aware.  Comfort measures encouraged to promote restful environment.     Problem: Patient Centered Care  Goal: Patient preferences are identified and integrated in the patient's plan of care  Description: Interventions:  - What would you like us to know as we care for you? I live at home with my 2 sons.  - Provide timely, complete, and accurate information to patient/family  - Incorporate patient and family knowledge, values, beliefs, and cultural backgrounds into the planning and delivery of care  - Encourage patient/family to participate in care and decision-making at the level they choose  - Honor patient and family perspectives and choices  Outcome: Progressing     Problem: Patient/Family Goals  Goal: Patient/Family Long Term Goal  Description: Patient's Long Term Goal: Discharge home    Interventions:  - Manage pain after surgery  - Demonstrate proper care of Quiles catheter  - Regain bowel function  - See additional Care Plan goals for specific interventions  Outcome: Progressing  Goal: Patient/Family Short Term Goal  Description: Patient's Short Term Goal: Manage pain after surgery    Interventions:   - Transition from IV to oral pain medication  - Take periods of rest after periods of activity  - Splint abdomen with pillow before position changes  - See additional Care Plan goals for specific interventions  Outcome: Progressing     Problem: Diabetes/Glucose Control  Goal: Glucose maintained within prescribed range  Description: INTERVENTIONS:  - Monitor Blood Glucose as ordered  - Assess for signs and symptoms of hyperglycemia and hypoglycemia  -  Administer ordered medications to maintain glucose within target range  - Assess barriers to adequate nutritional intake and initiate nutrition consult as needed  - Instruct patient on self management of diabetes  Outcome: Progressing     Problem: PAIN - ADULT  Goal: Verbalizes/displays adequate comfort level or patient's stated pain goal  Description: INTERVENTIONS:  - Encourage pt to monitor pain and request assistance  - Assess pain using appropriate pain scale  - Administer analgesics based on type and severity of pain and evaluate response  - Implement non-pharmacological measures as appropriate and evaluate response  - Consider cultural and social influences on pain and pain management  - Manage/alleviate anxiety  - Utilize distraction and/or relaxation techniques  - Monitor for opioid side effects  - Notify MD/LIP if interventions unsuccessful or patient reports new pain  - Anticipate increased pain with activity and pre-medicate as appropriate  Outcome: Progressing     Problem: RISK FOR INFECTION - ADULT  Goal: Absence of fever/infection during anticipated neutropenic period  Description: INTERVENTIONS  - Monitor WBC  - Administer growth factors as ordered  - Implement neutropenic guidelines  Outcome: Progressing     Problem: SAFETY ADULT - FALL  Goal: Free from fall injury  Description: INTERVENTIONS:  - Assess pt frequently for physical needs  - Identify cognitive and physical deficits and behaviors that affect risk of falls.  - [place] fall precautions as indicated by assessment.  - Educate pt/family on patient safety including physical limitations  - Instruct pt to call for assistance with activity based on assessment  - Modify environment to reduce risk of injury  - Provide assistive devices as appropriate  - Consider OT/PT consult to assist with strengthening/mobility  - Encourage toileting schedule  Outcome: Progressing     Problem: DISCHARGE PLANNING  Goal: Discharge to home or other facility with  appropriate resources  Description: INTERVENTIONS:  - Identify barriers to discharge w/pt and caregiver  - Include patient/family/discharge partner in discharge planning  - Arrange for needed discharge resources and transportation as appropriate  - Identify discharge learning needs (meds, wound care, etc)  - Arrange for interpreters to assist at discharge as needed  - Consider post-discharge preferences of patient/family/discharge partner  - Complete POLST form as appropriate  - Assess patient's ability to be responsible for managing their own health  - Refer to Case Management Department for coordinating discharge planning if the patient needs post-hospital services based on physician/LIP order or complex needs related to functional status, cognitive ability or social support system  Outcome: Progressing     Problem: GENITOURINARY - ADULT  Goal: Absence of urinary retention  Description: INTERVENTIONS:  - Assess patient’s ability to void and empty bladder  - Monitor intake/output and perform bladder scan as needed  - Follow urinary retention protocol/standard of care  - Consider collaborating with pharmacy to review patient's medication profile  - Implement strategies to promote bladder emptying  Outcome: Progressing     Problem: GASTROINTESTINAL - ADULT  Goal: Minimal or absence of nausea and vomiting  Description: INTERVENTIONS:  - Maintain adequate hydration with IV or PO as ordered and tolerated  - Nasogastric tube to low intermittent suction as ordered  - Evaluate effectiveness of ordered antiemetic medications  - Provide nonpharmacologic comfort measures as appropriate  - Advance diet as tolerated, if ordered  - Obtain nutritional consult as needed  - Evaluate fluid balance  Outcome: Progressing  Goal: Maintains or returns to baseline bowel function  Description: INTERVENTIONS:  - Assess bowel function  - Maintain adequate hydration with IV or PO as ordered and tolerated  - Evaluate effectiveness of GI  medications  - Encourage mobilization and activity  - Obtain nutritional consult as needed  - Establish a toileting routine/schedule  - Consider collaborating with pharmacy to review patient's medication profile  Outcome: Progressing     All efforts maintained to promote infection prevention during my assessment and care for this patient.  Stethoscope cleansed and hand hygiene strictly maintained.

## 2024-06-25 NOTE — PROGRESS NOTES
Brief  Note    Patient reassessed this afternoon. BP has fluctuated but currently better (104/49). New right upper quadrant pain. Not present this morning. Abdomen remains soft, increased tenderness right upper quadrant. No ecchymosis. Incisions intact. Fluid in MICHAEL drain appears lighter than this morning. We will recheck an HH after her second unit of blood. Low threshold for abdominal imaging if her Hgb does not respond or should she continue to be hypotensive.     Marv Chairez, DO  [facility]  [address]  [address]  [place] [address]  Office [phone]

## 2024-06-25 NOTE — PHYSICAL THERAPY NOTE
PHYSICAL THERAPY EVALUATION - INPATIENT     Room Number: 440/440-A  Evaluation Date: 6/25/2024  Type of Evaluation: Initial   Physician Order: PT Eval and Treat    Presenting Problem: XI ROBOT-ASSISTED LAPAROSCOPIC NEPHROURETERECTOMY (6/24)     Reason for Therapy: Mobility Dysfunction and Discharge Planning    PHYSICAL THERAPY ASSESSMENT   Patient is a 78 year old female admitted 6/24/2024 for robot assisted laparoscopic nephroureterectomy.  Prior to admission, patient's baseline is independent, lives alone but family will be providing post op support.  Patient is currently functioning below baseline with bed mobility, transfers, and gait.  Patient is requiring contact guard assist as a result of the following impairments: decreased functional strength, decreased endurance/aerobic capacity, pain, and medical status.  Physical Therapy will continue to follow for duration of hospitalization.    Patient will benefit from continued skilled PT Services at discharge to promote functional independence and safety with additional support and return home with home health PT vs no therapy needs pending progress.     PLAN  PT Treatment Plan: Bed mobility;Body mechanics;Endurance;Energy conservation;Patient education;Family education;Gait training;Range of motion;Stoop training;Transfer training;Balance training  Rehab Potential : Good  Frequency (Obs): 5x/week    PHYSICAL THERAPY MEDICAL/SOCIAL HISTORY       Problem List  Principal Problem:    Urothelial carcinoma (HCC)  Active Problems:    Type 2 diabetes mellitus with both eyes affected by mild nonproliferative retinopathy without macular edema, without long-term current use of insulin (HCC)    Essential hypertension    CKD (chronic kidney disease) stage 3, GFR 30-59 ml/min (HCC)    Hyperlipidemia    Urothelial carcinoma of kidney, right (HCC)      HOME SITUATION  Home Situation  Type of Home: House  Home Layout: One level (+ b asement laundry)  Stairs to Enter : 5  Railing:  Yes  Lives With: Alone (family will stay with her)  Drives: Yes     SUBJECTIVE  \"I can walk a little bit.\"     PHYSICAL THERAPY EXAMINATION   OBJECTIVE  Precautions: Bed/chair alarm  Fall Risk: High fall risk    WEIGHT BEARING RESTRICTION  Weight Bearing Restriction: None                PAIN ASSESSMENT     Location: abd pain unrated       COGNITION  Overall Cognitive Status:  WFL - within functional limits    BALANCE  Static Sitting: Good  Dynamic Sitting: Fair +  Static Standing: Fair  Dynamic Standing: Fair -       ACTIVITY TOLERANCE           BP: 111/49  BP Location: Right arm  BP Method: Automatic  Patient Position: Sitting    O2 WALK       AM-PAC '6-Clicks' INPATIENT SHORT FORM - BASIC MOBILITY  How much difficulty does the patient currently have...  Patient Difficulty: Turning over in bed (including adjusting bedclothes, sheets and blankets)?: A Little   Patient Difficulty: Sitting down on and standing up from a chair with arms (e.g., wheelchair, bedside commode, etc.): A Little   Patient Difficulty: Moving from lying on back to sitting on the side of the bed?: A Little   How much help from another person does the patient currently need...   Help from Another: Moving to and from a bed to a chair (including a wheelchair)?: A Little   Help from Another: Need to walk in hospital room?: A Little   Help from Another: Climbing 3-5 steps with a railing?: A Little     AM-PAC Score:  Raw Score: 18   Approx Degree of Impairment: 46.58%   Standardized Score (AM-PAC Scale): 43.63   CMS Modifier (G-Code): CK    FUNCTIONAL ABILITY STATUS  Functional Mobility/Gait Assessment  Gait Assistance: Contact guard assist  Distance (ft): 25'  Assistive Device: Rolling walker  Pattern: Shuffle  Rolling: contact guard assist  Supine to Sit: contact guard assist  Sit to Supine: contact guard assist  Sit to Stand: contact guard assist    Exercise/Education Provided:  Bed mobility  Body mechanics  Functional activity tolerated  Gait  training  Transfer training    The patient's Approx Degree of Impairment: 46.58% has been calculated based on documentation in the [facility] '6 clicks' Inpatient Basic Mobility Short Form.  Research supports that patients with this level of impairment may benefit from home with home care.  Final disposition will be made by interdisciplinary medical team.    Patient End of Session: Up in chair;Needs met;Call light within reach;RN aware of session/findings;All patient questions and concerns addressed    CURRENT GOALS  Goals to be met by: 7/15  Patient Goal Patient's self-stated goal is: unstated    Goal #1 Patient is able to demonstrate supine - sit EOB @ level: supervision     Goal #1   Current Status    Goal #2 Patient is able to demonstrate transfers Sit to/from Stand at assistance level: independent with none     Goal #2  Current Status    Goal #3 Patient is able to ambulate 150 feet with assist device: none at assistance level: independent   Goal #3   Current Status    Goal #4 Patient will negotiate 6 stairs/one curb w/ assistive device and supervision   Goal #4   Current Status    Goal #5 Patient to demonstrate independence with home activity/exercise instructions provided to patient in preparation for discharge.   Goal #5   Current Status    Goal #6    Goal #6  Current Status      Patient Evaluation Complexity Level:  History Moderate - 1 or 2 personal factors and/or co-morbidities   Examination of body systems Moderate - addressing a total of 3 or more elements   Clinical Presentation  Moderate - Evolving   Clinical Decision Making  Moderate Complexity     Therapeutic Activity:  12 minutes

## 2024-06-25 NOTE — PLAN OF CARE
Patient alert, oriented, MICHAEL draining large amounts of fluid, pacheco cath in place emptied 750 of brown urine , Dr Chairez notified pacheco stopped draining at approximately 1730, pacheco flushed with 20 cc of NS not aspirated, dr Torres is also aware and coming to see pt, hgb 6.6 in am, 2 units of PRBC given HGB level ordered, awaiting result , medicated pt for pain as needed, family at bedside.  Problem: Patient Centered Care  Goal: Patient preferences are identified and integrated in the patient's plan of care  Description: Interventions:  - What would you like us to know as we care for you? I live at home with my 2 sons.  - Provide timely, complete, and accurate information to patient/family  - Incorporate patient and family knowledge, values, beliefs, and cultural backgrounds into the planning and delivery of care  - Encourage patient/family to participate in care and decision-making at the level they choose  - Honor patient and family perspectives and choices  Outcome: Not Progressing

## 2024-06-25 NOTE — PLAN OF CARE
No acute changes over night. Pt is alert and oriented on RA. Pt is primarily Maur[place] speaking. Remote tele in place. Quiles is in place. IVF and abx running as ordered Pain managed with Tylenol. Pt is on a CLD. Pt denies any nausea or vomiting. MICHAEL drain managed as ordered. Pt did walk to bathroom but complained of some dizziness when she first got up but resolved after sitting. Call light is within reach and safety measures are in place.    Problem: Diabetes/Glucose Control  Goal: Glucose maintained within prescribed range  Description: INTERVENTIONS:  - Monitor Blood Glucose as ordered  - Assess for signs and symptoms of hyperglycemia and hypoglycemia  - Administer ordered medications to maintain glucose within target range  - Assess barriers to adequate nutritional intake and initiate nutrition consult as needed  - Instruct patient on self management of diabetes  Outcome: Progressing     Problem: PAIN - ADULT  Goal: Verbalizes/displays adequate comfort level or patient's stated pain goal  Description: INTERVENTIONS:  - Encourage pt to monitor pain and request assistance  - Assess pain using appropriate pain scale  - Administer analgesics based on type and severity of pain and evaluate response  - Implement non-pharmacological measures as appropriate and evaluate response  - Consider cultural and social influences on pain and pain management  - Manage/alleviate anxiety  - Utilize distraction and/or relaxation techniques  - Monitor for opioid side effects  - Notify MD/LIP if interventions unsuccessful or patient reports new pain  - Anticipate increased pain with activity and pre-medicate as appropriate  Outcome: Progressing     Problem: RISK FOR INFECTION - ADULT  Goal: Absence of fever/infection during anticipated neutropenic period  Description: INTERVENTIONS  - Monitor WBC  - Administer growth factors as ordered  - Implement neutropenic guidelines  Outcome: Progressing     Problem: SAFETY ADULT - FALL  Goal: Free  from fall injury  Description: INTERVENTIONS:  - Assess pt frequently for physical needs  - Identify cognitive and physical deficits and behaviors that affect risk of falls.  - [place] fall precautions as indicated by assessment.  - Educate pt/family on patient safety including physical limitations  - Instruct pt to call for assistance with activity based on assessment  - Modify environment to reduce risk of injury  - Provide assistive devices as appropriate  - Consider OT/PT consult to assist with strengthening/mobility  - Encourage toileting schedule  Outcome: Progressing     Problem: DISCHARGE PLANNING  Goal: Discharge to home or other facility with appropriate resources  Description: INTERVENTIONS:  - Identify barriers to discharge w/pt and caregiver  - Include patient/family/discharge partner in discharge planning  - Arrange for needed discharge resources and transportation as appropriate  - Identify discharge learning needs (meds, wound care, etc)  - Arrange for interpreters to assist at discharge as needed  - Consider post-discharge preferences of patient/family/discharge partner  - Complete POLST form as appropriate  - Assess patient's ability to be responsible for managing their own health  - Refer to Case Management Department for coordinating discharge planning if the patient needs post-hospital services based on physician/LIP order or complex needs related to functional status, cognitive ability or social support system  Outcome: Progressing     Problem: GENITOURINARY - ADULT  Goal: Absence of urinary retention  Description: INTERVENTIONS:  - Assess patient’s ability to void and empty bladder  - Monitor intake/output and perform bladder scan as needed  - Follow urinary retention protocol/standard of care  - Consider collaborating with pharmacy to review patient's medication profile  - Implement strategies to promote bladder emptying  Outcome: Progressing     Problem: GASTROINTESTINAL - ADULT  Goal:  Minimal or absence of nausea and vomiting  Description: INTERVENTIONS:  - Maintain adequate hydration with IV or PO as ordered and tolerated  - Nasogastric tube to low intermittent suction as ordered  - Evaluate effectiveness of ordered antiemetic medications  - Provide nonpharmacologic comfort measures as appropriate  - Advance diet as tolerated, if ordered  - Obtain nutritional consult as needed  - Evaluate fluid balance  Outcome: Progressing  Goal: Maintains or returns to baseline bowel function  Description: INTERVENTIONS:  - Assess bowel function  - Maintain adequate hydration with IV or PO as ordered and tolerated  - Evaluate effectiveness of GI medications  - Encourage mobilization and activity  - Obtain nutritional consult as needed  - Establish a toileting routine/schedule  - Consider collaborating with pharmacy to review patient's medication profile  Outcome: Progressing

## 2024-06-25 NOTE — PROGRESS NOTES
[facility] ADULT PROGRESS NOTE    SUBJECTIVE  Patient seen and examined, chart reviewed. No acute events overnight. Nausea from yesterday has resolved. Tolerating liquids. Notes she passed some gas yesterday, none today. No BM. No chest pain, SOB. Abdominal pain well controlled. 7/10 at present.      OBJECTIVE    Vital signs:   Vitals:    06/24/24 1820 06/24/24 1903 06/24/24 1946 06/25/24 0403   BP:   108/51 96/40   BP Location:   Right arm Right arm   Pulse: 88 77 89 98   Resp:   16 16   Temp:    98 °F (36.7 °C)   TempSrc:    Oral   SpO2:   91% 95%   Weight:       Height:           IOs:  06/23 1900 - 06/25 0659  In: 5651.7 [I.V.:5131.7]  Out: 1240 [Urine:450; Drains:790]    Physical examination:  Constitutional: General appearance: appears well, alert and oriented x 3, pleasant and cooperative.  HEENT: Neck supple  Chest: Normal respiratory effort  CV: Normal perfusion  Abdomen: Soft with appropriate tenderness. No rebound/rigidity. No CVA tenderness.  Incisions well approximated. No drainage.   RLQ drain with serosanguineous drainage.  Quiles draining elizabeth urine.     REVIEW OF LABORATORY, PATHOLOGY, AND RADIOLOGY DATA    CBC:   Lab Results   Component Value Date    WBC 9.4 06/25/2024    HGB 6.6 (LL) 06/25/2024    .0 06/25/2024           IMPRESSION/ PLAN  POD#1 robotic assisted right nephroureterectomy for upper tract urothelial carcinoma.     - Drop in Hgb noted. 1 unit PRBC ordered. We will repeat an HH after transfusion. No evidence of active bleeding on examination today.   - Trend creatinine  - Drain output trending down. Fluid creatinine consistent with serum. Will monitor overnight. Possible removal tomorrow.   - PO/IV pain medication as needed  - OOBTC and Ambulation today  - IS  - Subcutaneous heparin. Will hold if hgb continues to drop  - Quiles to remain in place x 2 weeks.   - Pathology pending  - Appreciate Dr. Woodard's assistance with care of this patient    Marv Chairez, DO  [facility] /  [facility]  Office [phone]

## 2024-06-25 NOTE — OCCUPATIONAL THERAPY NOTE
OCCUPATIONAL THERAPY EVALUATION - INPATIENT     Room Number: 440/440-A  Evaluation Date: 6/25/2024  Type of Evaluation: Initial  Presenting Problem: s/p nephroureterectomy    Physician Order: IP Consult to Occupational Therapy  Reason for Therapy: ADL/IADL Dysfunction and Discharge Planning    OCCUPATIONAL THERAPY ASSESSMENT   Patient is a 78 year old female admitted 6/24/2024 for robotic assisted laparoscopic right nephroureterectomy .  Prior to admission, patient was living alone in a one story home;stairs to basement laundry. Pt reports being independent w/ adls, ambulation w/o an ad and driving. Patient is currently functioning below baseline with adls and functional mobility.  Patient is requiring cga/minimal assist as a result of the following impairments: pain and dizziness,mobility restrictions following suregry . Occupational Therapy will continue to follow for duration of hospitalization.    Patient will benefit from continued skilled OT Services For duration of hospitalization, however, given the patient is functioning near baseline level do not anticipate skilled therapy needs at discharge     PLAN  OT Treatment Plan: Compensatory technique education;Patient/Family training;Patient/Family education;Endurance training;Functional transfer training;ADL training;Energy conservation/work simplification techniques     OCCUPATIONAL THERAPY MEDICAL/SOCIAL HISTORY   Problem List  Principal Problem:    Urothelial carcinoma (HCC)  Active Problems:    Type 2 diabetes mellitus with both eyes affected by mild nonproliferative retinopathy without macular edema, without long-term current use of insulin (HCC)    Essential hypertension    CKD (chronic kidney disease) stage 3, GFR 30-59 ml/min ([facility])    Hyperlipidemia    Urothelial carcinoma of kidney, right ([facility])    HOME SITUATION  Type of Home: House  Home Layout: One level (+ b asement laundry)  Lives With: -- (pt will have assists of sons)  Toilet and Equipment: Comfort  height toilet  Other Equipment: -- (rw)  Drives: Yes  Patient Regularly Uses: None    Stairs in Home: 5 stairs to enter  Use of Assistive Device(s): none    Prior Level of [place]:  Prior to admission, patient was living alone in a one story home;stairs to basement laundry. Pt reports being independent w/ adls, ambulation w/o an ad and driving.    SUBJECTIVE  \"Are we going to walk?\"    OCCUPATIONAL THERAPY EXAMINATION    OBJECTIVE  Precautions: Bed/chair alarm  Fall Risk: High fall risk    PAIN ASSESSMENT  Ratin (at rest)  Management Techniques: Activity promotion; Relaxation; Repositioning    ACTIVITY TOLERANCE  Limited by report of dizziness w/ activity  /36 supine;111/49 up in chair    O2 SATURATIONS  Activity on room air    Behavioral/Emotional/Social: pleasant,cooperative,motivated    RANGE OF MOTION   Upper extremity ROM is within functional limits     STRENGTH ASSESSMENT  Upper extremity strength is within functional limits     ACTIVITIES OF DAILY LIVING ASSESSMENT  AM-PAC ‘6-Clicks’ Inpatient Daily Activity Short Form  How much help from another person does the patient currently need…  -   Putting on and taking off regular lower body clothing?: A Little  -   Bathing (including washing, rinsing, drying)?: A Little  -   Toileting, which includes using toilet, bedpan or urinal? : A Little  -   Putting on and taking off regular upper body clothing?: None  -   Taking care of personal grooming such as brushing teeth?: None  -   Eating meals?: None    AM-PAC Score:  Score: 21  Approx Degree of Impairment: 32.79%  Standardized Score (AM-PAC Scale): 44.27  CMS Modifier (G-Code): CJ    FUNCTIONAL ADL ASSESSMENT  Eating: independent  Grooming: setup assist  UB Dressing: min assist  LB Dressing: min assist  Toileting: na    Skilled Therapy Provided: OT orders received and chart reviewed. Treatment coordinated w/ PT. Pt agreeable to participation in therapy. Gait belt used during dynamic activity. Pt  received in bed, alert and oriented x 4. Abdominal sparing strategies discussed w/ emphasis on adls and transfers. Pt verbalizing understanding of information as discussed. On initial contact pt required min a to log roll supine<>sit at eob. Pt performing sit<>stand bed/chair w/ cga. Pt ambulating short distance w/ rw and cga;activity limited by dizziness and low Hgb    At end of session pt remaining up in chair w/ all needs in reach. RN aware of pt's status and performance in therapy.     EDUCATION PROVIDED  Patient: Role of Occupational Therapy; Plan of Care; Functional Transfer Techniques; Fall Prevention; Surgical Precautions; Compensatory ADL Techniques; Proper Body Mechanics; Abdominal Protective Strategies  Patient's Response to Education: Verbalized Understanding    The patient's Approx Degree of Impairment: 32.79% has been calculated based on documentation in the [facility] '6 clicks' Inpatient Daily Activity Short Form.  Research supports that patients with this level of impairment may benefit from return to home.  Final disposition will be made by interdisciplinary medical team.     Patient End of Session: Up in chair;Needs met;Call light within reach;RN aware of session/findings;All patient questions and concerns addressed    OT Goals  Patients self stated goal is: to return to prior level of independence     Patient will complete functional transfer with mod i  Comment:     Patient will complete le dressing task with mod i  Comment:     Patient will tolerate standing for 5 minutes in prep for adls with supervision   Comment:             Goals  on: 7/3/24  Frequency:1-2 visits     Patient Evaluation Complexity Level:   Occupational Profile/Medical History MODERATE - Expanded review of history including review of medical or therapy record   Specific performance deficits impacting engagement in ADL/IADL MODERATE  3 - 5 performance deficits   Client Assessment/Performance Deficits LOW - No comorbidities  nor modifications of tasks    Clinical Decision Making MODERATE - Analysis of occupational profile, detailed assessments, several treatment options    Overall Complexity MODERATE     Therapeutic Activity: 15 minutes

## 2024-06-26 PROBLEM — Z01.818 PREOP TESTING: Status: ACTIVE | Noted: 2022-10-06

## 2024-06-26 LAB
ALBUMIN SERPL-MCNC: 2.9 G/DL (ref 3.2–4.8)
ANION GAP SERPL CALC-SCNC: 6 MMOL/L (ref 0–18)
ANION GAP SERPL CALC-SCNC: 6 MMOL/L (ref 0–18)
BASOPHILS # BLD AUTO: 0.03 X10(3) UL (ref 0–0.2)
BASOPHILS NFR BLD AUTO: 0.3 %
BLOOD TYPE BARCODE: 5100
BLOOD TYPE BARCODE: 5100
BUN BLD-MCNC: 18 MG/DL (ref 9–23)
BUN BLD-MCNC: 18 MG/DL (ref 9–23)
BUN/CREAT SERPL: 12.7 (ref 10–20)
BUN/CREAT SERPL: 12.7 (ref 10–20)
CALCIUM BLD-MCNC: 7.3 MG/DL (ref 8.7–10.4)
CALCIUM BLD-MCNC: 7.3 MG/DL (ref 8.7–10.4)
CHLORIDE SERPL-SCNC: 114 MMOL/L (ref 98–112)
CHLORIDE SERPL-SCNC: 114 MMOL/L (ref 98–112)
CO2 SERPL-SCNC: 20 MMOL/L (ref 21–32)
CO2 SERPL-SCNC: 20 MMOL/L (ref 21–32)
CREAT BLD-MCNC: 1.42 MG/DL
CREAT BLD-MCNC: 1.42 MG/DL
CREAT FLD-MCNC: 1.91 MG/DL
DEPRECATED RDW RBC AUTO: 44.7 FL (ref 35.1–46.3)
EGFRCR SERPLBLD CKD-EPI 2021: 38 ML/MIN/1.73M2 (ref 60–?)
EGFRCR SERPLBLD CKD-EPI 2021: 38 ML/MIN/1.73M2 (ref 60–?)
EOSINOPHIL # BLD AUTO: 0.41 X10(3) UL (ref 0–0.7)
EOSINOPHIL NFR BLD AUTO: 3.6 %
ERYTHROCYTE [DISTWIDTH] IN BLOOD BY AUTOMATED COUNT: 14.2 % (ref 11–15)
GLUCOSE BLD-MCNC: 112 MG/DL (ref 70–99)
GLUCOSE BLD-MCNC: 112 MG/DL (ref 70–99)
GLUCOSE BLDC GLUCOMTR-MCNC: 109 MG/DL (ref 70–99)
GLUCOSE BLDC GLUCOMTR-MCNC: 114 MG/DL (ref 70–99)
GLUCOSE BLDC GLUCOMTR-MCNC: 127 MG/DL (ref 70–99)
GLUCOSE BLDC GLUCOMTR-MCNC: 128 MG/DL (ref 70–99)
HCT VFR BLD AUTO: 31.9 %
HGB BLD-MCNC: 10.4 G/DL
IMM GRANULOCYTES # BLD AUTO: 0.06 X10(3) UL (ref 0–1)
IMM GRANULOCYTES NFR BLD: 0.5 %
LYMPHOCYTES # BLD AUTO: 1.85 X10(3) UL (ref 1–4)
LYMPHOCYTES NFR BLD AUTO: 16.2 %
MAGNESIUM SERPL-MCNC: 1.3 MG/DL (ref 1.6–2.6)
MCH RBC QN AUTO: 28.4 PG (ref 26–34)
MCHC RBC AUTO-ENTMCNC: 32.6 G/DL (ref 31–37)
MCV RBC AUTO: 87.2 FL
MONOCYTES # BLD AUTO: 0.91 X10(3) UL (ref 0.1–1)
MONOCYTES NFR BLD AUTO: 8 %
NEUTROPHILS # BLD AUTO: 8.17 X10 (3) UL (ref 1.5–7.7)
NEUTROPHILS # BLD AUTO: 8.17 X10(3) UL (ref 1.5–7.7)
NEUTROPHILS NFR BLD AUTO: 71.4 %
OSMOLALITY SERPL CALC.SUM OF ELEC: 293 MOSM/KG (ref 275–295)
OSMOLALITY SERPL CALC.SUM OF ELEC: 293 MOSM/KG (ref 275–295)
PHOSPHATE SERPL-MCNC: 2.9 MG/DL (ref 2.4–5.1)
PLATELET # BLD AUTO: 258 10(3)UL (ref 150–450)
POTASSIUM SERPL-SCNC: 4 MMOL/L (ref 3.5–5.1)
POTASSIUM SERPL-SCNC: 4 MMOL/L (ref 3.5–5.1)
RBC # BLD AUTO: 3.66 X10(6)UL
SODIUM SERPL-SCNC: 140 MMOL/L (ref 136–145)
SODIUM SERPL-SCNC: 140 MMOL/L (ref 136–145)
UNIT VOLUME: 350 ML
UNIT VOLUME: 350 ML
WBC # BLD AUTO: 11.4 X10(3) UL (ref 4–11)

## 2024-06-26 PROCEDURE — 99233 SBSQ HOSP IP/OBS HIGH 50: CPT | Performed by: INTERNAL MEDICINE

## 2024-06-26 RX ORDER — MAGNESIUM OXIDE 400 MG/1
800 TABLET ORAL ONCE
Status: COMPLETED | OUTPATIENT
Start: 2024-06-26 | End: 2024-06-26

## 2024-06-26 RX ORDER — HEPARIN SODIUM 5000 [USP'U]/ML
5000 INJECTION, SOLUTION INTRAVENOUS; SUBCUTANEOUS EVERY 8 HOURS SCHEDULED
Status: DISCONTINUED | OUTPATIENT
Start: 2024-06-26 | End: 2024-06-28

## 2024-06-26 NOTE — PROGRESS NOTES
Brief  Note    Reassessed patient this evening.  There was concern this afternoon that pacheco was not draining and she leaked some urine around pacheco after feeling strong urge to urinate.  RN gently instilled small volume saline per our instruction.  On my evaluation now, there is about 50 mL of light pink urine in the collection bag and some draining in the tubing.  MICHAEL drain output is high but quite light in color.  Mostly serous.  Repeat Hgb 10.6.  Will continue to monitor closely.      Adriane Torres MD  [facility]  Office [phone]

## 2024-06-26 NOTE — PLAN OF CARE
Pt complained of leaking from the pacheco and pain. Pt was reassessed and noted to be dry and pacheco draining adequately after toggling with tubing.  Pt is alert and oriented on RA. Pt is primarily [place] speaking. Remote tele in place. Pacheco is in place. IVF and abx running as ordered Pain managed with Tylenol and IV dilaudid. Pt is on a CLD. Pt denies any nausea or vomiting. MICHAEL drain managed as ordered. Call light is within reach and safety measures are in place.     Problem: Diabetes/Glucose Control  Goal: Glucose maintained within prescribed range  Description: INTERVENTIONS:  - Monitor Blood Glucose as ordered  - Assess for signs and symptoms of hyperglycemia and hypoglycemia  - Administer ordered medications to maintain glucose within target range  - Assess barriers to adequate nutritional intake and initiate nutrition consult as needed  - Instruct patient on self management of diabetes  Outcome: Progressing     Problem: PAIN - ADULT  Goal: Verbalizes/displays adequate comfort level or patient's stated pain goal  Description: INTERVENTIONS:  - Encourage pt to monitor pain and request assistance  - Assess pain using appropriate pain scale  - Administer analgesics based on type and severity of pain and evaluate response  - Implement non-pharmacological measures as appropriate and evaluate response  - Consider cultural and social influences on pain and pain management  - Manage/alleviate anxiety  - Utilize distraction and/or relaxation techniques  - Monitor for opioid side effects  - Notify MD/LIP if interventions unsuccessful or patient reports new pain  - Anticipate increased pain with activity and pre-medicate as appropriate  Outcome: Progressing     Problem: RISK FOR INFECTION - ADULT  Goal: Absence of fever/infection during anticipated neutropenic period  Description: INTERVENTIONS  - Monitor WBC  - Administer growth factors as ordered  - Implement neutropenic guidelines  Outcome: Progressing     Problem:  SAFETY ADULT - FALL  Goal: Free from fall injury  Description: INTERVENTIONS:  - Assess pt frequently for physical needs  - Identify cognitive and physical deficits and behaviors that affect risk of falls.  - [place] fall precautions as indicated by assessment.  - Educate pt/family on patient safety including physical limitations  - Instruct pt to call for assistance with activity based on assessment  - Modify environment to reduce risk of injury  - Provide assistive devices as appropriate  - Consider OT/PT consult to assist with strengthening/mobility  - Encourage toileting schedule  Outcome: Progressing     Problem: DISCHARGE PLANNING  Goal: Discharge to home or other facility with appropriate resources  Description: INTERVENTIONS:  - Identify barriers to discharge w/pt and caregiver  - Include patient/family/discharge partner in discharge planning  - Arrange for needed discharge resources and transportation as appropriate  - Identify discharge learning needs (meds, wound care, etc)  - Arrange for interpreters to assist at discharge as needed  - Consider post-discharge preferences of patient/family/discharge partner  - Complete POLST form as appropriate  - Assess patient's ability to be responsible for managing their own health  - Refer to Case Management Department for coordinating discharge planning if the patient needs post-hospital services based on physician/LIP order or complex needs related to functional status, cognitive ability or social support system  Outcome: Progressing     Problem: GENITOURINARY - ADULT  Goal: Absence of urinary retention  Description: INTERVENTIONS:  - Assess patient’s ability to void and empty bladder  - Monitor intake/output and perform bladder scan as needed  - Follow urinary retention protocol/standard of care  - Consider collaborating with pharmacy to review patient's medication profile  - Implement strategies to promote bladder emptying  Outcome: Progressing     Problem:  GASTROINTESTINAL - ADULT  Goal: Minimal or absence of nausea and vomiting  Description: INTERVENTIONS:  - Maintain adequate hydration with IV or PO as ordered and tolerated  - Nasogastric tube to low intermittent suction as ordered  - Evaluate effectiveness of ordered antiemetic medications  - Provide nonpharmacologic comfort measures as appropriate  - Advance diet as tolerated, if ordered  - Obtain nutritional consult as needed  - Evaluate fluid balance  Outcome: Progressing  Goal: Maintains or returns to baseline bowel function  Description: INTERVENTIONS:  - Assess bowel function  - Maintain adequate hydration with IV or PO as ordered and tolerated  - Evaluate effectiveness of GI medications  - Encourage mobilization and activity  - Obtain nutritional consult as needed  - Establish a toileting routine/schedule  - Consider collaborating with pharmacy to review patient's medication profile  Outcome: Progressing

## 2024-06-26 NOTE — PLAN OF CARE
Patient alert oriented, up walking in hallway and in room, stand by with walker, medicated for pain with scheduled tylenol, pacheco cat draining, few brownish particles noted in tube, at times pacheco stops draining and pt c/o abd pain, gently flushed noted pacheco started to drain, diet advanced to general carbs controlled, good appetite, pt had BM today.  Problem: Patient Centered Care  Goal: Patient preferences are identified and integrated in the patient's plan of care  Description: Interventions:  - What would you like us to know as we care for you? I live at home with my 2 sons.  - Provide timely, complete, and accurate information to patient/family  - Incorporate patient and family knowledge, values, beliefs, and cultural backgrounds into the planning and delivery of care  - Encourage patient/family to participate in care and decision-making at the level they choose  - Honor patient and family perspectives and choices  Outcome: Progressing

## 2024-06-26 NOTE — SPIRITUAL CARE NOTE
Spiritual Care Visit Note    Patient Name: Toya Mcdaniel Date of Spiritual Care Visit: 24   : 1945 Primary Dx: Urothelial carcinoma (HCC)       Referred By: Referral From: Care Coordination    Spiritual Care Taxonomy:    Intended Effects: Aligning care plan with patient's values    Methods: Offer support    Interventions: Assist someone with Advance Directives    Visit Type/Summary:     - PoA: Other:  visited pt per [facility] consult for POA counseling. Pt stated she has current POAHC. She said on file with her primary care doctor. I encouraged her if able to have family member to bring copy to [facility] and ask nurse to contact  to copy to chart and complete documentation in EPIC. She confirmed name and phone of  already in Healthcare Directives section of electronic chart.   remains available for follow up.    Spiritual Care support can be requested via an [facility] consult. For urgent/immediate needs, please contact the On Call  at: [place]: ext [phone]    Rev. Nicholas Bravo Mdiv.

## 2024-06-26 NOTE — PROGRESS NOTES
[facility]  part of [facility]    Progress Note    Toya Mcdaniel Patient Status:  Inpatient    1945 MRN [id number]   Location [facility] 4W/SW/SE Attending Jorge Woodard MD   Hosp Day # 2 PCP William Panchal MD       Subjective:   Toya Mcdaniel is a(n) 78 year old female was seen and examined  Reports feeling much better than before. Denied any active complaints at the time of interview.     Objective:   Blood pressure 121/49, pulse 86, temperature 97.3 °F (36.3 °C), temperature source Temporal, resp. rate 16, height 4' 9\" (1.448 m), weight 137 lb (62.1 kg), SpO2 96%, not currently breastfeeding.    GENERAL:  Alert and oriented to time, place, and person.  No acute distress.  LUNGS:  Clear to auscultation bilaterally.  Normal respiratory effort.  HEART:  Regular rate, rhythm.  S1 and S2 auscultated.  No murmur.  ABDOMEN:  Soft, nondistended.  Laparoscopic incisions right lower quadrant.  MICHAEL drain present.  Hypoactive bowel sounds.  No tenderness.  EXTREMITIES:  No peripheral edema, clubbing, or cyanosis.  NEUROLOGIC:  Motor and sensory intact.     Current Inpatient Medications:     Current Facility-Administered Medications:     heparin (Porcine) 5000 UNIT/ML injection 5,000 Units, 5,000 Units, Subcutaneous, Q8H ELENI    metoprolol tartrate (Lopressor) tab 25 mg, 25 mg, Oral, BID    atorvastatin (Lipitor) tab 10 mg, 10 mg, Oral, Nightly    isosorbide mononitrate ER (Imdur) 24 hr tab 30 mg, 30 mg, Oral, Daily    latanoprost (Xalatan) 0.005 % ophthalmic solution 1 drop, 1 drop, Both Eyes, Nightly    pantoprazole (Protonix) DR tab 40 mg, 40 mg, Oral, Before breakfast    glucose (Dex4) 15 GM/59ML oral liquid 15 g, 15 g, Oral, Q15 Min PRN **OR** glucose (Glutose) 40% oral gel 15 g, 15 g, Oral, Q15 Min PRN **OR** glucose-vitamin C (Dex-4) chewable tab 4 tablet, 4 tablet, Oral, Q15 Min PRN **OR** dextrose 50% injection 50 mL, 50 mL, Intravenous, Q15 Min PRN **OR** glucose  (Dex4) 15 GM/59ML oral liquid 30 g, 30 g, Oral, Q15 Min PRN **OR** glucose (Glutose) 40% oral gel 30 g, 30 g, Oral, Q15 Min PRN **OR** glucose-vitamin C (Dex-4) chewable tab 8 tablet, 8 tablet, Oral, Q15 Min PRN    insulin aspart (NovoLOG) 100 Units/mL FlexPen 1-7 Units, 1-7 Units, Subcutaneous, TID CC    sennosides (Senokot) tab 17.2 mg, 17.2 mg, Oral, Nightly    docusate sodium (Colace) cap 100 mg, 100 mg, Oral, BID    polyethylene glycol (PEG 3350) (Miralax) 17 g oral packet 17 g, 17 g, Oral, Daily PRN    magnesium hydroxide (Milk of Magnesia) 400 MG/5ML oral suspension 30 mL, 30 mL, Oral, Daily PRN    bisacodyl (Dulcolax) 10 MG rectal suppository 10 mg, 10 mg, Rectal, Daily PRN    fleet enema (Fleet) 7-19 GM/118ML rectal enema 133 mL, 1 enema, Rectal, Once PRN    ondansetron (Zofran) 4 MG/2ML injection 4 mg, 4 mg, Intravenous, Q6H PRN    metoclopramide (Reglan) 5 mg/mL injection 10 mg, 10 mg, Intravenous, Q8H PRN    sodium chloride 0.9% infusion, , Intravenous, Continuous    acetaminophen (Tylenol) tab 650 mg, 650 mg, Oral, Q4H While awake    oxyCODONE immediate release tab 2.5 mg, 2.5 mg, Oral, Q4H PRN **OR** oxyCODONE immediate release tab 5 mg, 5 mg, Oral, Q4H PRN    HYDROmorphone (Dilaudid) 1 MG/ML injection 0.2 mg, 0.2 mg, Intravenous, Q2H PRN **OR** HYDROmorphone (Dilaudid) 1 MG/ML injection 0.4 mg, 0.4 mg, Intravenous, Q2H PRN    famotidine (Pepcid) tab 20 mg, 20 mg, Oral, BID **OR** famotidine (Pepcid) 20 mg/2mL injection 20 mg, 20 mg, Intravenous, BID      Results:     Recent Labs   Lab 06/24/24  2013 06/25/24  0719 06/25/24  1841 06/26/24  0602   RBC 2.96* 2.39*  --  3.66*   HGB 8.2* 6.6* 10.6* 10.4*   HCT 27.3* 21.5* 32.3* 31.9*   MCV 92.2 90.0  --  87.2   MCH 27.7 27.6  --  28.4   MCHC 30.0* 30.7*  --  32.6   RDW 13.7 13.9  --  14.2   NEPRELIM  --  6.86  --  8.17*   WBC 13.1* 9.4  --  11.4*   .0 242.0  --  258.0         Recent Labs   Lab 06/24/24  1612 06/25/24  0719 06/26/24  0602   *  136* 112*  112*   BUN 23 24* 18  18   CREATSERUM 1.14* 1.48* 1.42*  1.42*   EGFRCR 50* 36* 38*  38*   CA 7.8* 7.3* 7.3*  7.3*    142 140  140   K 5.1 5.0 4.0  4.0    113* 114*  114*   CO2 22.0 22.0 20.0*  20.0*         Imaging:   No results found.        Assessment and Plan:     Right renal pelvis high-grade urothelial carcinoma - status post robot-assisted laparoscopic right nephroureterectomy  Pain control adequate   Cont Monitor hemodynamic status  Hold DVT prophylaxis due to anemia  Full pathology report still pending    Acute blood loss anemia  Due to post-operative status  s/p 2U pRBC, Hb this AM 10.4  Monitor H&H, transfuse as indicated  Monitor vitals    Diabetes mellitus type 2  Monitor Accu-Cheks  Cont CF insulin    Essential hypertension   Hold imdur due to hypotension  Continue metoprolol  Monitor vitals closeley    CKD 3  Hold lisinopril and monitor kidney function  Cr up today likely due to hypotension, dehydration  Check Cr in AM     Hyperlipidemia  Continue home medications.    Full Code

## 2024-06-27 LAB
ALBUMIN SERPL-MCNC: 2.9 G/DL (ref 3.2–4.8)
ALBUMIN/GLOB SERPL: 1.6 {RATIO} (ref 1–2)
ALP LIVER SERPL-CCNC: 98 U/L
ALT SERPL-CCNC: <7 U/L
ANION GAP SERPL CALC-SCNC: 7 MMOL/L (ref 0–18)
ANION GAP SERPL CALC-SCNC: 7 MMOL/L (ref 0–18)
AST SERPL-CCNC: 38 U/L (ref ?–34)
BASOPHILS # BLD AUTO: 0.03 X10(3) UL (ref 0–0.2)
BASOPHILS NFR BLD AUTO: 0.3 %
BILIRUB SERPL-MCNC: 0.4 MG/DL (ref 0.2–1.1)
BUN BLD-MCNC: 16 MG/DL (ref 9–23)
BUN BLD-MCNC: 16 MG/DL (ref 9–23)
BUN/CREAT SERPL: 12.4 (ref 10–20)
BUN/CREAT SERPL: 12.4 (ref 10–20)
CALCIUM BLD-MCNC: 7.2 MG/DL (ref 8.7–10.4)
CALCIUM BLD-MCNC: 7.2 MG/DL (ref 8.7–10.4)
CHLORIDE SERPL-SCNC: 118 MMOL/L (ref 98–112)
CHLORIDE SERPL-SCNC: 118 MMOL/L (ref 98–112)
CO2 SERPL-SCNC: 20 MMOL/L (ref 21–32)
CO2 SERPL-SCNC: 20 MMOL/L (ref 21–32)
CREAT BLD-MCNC: 1.29 MG/DL
CREAT BLD-MCNC: 1.29 MG/DL
DEPRECATED RDW RBC AUTO: 47.1 FL (ref 35.1–46.3)
EGFRCR SERPLBLD CKD-EPI 2021: 43 ML/MIN/1.73M2 (ref 60–?)
EGFRCR SERPLBLD CKD-EPI 2021: 43 ML/MIN/1.73M2 (ref 60–?)
EOSINOPHIL # BLD AUTO: 0.44 X10(3) UL (ref 0–0.7)
EOSINOPHIL NFR BLD AUTO: 3.8 %
ERYTHROCYTE [DISTWIDTH] IN BLOOD BY AUTOMATED COUNT: 14.6 % (ref 11–15)
GLOBULIN PLAS-MCNC: 1.8 G/DL (ref 2–3.5)
GLUCOSE BLD-MCNC: 107 MG/DL (ref 70–99)
GLUCOSE BLD-MCNC: 107 MG/DL (ref 70–99)
GLUCOSE BLDC GLUCOMTR-MCNC: 108 MG/DL (ref 70–99)
GLUCOSE BLDC GLUCOMTR-MCNC: 111 MG/DL (ref 70–99)
GLUCOSE BLDC GLUCOMTR-MCNC: 133 MG/DL (ref 70–99)
GLUCOSE BLDC GLUCOMTR-MCNC: 135 MG/DL (ref 70–99)
HCT VFR BLD AUTO: 31.4 %
HGB BLD-MCNC: 10.1 G/DL
IMM GRANULOCYTES # BLD AUTO: 0.06 X10(3) UL (ref 0–1)
IMM GRANULOCYTES NFR BLD: 0.5 %
LYMPHOCYTES # BLD AUTO: 2.38 X10(3) UL (ref 1–4)
LYMPHOCYTES NFR BLD AUTO: 20.8 %
MAGNESIUM SERPL-MCNC: 1.6 MG/DL (ref 1.6–2.6)
MCH RBC QN AUTO: 28.5 PG (ref 26–34)
MCHC RBC AUTO-ENTMCNC: 32.2 G/DL (ref 31–37)
MCV RBC AUTO: 88.5 FL
MONOCYTES # BLD AUTO: 0.75 X10(3) UL (ref 0.1–1)
MONOCYTES NFR BLD AUTO: 6.6 %
NEUTROPHILS # BLD AUTO: 7.77 X10 (3) UL (ref 1.5–7.7)
NEUTROPHILS # BLD AUTO: 7.77 X10(3) UL (ref 1.5–7.7)
NEUTROPHILS NFR BLD AUTO: 68 %
OSMOLALITY SERPL CALC.SUM OF ELEC: 302 MOSM/KG (ref 275–295)
OSMOLALITY SERPL CALC.SUM OF ELEC: 302 MOSM/KG (ref 275–295)
PLATELET # BLD AUTO: 265 10(3)UL (ref 150–450)
POTASSIUM SERPL-SCNC: 4 MMOL/L (ref 3.5–5.1)
POTASSIUM SERPL-SCNC: 4 MMOL/L (ref 3.5–5.1)
PROT SERPL-MCNC: 4.7 G/DL (ref 5.7–8.2)
RBC # BLD AUTO: 3.55 X10(6)UL
SODIUM SERPL-SCNC: 145 MMOL/L (ref 136–145)
SODIUM SERPL-SCNC: 145 MMOL/L (ref 136–145)
WBC # BLD AUTO: 11.4 X10(3) UL (ref 4–11)

## 2024-06-27 PROCEDURE — 99233 SBSQ HOSP IP/OBS HIGH 50: CPT | Performed by: INTERNAL MEDICINE

## 2024-06-27 RX ORDER — FAMOTIDINE 10 MG/ML
20 INJECTION, SOLUTION INTRAVENOUS DAILY
Status: DISCONTINUED | OUTPATIENT
Start: 2024-06-28 | End: 2024-06-28

## 2024-06-27 RX ORDER — MAGNESIUM OXIDE 400 MG/1
400 TABLET ORAL ONCE
Status: COMPLETED | OUTPATIENT
Start: 2024-06-27 | End: 2024-06-27

## 2024-06-27 RX ORDER — FAMOTIDINE 20 MG/1
20 TABLET, FILM COATED ORAL DAILY
Status: DISCONTINUED | OUTPATIENT
Start: 2024-06-28 | End: 2024-06-28

## 2024-06-27 NOTE — PHYSICAL THERAPY NOTE
PHYSICAL THERAPY TREATMENT NOTE - INPATIENT     Room Number: 440/440-A       Presenting Problem: XI ROBOT-ASSISTED LAPAROSCOPIC NEPHROURETERECTOMY ()  Co-Morbidities : urotherial ca    Problem List  Principal Problem:    Urothelial carcinoma (HCC)  Active Problems:    Type 2 diabetes mellitus with both eyes affected by mild nonproliferative retinopathy without macular edema, without long-term current use of insulin (HCC)    Essential hypertension    CKD (chronic kidney disease) stage 3, GFR 30-59 ml/min (Formerly [facility])    Hyperlipidemia    Preop testing    Urothelial carcinoma of kidney, right (Formerly [facility])      PHYSICAL THERAPY ASSESSMENT   Patient demonstrates good  progress this session, goals  remain in progress.    Patient continues to function below baseline with bed mobility, transfers, gait, stair negotiation, and standing prolonged periods.  Contributing factors to remaining limitations include decreased functional strength, decreased endurance/aerobic capacity, pain, and decreased muscular endurance.  Next session anticipate patient to progress bed mobility, transfers, gait, stair negotiation, and standing prolonged periods.  Physical Therapy will continue to follow patient for duration of hospitalization.    Patient continues to benefit from continued skilled PT services: at discharge to promote functional independence and safety with additional support and return home with home health PT.    PLAN  PT Treatment Plan: Bed mobility;Body mechanics;Coordination;Endurance;Energy conservation;Patient education;Gait training;Strengthening;Stoop training;Stair training;Transfer training;Balance training  Frequency (Obs): 5x/week    SUBJECTIVE  Pt was agreeable to therapy session.         OBJECTIVE  Precautions: Bed/chair alarm    WEIGHT BEARING RESTRICTION                PAIN ASSESSMENT   Ratin  Location: abdominal  Management Techniques: Activity promotion;Body mechanics;Relaxation;Repositioning    BALANCE  Static  Sitting: Good  Dynamic Sitting: Fair +  Static Standing: Fair  Dynamic Standing: Fair -    ACTIVITY TOLERANCE                          O2 WALK       AM-PAC '6-Clicks' INPATIENT SHORT FORM - BASIC MOBILITY  How much difficulty does the patient currently have...  Patient Difficulty: Turning over in bed (including adjusting bedclothes, sheets and blankets)?: A Little   Patient Difficulty: Sitting down on and standing up from a chair with arms (e.g., wheelchair, bedside commode, etc.): A Little   Patient Difficulty: Moving from lying on back to sitting on the side of the bed?: A Little   How much help from another person does the patient currently need...   Help from Another: Moving to and from a bed to a chair (including a wheelchair)?: A Little   Help from Another: Need to walk in hospital room?: A Little   Help from Another: Climbing 3-5 steps with a railing?: A Little     AM-PAC Score:  Raw Score: 18   Approx Degree of Impairment: 46.58%   Standardized Score (AM-PAC Scale): 43.63   CMS Modifier (G-Code): CK    FUNCTIONAL ABILITY STATUS  Functional Mobility/Gait Assessment  Gait Assistance: Supervision  Distance (ft): 300'  Assistive Device: Rolling walker  Pattern:  (narrow JULIAN)  Stairs: Stairs  How Many Stairs: 12  Device: 2 Rails  Assist: Contact guard assist  Pattern: Ascend and Descend  Ascend and Descend : Reciprocal  Rolling:  NT  Supine to Sit:  NT  Sit to Supine:  NT  Sit to Stand: stand-by assist    Additional information: Pt is received in the chair and was cleared for therapy session. Pt reported some slight dizziness with her abdominal pain and comes and goes. Pt is SBA with sit<>stand transfers with the RW. Pt was able to AMB to the therapy gym and back for stair training. Pt was able to AMB about 300' with the RW SBA. Pt with slow gait and decreased step length with narrow JULIAN but with very good balance and safety awareness. Pt was brought to the therapy gym for stair training. Pt took a few minute  sitting rest break before stair training.  Pt was educated and able to negotiate 12 stairs with 2 HR's CGA for safety. Pt is cued for proper technique and sequencing. Pt with very good balance on the stairs. Returned pt back to the room and to sitting in the chair with all needs within reach. Pt is on track to dc to home once medically cleared.     The patient's Approx Degree of Impairment: 46.58% has been calculated based on documentation in the [facility] '6 clicks' Inpatient Daily Activity Short Form.  Research supports that patients with this level of impairment may benefit from home with assist and HHC .  Final disposition will be made by interdisciplinary medical team.        Patient End of Session: Up in chair;Needs met;Call light within reach;RN aware of session/findings;All patient questions and concerns addressed    CURRENT GOALS   Goals to be met by: 7/15  Patient Goal Patient's self-stated goal is: unstated    Goal #1 Patient is able to demonstrate supine - sit EOB @ level: supervision     Goal #1   Current Status NT received in the chair   Goal #2 Patient is able to demonstrate transfers Sit to/from Stand at assistance level: independent with none     Goal #2  Current Status SBA with the RW   Goal #3 Patient is able to ambulate 150 feet with assist device: none at assistance level: independent   Goal #3   Current Status 300' with the RW SBA    Goal #4 Patient will negotiate 6 stairs/one curb w/ assistive device and supervision   Goal #4   Current Status 12 stairs with 2 HR's CGA   Goal #5 Patient to demonstrate independence with home activity/exercise instructions provided to patient in preparation for discharge.   Goal #5   Current Status IN PROGRESS   Goal #6    Goal #6  Current Status        Therapeutic Activity: 25 minutes

## 2024-06-27 NOTE — PROGRESS NOTES
[facility]  part of [facility]    Progress Note    Toya Mcdaniel Patient Status:  Inpatient    1945 MRN [id number]   Location [facility] 4W/SW/SE Attending Jorge Woodard MD   Hosp Day # 3 PCP William Panchal MD       Subjective:   Toya Mcdaniel is a(n) 78 year old female was seen and examined  Reports feeling much better than before. Denied any active complaints at the time of interview.   Looking forward to discharging soon.    Objective:   Blood pressure 146/62, pulse 67, temperature 97.5 °F (36.4 °C), temperature source Axillary, resp. rate 16, height 4' 9\" (1.448 m), weight 137 lb (62.1 kg), SpO2 98%, not currently breastfeeding.    GENERAL:  Alert and oriented to time, place, and person.  No acute distress.  LUNGS:  Clear to auscultation bilaterally.  Normal respiratory effort.  HEART:  Regular rate, rhythm.  S1 and S2 auscultated.  No murmur.  ABDOMEN:  Soft, nondistended.  Laparoscopic incisions right lower quadrant.  MICHAEL drain present.  Hypoactive bowel sounds.  No tenderness.  EXTREMITIES:  No peripheral edema, clubbing, or cyanosis.  NEUROLOGIC:  Motor and sensory intact.     Current Inpatient Medications:     Current Facility-Administered Medications:     heparin (Porcine) 5000 UNIT/ML injection 5,000 Units, 5,000 Units, Subcutaneous, Q8H ELENI    metoprolol tartrate (Lopressor) tab 25 mg, 25 mg, Oral, BID    atorvastatin (Lipitor) tab 10 mg, 10 mg, Oral, Nightly    isosorbide mononitrate ER (Imdur) 24 hr tab 30 mg, 30 mg, Oral, Daily    latanoprost (Xalatan) 0.005 % ophthalmic solution 1 drop, 1 drop, Both Eyes, Nightly    pantoprazole (Protonix) DR tab 40 mg, 40 mg, Oral, Before breakfast    glucose (Dex4) 15 GM/59ML oral liquid 15 g, 15 g, Oral, Q15 Min PRN **OR** glucose (Glutose) 40% oral gel 15 g, 15 g, Oral, Q15 Min PRN **OR** glucose-vitamin C (Dex-4) chewable tab 4 tablet, 4 tablet, Oral, Q15 Min PRN **OR** dextrose 50% injection 50 mL, 50 mL,  Intravenous, Q15 Min PRN **OR** glucose (Dex4) 15 GM/59ML oral liquid 30 g, 30 g, Oral, Q15 Min PRN **OR** glucose (Glutose) 40% oral gel 30 g, 30 g, Oral, Q15 Min PRN **OR** glucose-vitamin C (Dex-4) chewable tab 8 tablet, 8 tablet, Oral, Q15 Min PRN    insulin aspart (NovoLOG) 100 Units/mL FlexPen 1-7 Units, 1-7 Units, Subcutaneous, TID CC    sennosides (Senokot) tab 17.2 mg, 17.2 mg, Oral, Nightly    docusate sodium (Colace) cap 100 mg, 100 mg, Oral, BID    polyethylene glycol (PEG 3350) (Miralax) 17 g oral packet 17 g, 17 g, Oral, Daily PRN    magnesium hydroxide (Milk of Magnesia) 400 MG/5ML oral suspension 30 mL, 30 mL, Oral, Daily PRN    bisacodyl (Dulcolax) 10 MG rectal suppository 10 mg, 10 mg, Rectal, Daily PRN    fleet enema (Fleet) 7-19 GM/118ML rectal enema 133 mL, 1 enema, Rectal, Once PRN    ondansetron (Zofran) 4 MG/2ML injection 4 mg, 4 mg, Intravenous, Q6H PRN    metoclopramide (Reglan) 5 mg/mL injection 10 mg, 10 mg, Intravenous, Q8H PRN    sodium chloride 0.9% infusion, , Intravenous, Continuous    acetaminophen (Tylenol) tab 650 mg, 650 mg, Oral, Q4H While awake    oxyCODONE immediate release tab 2.5 mg, 2.5 mg, Oral, Q4H PRN **OR** oxyCODONE immediate release tab 5 mg, 5 mg, Oral, Q4H PRN    HYDROmorphone (Dilaudid) 1 MG/ML injection 0.2 mg, 0.2 mg, Intravenous, Q2H PRN **OR** HYDROmorphone (Dilaudid) 1 MG/ML injection 0.4 mg, 0.4 mg, Intravenous, Q2H PRN    famotidine (Pepcid) tab 20 mg, 20 mg, Oral, BID **OR** famotidine (Pepcid) 20 mg/2mL injection 20 mg, 20 mg, Intravenous, BID      Results:     Recent Labs   Lab 06/25/24  0719 06/25/24  1841 06/26/24  0602 06/27/24  0436   RBC 2.39*  --  3.66* 3.55*   HGB 6.6* 10.6* 10.4* 10.1*   HCT 21.5* 32.3* 31.9* 31.4*   MCV 90.0  --  87.2 88.5   MCH 27.6  --  28.4 28.5   MCHC 30.7*  --  32.6 32.2   RDW 13.9  --  14.2 14.6   NEPRELIM 6.86  --  8.17* 7.77*   WBC 9.4  --  11.4* 11.4*   .0  --  258.0 265.0       Recent Labs   Lab 06/25/24 0719  06/26/24  0602 06/27/24  0436   * 112*  112* 107*  107*   BUN 24* 18  18 16  16   CREATSERUM 1.48* 1.42*  1.42* 1.29*  1.29*   EGFRCR 36* 38*  38* 43*  43*   CA 7.3* 7.3*  7.3* 7.2*  7.2*    140  140 145  145   K 5.0 4.0  4.0 4.0  4.0   * 114*  114* 118*  118*   CO2 22.0 20.0*  20.0* 20.0*  20.0*         Imaging:   No results found.        Assessment and Plan:     Right renal pelvis high-grade urothelial carcinoma - s/p robot-assisted laparoscopic right nephroureterectomy  Pain control adequate   Cont Monitor hemodynamic status  Urology on consult - continues to have high drain output  Full pathology report still pending    Acute blood loss anemia  Due to post-operative status  s/p 2U pRBC, Hb this AM 10.4  Monitor H&H, transfuse as indicated  Monitor vitals    Diabetes mellitus type 2  Monitor Accu-Cheks  Cont CF insulin    Essential hypertension   Hold imdur due to hypotension  Continue metoprolol  Monitor vitals closeley    Hypotension  Dehydration  Encourage PO intake  Stop IVF  Monitor vitals    CKD 3  Hold lisinopril and monitor kidney function  Cr improved  Monitor renal function    Hyperlipidemia  Continue home medications.    Full Code  DVT ppx: Heparin s/c

## 2024-06-27 NOTE — PROGRESS NOTES
[facility]  **Delayed entry -- patient seen     SUBJECTIVE  Patient seen and examined, chart reviewed. No acute events overnight.  Quiles required flushing again once yesterday.  Drain output much lower.  Patient notes she is feeling much better today.       OBJECTIVE    Vital signs:   Vitals:    06/26/24 1903 06/26/24 2043 06/27/24 0338 06/27/24 0905   BP:  124/45 144/72 146/62   BP Location:  Right arm Right arm Right arm   Pulse: 76 81  67   Resp:  17 16 16   Temp:  98.4 °F (36.9 °C) 97.5 °F (36.4 °C)    TempSrc:  Oral Axillary    SpO2:  99% 97% 98%   Weight:       Height:           IOs:  06/25 1900 - 06/27 0659  In: 1200 [I.V.:1200]  Out: 4780 [Urine:3500; Drains:1280]    Physical examination:  Constitutional: General appearance: appears well, alert and oriented x 3, pleasant and cooperative.  HEENT: Neck supple  Chest: Normal respiratory effort  CV: Normal perfusion  Abdomen: Soft with appropriate tenderness. No rebound/rigidity. No CVA tenderness.  Incisions well approximated. No drainage.   RLQ drain with serosanguineous drainage.  Quiles draining clear yellow urine.     REVIEW OF LABORATORY, PATHOLOGY, AND RADIOLOGY DATA    CBC:   Lab Results   Component Value Date    WBC 11.4 (H) 06/27/2024    HGB 10.1 (L) 06/27/2024    .0 06/27/2024           IMPRESSION/ PLAN  POD#3 robotic assisted right nephroureterectomy for upper tract urothelial carcinoma.     S/p 2 U PRBC 6/25.  Hgb stable. Continue HSQ    Trending creatinine.  Likely to find new baseline in mononephric state.      Drain output low.  Likely remove tomorrow.  Quiles draining well with good output.      PRN pain control    Advance to general diet    OOB, in chair, and ambulating today.      Appreciate Dr. Hull's assistance in caring for this patient.    Leticia June MD  [facility]  Office [phone]

## 2024-06-27 NOTE — PROGRESS NOTES
[facility] ADULT PROGRESS NOTE  **Delayed entry -- patient seen     SUBJECTIVE  Patient seen and examined, chart reviewed. No acute events overnight.  Quiles drained well overnight.  Drain output continues to be high.  Patient notes she is feeling much better today.  No nausea or dizziness.  Pain is still present but less than yesterday.  She says she is passing a lot of gas.  Tolerating clear liquids.      OBJECTIVE    Vital signs:   Vitals:    06/26/24 1140 06/26/24 1234 06/26/24 1719 06/26/24 2043   BP: 126/51  114/42 124/45   BP Location: Right arm  Right arm Right arm   Pulse: 74 93 82 81   Resp: 16  16 17   Temp: 97.5 °F (36.4 °C)  98.4 °F (36.9 °C) 98.4 °F (36.9 °C)   TempSrc: Temporal  Oral Oral   SpO2: 96%  96% 99%   Weight:       Height:           IOs:  06/25 0700 - 06/26 1859  In: 2118.8 [P.O.:375; I.V.:1200]  Out: 5040 [Urine:3250; Drains:1790]    Physical examination:  Constitutional: General appearance: appears well, alert and oriented x 3, pleasant and cooperative.  HEENT: Neck supple  Chest: Normal respiratory effort  CV: Normal perfusion  Abdomen: Soft with appropriate tenderness. No rebound/rigidity. No CVA tenderness.  Incisions well approximated. No drainage.   RLQ drain with serosanguineous drainage.  Quiles draining clear yellow urine.     REVIEW OF LABORATORY, PATHOLOGY, AND RADIOLOGY DATA    CBC:   Lab Results   Component Value Date    WBC 11.4 (H) 06/26/2024    HGB 10.4 (L) 06/26/2024    .0 06/26/2024           IMPRESSION/ PLAN  POD#2 robotic assisted right nephroureterectomy for upper tract urothelial carcinoma.     S/p 2 U PRBC yesterday.  Hgb stable this morning.  HSQ restarted    Trending creatinine.  Likely to find new baseline in mononephric state.      Continues to have high drain output.  Fluid creatinine consistent with serum on POD#1.  Repeat fluid creatinine today is slightly elevated.  Continue to trend.  Quiles draining well with good output.      PRN pain  control    Advance to general diet    OOB, in chair, and ambulating today.      Pathology pending.    Adriane Torres MD  [facility]  Office [phone]

## 2024-06-27 NOTE — PLAN OF CARE
Pt A&Ox4. Room air. Tele monitor in place. No reports of dizziness/lightheadedness. BP stable overnight. Accuchecks monitored AC/HS. Quiles in place, leaking overnight; gently flushed with 20cc and urine flow resumed. MICHAEL drain in place with large amount of output noted. Pain controlled with tylenol. IVF infusing, tolerating general diet. Call light within reach.     Problem: Patient Centered Care  Goal: Patient preferences are identified and integrated in the patient's plan of care  Description: Interventions:  - What would you like us to know as we care for you? I live at home with my 2 sons.  - Provide timely, complete, and accurate information to patient/family  - Incorporate patient and family knowledge, values, beliefs, and cultural backgrounds into the planning and delivery of care  - Encourage patient/family to participate in care and decision-making at the level they choose  - Honor patient and family perspectives and choices  Outcome: Progressing     Problem: Patient/Family Goals  Goal: Patient/Family Long Term Goal  Description: Patient's Long Term Goal: Discharge home    Interventions:  - Manage pain after surgery  - Demonstrate proper care of Quiles catheter  - Regain bowel function  - See additional Care Plan goals for specific interventions  Outcome: Progressing  Goal: Patient/Family Short Term Goal  Description: Patient's Short Term Goal: Manage pain after surgery    Interventions:   - Transition from IV to oral pain medication  - Take periods of rest after periods of activity  - Splint abdomen with pillow before position changes  - See additional Care Plan goals for specific interventions  Outcome: Progressing     Problem: Diabetes/Glucose Control  Goal: Glucose maintained within prescribed range  Description: INTERVENTIONS:  - Monitor Blood Glucose as ordered  - Assess for signs and symptoms of hyperglycemia and hypoglycemia  - Administer ordered medications to maintain glucose within target range  -  Assess barriers to adequate nutritional intake and initiate nutrition consult as needed  - Instruct patient on self management of diabetes  Outcome: Progressing     Problem: PAIN - ADULT  Goal: Verbalizes/displays adequate comfort level or patient's stated pain goal  Description: INTERVENTIONS:  - Encourage pt to monitor pain and request assistance  - Assess pain using appropriate pain scale  - Administer analgesics based on type and severity of pain and evaluate response  - Implement non-pharmacological measures as appropriate and evaluate response  - Consider cultural and social influences on pain and pain management  - Manage/alleviate anxiety  - Utilize distraction and/or relaxation techniques  - Monitor for opioid side effects  - Notify MD/LIP if interventions unsuccessful or patient reports new pain  - Anticipate increased pain with activity and pre-medicate as appropriate  Outcome: Progressing     Problem: RISK FOR INFECTION - ADULT  Goal: Absence of fever/infection during anticipated neutropenic period  Description: INTERVENTIONS  - Monitor WBC  - Administer growth factors as ordered  - Implement neutropenic guidelines  Outcome: Progressing     Problem: SAFETY ADULT - FALL  Goal: Free from fall injury  Description: INTERVENTIONS:  - Assess pt frequently for physical needs  - Identify cognitive and physical deficits and behaviors that affect risk of falls.  - [place] fall precautions as indicated by assessment.  - Educate pt/family on patient safety including physical limitations  - Instruct pt to call for assistance with activity based on assessment  - Modify environment to reduce risk of injury  - Provide assistive devices as appropriate  - Consider OT/PT consult to assist with strengthening/mobility  - Encourage toileting schedule  Outcome: Progressing     Problem: DISCHARGE PLANNING  Goal: Discharge to home or other facility with appropriate resources  Description: INTERVENTIONS:  - Identify barriers to  discharge w/pt and caregiver  - Include patient/family/discharge partner in discharge planning  - Arrange for needed discharge resources and transportation as appropriate  - Identify discharge learning needs (meds, wound care, etc)  - Arrange for interpreters to assist at discharge as needed  - Consider post-discharge preferences of patient/family/discharge partner  - Complete POLST form as appropriate  - Assess patient's ability to be responsible for managing their own health  - Refer to Case Management Department for coordinating discharge planning if the patient needs post-hospital services based on physician/LIP order or complex needs related to functional status, cognitive ability or social support system  Outcome: Progressing     Problem: GENITOURINARY - ADULT  Goal: Absence of urinary retention  Description: INTERVENTIONS:  - Assess patient’s ability to void and empty bladder  - Monitor intake/output and perform bladder scan as needed  - Follow urinary retention protocol/standard of care  - Consider collaborating with pharmacy to review patient's medication profile  - Implement strategies to promote bladder emptying  Outcome: Progressing     Problem: GASTROINTESTINAL - ADULT  Goal: Minimal or absence of nausea and vomiting  Description: INTERVENTIONS:  - Maintain adequate hydration with IV or PO as ordered and tolerated  - Nasogastric tube to low intermittent suction as ordered  - Evaluate effectiveness of ordered antiemetic medications  - Provide nonpharmacologic comfort measures as appropriate  - Advance diet as tolerated, if ordered  - Obtain nutritional consult as needed  - Evaluate fluid balance  Outcome: Progressing  Goal: Maintains or returns to baseline bowel function  Description: INTERVENTIONS:  - Assess bowel function  - Maintain adequate hydration with IV or PO as ordered and tolerated  - Evaluate effectiveness of GI medications  - Encourage mobilization and activity  - Obtain nutritional consult  as needed  - Establish a toileting routine/schedule  - Consider collaborating with pharmacy to review patient's medication profile  Outcome: Progressing

## 2024-06-27 NOTE — CM/SW NOTE
06/27/24 1300   CM/SW Referral Data   Referral Source Social Work (self-referral)   Reason for Referral Discharge planning   Informant Patient   Medical Hx   Does patient have an established PCP? Yes  (William Abdulkadir)   Patient Info   Patient's Current Mental Status at Time of Assessment Alert;Oriented   Patient's Home Environment House   Number of Levels in Home 1   Number of Stair in Home 4-5 to enter   Patient lives with Son  (2 sons)   Patient Status Prior to Admission   Independent with ADLs and Mobility No   Pt. requires assistance with Ambulating   Services in place prior to admission DME/Supplies at home   Type of DME/Supplies Standard Walker   Discharge Needs   Anticipated D/C needs Home health care   Choice of Post-Acute Provider   Informed patient of right to choose their preferred provider Yes   List of appropriate post-acute services provided to patient/family with quality data Yes   Patient/family choice Pending   Information given to Patient       SW self referred pt for DC Planning after consulting w/ PT and confirming Anticipated therapy need: Home with Home Healthcare    HH referrals sent in Aidin. F2F entered/sent.    SW met w/ pt at bedside. Above assessment completed.    Pt lives w/ her 2 sons in a single level home that has 4-5 steps to enter. Pt confirmed using a walker for ambulation.    SW discussed HH services and benefits. Pt does not have hx w/ HH. Pt is open to HH.    Referral process and next steps explained. HH list of quality data left w/ pt at bedside.    Pt's son presented to the room at the end of the conversation. SW summarized discussion and HH list.    No questions at this time. Pt would like to review list prior to choice.    PLAN: Home w/ HH - pending choice & med clear      SW/CM to remain available for support and/or discharge planning.         Lakesha Fleming, MSW, LSW [id number]

## 2024-06-28 VITALS
RESPIRATION RATE: 18 BRPM | HEART RATE: 63 BPM | WEIGHT: 137 LBS | OXYGEN SATURATION: 100 % | SYSTOLIC BLOOD PRESSURE: 108 MMHG | DIASTOLIC BLOOD PRESSURE: 53 MMHG | HEIGHT: 57 IN | BODY MASS INDEX: 29.56 KG/M2 | TEMPERATURE: 98 F

## 2024-06-28 LAB
ANION GAP SERPL CALC-SCNC: 8 MMOL/L (ref 0–18)
BASOPHILS # BLD AUTO: 0.04 X10(3) UL (ref 0–0.2)
BASOPHILS NFR BLD AUTO: 0.4 %
BUN BLD-MCNC: 18 MG/DL (ref 9–23)
BUN/CREAT SERPL: 13.7 (ref 10–20)
CALCIUM BLD-MCNC: 7.6 MG/DL (ref 8.7–10.4)
CHLORIDE SERPL-SCNC: 114 MMOL/L (ref 98–112)
CO2 SERPL-SCNC: 18 MMOL/L (ref 21–32)
CREAT BLD-MCNC: 1.31 MG/DL
CREAT FLD-MCNC: 1.23 MG/DL
DEPRECATED RDW RBC AUTO: 47.3 FL (ref 35.1–46.3)
EGFRCR SERPLBLD CKD-EPI 2021: 42 ML/MIN/1.73M2 (ref 60–?)
EOSINOPHIL # BLD AUTO: 0.52 X10(3) UL (ref 0–0.7)
EOSINOPHIL NFR BLD AUTO: 5.3 %
ERYTHROCYTE [DISTWIDTH] IN BLOOD BY AUTOMATED COUNT: 14.7 % (ref 11–15)
GLUCOSE BLD-MCNC: 107 MG/DL (ref 70–99)
GLUCOSE BLDC GLUCOMTR-MCNC: 123 MG/DL (ref 70–99)
GLUCOSE BLDC GLUCOMTR-MCNC: 132 MG/DL (ref 70–99)
HCT VFR BLD AUTO: 32.4 %
HGB BLD-MCNC: 10.8 G/DL
IMM GRANULOCYTES # BLD AUTO: 0.08 X10(3) UL (ref 0–1)
IMM GRANULOCYTES NFR BLD: 0.8 %
LYMPHOCYTES # BLD AUTO: 2.87 X10(3) UL (ref 1–4)
LYMPHOCYTES NFR BLD AUTO: 29.1 %
MAGNESIUM SERPL-MCNC: 1.7 MG/DL (ref 1.6–2.6)
MCH RBC QN AUTO: 29.4 PG (ref 26–34)
MCHC RBC AUTO-ENTMCNC: 33.3 G/DL (ref 31–37)
MCV RBC AUTO: 88.3 FL
MONOCYTES # BLD AUTO: 0.75 X10(3) UL (ref 0.1–1)
MONOCYTES NFR BLD AUTO: 7.6 %
NEUTROPHILS # BLD AUTO: 5.59 X10 (3) UL (ref 1.5–7.7)
NEUTROPHILS # BLD AUTO: 5.59 X10(3) UL (ref 1.5–7.7)
NEUTROPHILS NFR BLD AUTO: 56.8 %
OSMOLALITY SERPL CALC.SUM OF ELEC: 292 MOSM/KG (ref 275–295)
PLATELET # BLD AUTO: 294 10(3)UL (ref 150–450)
POTASSIUM SERPL-SCNC: 3.9 MMOL/L (ref 3.5–5.1)
RBC # BLD AUTO: 3.67 X10(6)UL
SODIUM SERPL-SCNC: 140 MMOL/L (ref 136–145)
WBC # BLD AUTO: 9.9 X10(3) UL (ref 4–11)

## 2024-06-28 PROCEDURE — 99239 HOSP IP/OBS DSCHRG MGMT >30: CPT | Performed by: INTERNAL MEDICINE

## 2024-06-28 RX ORDER — HYDROCODONE BITARTRATE AND ACETAMINOPHEN 5; 325 MG/1; MG/1
1-2 TABLET ORAL EVERY 4 HOURS PRN
Qty: 15 TABLET | Refills: 0 | Status: SHIPPED | OUTPATIENT
Start: 2024-06-28

## 2024-06-28 RX ORDER — MAGNESIUM OXIDE 400 MG/1
400 TABLET ORAL ONCE
Status: COMPLETED | OUTPATIENT
Start: 2024-06-28 | End: 2024-06-28

## 2024-06-28 NOTE — PLAN OF CARE
Patient taught irrigation of Quiles cathter with 20mL sterile water prn. Patient returned demonstration on irrigation. Additionally, patient verbalized understanding on how to change leg bag to large bag for overnight use.

## 2024-06-28 NOTE — PLAN OF CARE
Appetite good, monitoring blood sugar, up to chair and ambulating in hallway, frequently emptying selvin drain, flushed pacheco 1x due to clots with good results, schedule tylenol/oxy for pain control, magnesium replaced.  Plan is home with pacheco, possible selvin removal before discharge.  Patient updated on care plan.    Problem: Patient Centered Care  Goal: Patient preferences are identified and integrated in the patient's plan of care  Description: Interventions:  - What would you like us to know as we care for you? I live at home with my 2 sons.  - Provide timely, complete, and accurate information to patient/family  - Incorporate patient and family knowledge, values, beliefs, and cultural backgrounds into the planning and delivery of care  - Encourage patient/family to participate in care and decision-making at the level they choose  - Honor patient and family perspectives and choices  Outcome: Progressing     Problem: Patient/Family Goals  Goal: Patient/Family Long Term Goal  Description: Patient's Long Term Goal: Discharge home    Interventions:  - Manage pain after surgery  - Demonstrate proper care of Pacheco catheter  - Regain bowel function  - See additional Care Plan goals for specific interventions  Outcome: Progressing  Goal: Patient/Family Short Term Goal  Description: Patient's Short Term Goal: Manage pain after surgery    Interventions:   - Transition from IV to oral pain medication  - Take periods of rest after periods of activity  - Splint abdomen with pillow before position changes  - See additional Care Plan goals for specific interventions  Outcome: Progressing     Problem: Diabetes/Glucose Control  Goal: Glucose maintained within prescribed range  Description: INTERVENTIONS:  - Monitor Blood Glucose as ordered  - Assess for signs and symptoms of hyperglycemia and hypoglycemia  - Administer ordered medications to maintain glucose within target range  - Assess barriers to adequate nutritional intake and  initiate nutrition consult as needed  - Instruct patient on self management of diabetes  Outcome: Progressing     Problem: PAIN - ADULT  Goal: Verbalizes/displays adequate comfort level or patient's stated pain goal  Description: INTERVENTIONS:  - Encourage pt to monitor pain and request assistance  - Assess pain using appropriate pain scale  - Administer analgesics based on type and severity of pain and evaluate response  - Implement non-pharmacological measures as appropriate and evaluate response  - Consider cultural and social influences on pain and pain management  - Manage/alleviate anxiety  - Utilize distraction and/or relaxation techniques  - Monitor for opioid side effects  - Notify MD/LIP if interventions unsuccessful or patient reports new pain  - Anticipate increased pain with activity and pre-medicate as appropriate  Outcome: Progressing     Problem: RISK FOR INFECTION - ADULT  Goal: Absence of fever/infection during anticipated neutropenic period  Description: INTERVENTIONS  - Monitor WBC  - Administer growth factors as ordered  - Implement neutropenic guidelines  Outcome: Progressing     Problem: SAFETY ADULT - FALL  Goal: Free from fall injury  Description: INTERVENTIONS:  - Assess pt frequently for physical needs  - Identify cognitive and physical deficits and behaviors that affect risk of falls.  - [place] fall precautions as indicated by assessment.  - Educate pt/family on patient safety including physical limitations  - Instruct pt to call for assistance with activity based on assessment  - Modify environment to reduce risk of injury  - Provide assistive devices as appropriate  - Consider OT/PT consult to assist with strengthening/mobility  - Encourage toileting schedule  Outcome: Progressing     Problem: DISCHARGE PLANNING  Goal: Discharge to home or other facility with appropriate resources  Description: INTERVENTIONS:  - Identify barriers to discharge w/pt and caregiver  - Include  patient/family/discharge partner in discharge planning  - Arrange for needed discharge resources and transportation as appropriate  - Identify discharge learning needs (meds, wound care, etc)  - Arrange for interpreters to assist at discharge as needed  - Consider post-discharge preferences of patient/family/discharge partner  - Complete POLST form as appropriate  - Assess patient's ability to be responsible for managing their own health  - Refer to Case Management Department for coordinating discharge planning if the patient needs post-hospital services based on physician/LIP order or complex needs related to functional status, cognitive ability or social support system  Outcome: Progressing     Problem: GENITOURINARY - ADULT  Goal: Absence of urinary retention  Description: INTERVENTIONS:  - Assess patient’s ability to void and empty bladder  - Monitor intake/output and perform bladder scan as needed  - Follow urinary retention protocol/standard of care  - Consider collaborating with pharmacy to review patient's medication profile  - Implement strategies to promote bladder emptying  Outcome: Progressing     Problem: GASTROINTESTINAL - ADULT  Goal: Minimal or absence of nausea and vomiting  Description: INTERVENTIONS:  - Maintain adequate hydration with IV or PO as ordered and tolerated  - Nasogastric tube to low intermittent suction as ordered  - Evaluate effectiveness of ordered antiemetic medications  - Provide nonpharmacologic comfort measures as appropriate  - Advance diet as tolerated, if ordered  - Obtain nutritional consult as needed  - Evaluate fluid balance  Outcome: Progressing  Goal: Maintains or returns to baseline bowel function  Description: INTERVENTIONS:  - Assess bowel function  - Maintain adequate hydration with IV or PO as ordered and tolerated  - Evaluate effectiveness of GI medications  - Encourage mobilization and activity  - Obtain nutritional consult as needed  - Establish a toileting  routine/schedule  - Consider collaborating with pharmacy to review patient's medication profile  Outcome: Progressing

## 2024-06-28 NOTE — DISCHARGE SUMMARY
[facility]  part of [facility]    DISCHARGE SUMMARY     Toya Mcdaniel Patient Status:  Inpatient    1945 MRN [id number]   Location [facility] 4W/SW/SE Attending Bernie Hull MD   Hosp Day # 4 PCP William Panchal MD     Date of Admission: 2024  Date of Discharge:  2024    Discharge Disposition: Home or Self Care    Discharge Diagnosis:     Right renal pelvis high-grade urothelial carcinoma - s/p robot-assisted laparoscopic right Acute blood loss anemia  Diabetes mellitus type 2  Essential hypertension   Hypotension  Dehydration  CKD 3  Hyperlipidemia    History of Present Illness:     The patient is a 78-year-old  female with known diagnosis of right renal pelvis high-grade urothelial carcinoma with no evidence of distant metastasis, was evaluated by Urology and referred to Uro-oncology. Scheduled today for above-mentioned procedure by Dr. Adriane Torres. Postoperatively, transferred to PACU for further monitoring.     Brief Synopsis:     Right renal pelvis high-grade urothelial carcinoma - s/p robot-assisted laparoscopic right nephroureterectomy  Pain control adequate   Cont Monitor hemodynamic status  Urology on consult - Full pathology report still pending  Drain to be removed if drain Cr low. Quiles to remain in place at discharge. Close opt follow up  HH follow up as well. ASA to be held at discharge until patient follows up with urology as opt       Acute blood loss anemia  Due to post-operative status  s/p 2U pRBC, Hb this AM 10.4  Monitor H&H, transfuse as indicated  Monitor vitals     Essential hypertension   Hypotension  Dehydration  BP improved. Hold lisinopril at discharge  Continue metoprolol and imdur     CKD 3  Hold lisinopril and monitor kidney function  Monitor renal function close as opt with PCP       Patient is to remain compliant with all discharge medications and instructions and to follow up as advised.   Patient encouraged to make  healthy lifestyle and dietary changes.    Lace+ Score: 51  59-90 High Risk  29-58 Medium Risk  0-28   Low Risk       TCM Follow-Up Recommendation:  LACE 29-58: Moderate Risk of readmission after discharge from the hospital.      Procedures during hospitalization:   robot-assisted laparoscopic right nephroureterectomy    Incidental or significant findings and recommendations (brief descriptions):  None    Lab/Test results pending at Discharge:   None    Consultants:  Urology    Discharge Medication List:     Discharge Medications        START taking these medications        Instructions Prescription details   HYDROcodone-acetaminophen 5-325 MG Tabs  Commonly known as: Norco      Take 1-2 tablets by mouth every 4 (four) hours as needed for Pain.   Quantity: 15 tablet  Refills: 0            CONTINUE taking these medications        Instructions Prescription details   acetaminophen 500 MG Tabs  Commonly known as: Tylenol Extra Strength      Take 1 tablet (500 mg total) by mouth every 6 (six) hours as needed for Pain.   Refills: 0     atorvastatin 10 MG Tabs  Commonly known as: Lipitor      TAKE 1 TABLET(10 MG) BY MOUTH EVERY NIGHT   Quantity: 90 tablet  Refills: 3     cholecalciferol 50 MCG (2000 UT) Caps  Commonly known as: Vitamin D3      Take 1 capsule (2,000 Units total) by mouth daily.   Refills: 0     isosorbide mononitrate ER 30 MG Tb24  Commonly known as: Imdur      Take 1 tablet (30 mg total) by mouth daily.   Refills: 0     latanoprost 0.005 % Soln  Commonly known as: Xalatan      Place into both eyes nightly.   Refills: 0     metFORMIN HCl 1000 MG Tabs  Commonly known as: GLUCOPHAGE      Take 1 tablet (1,000 mg total) by mouth 2 (two) times daily with meals.   Quantity: 180 tablet  Refills: 0     metoprolol tartrate 25 MG Tabs  Commonly known as: Lopressor      Take 1 tablet (25 mg total) by mouth 2 (two) times daily.   Quantity: 180 tablet  Refills: 3     pantoprazole 40 MG Tbec  Commonly known as: Protonix       TAKE 1 TABLET(40 MG) BY MOUTH EVERY MORNING BEFORE BREAKFAST   Quantity: 90 tablet  Refills: 0            STOP taking these medications      aspirin 81 MG Chew        lisinopril 20 MG Tabs  Commonly known as: Prinivil; Zestril                  Where to Get Your Medications        These medications were sent to [id number] - [place], [place] - [address] AT [facility], [phone], [phone]  [address] [facility] [address]      Phone: [phone]   HYDROcodone-acetaminophen 5-325 MG Tabs         [place]    Follow-up appointment:   Adriane Torres MD  [address] [id number] [phone]    Follow up  Follow-up with Dr. Torres or Dr. Chairez in one week. Please call for appointment.    William Panchal MD  [address] [facility] [id number] [phone]    Follow up in 1 week(s)      Appointments for Next 30 Days 6/28/2024 - 7/28/2024        Date Arrival Time Visit Type Length Department Provider     7/9/2024  1:30 PM  LAB - [facility] [[id number]] 15 min [facility] - Infusion EM CC LAB2    Patient Instructions:         Location Instructions:     Your appointment is at the [facility]. The address is [address] [place] [address]. The entrance is located on the East side of the [facility].  Masks are optional for all patients and visitors, unless otherwise indicated. No care partners/visitors under 18 years of age are allowed in the infusion room.               7/9/2024  2:30 PM  FOLLOW UP-HEM/ONC [[id number]] 30 min [facility] Hematology Oncology Graeme Esquivel DO    Patient Instructions:         Location Instructions:     Your appointment is at the [facility]. The address is [address] [place] [address]. The entrance is located on the East side of the [facility].  Masks are optional for all patients and visitors, unless otherwise indicated.                       Vital signs:  Temp:  [97.9 °F (36.6 °C)] 97.9 °F (36.6 °C)  Pulse:  [68-71] 68  Resp:  [18-20] 20  BP: (123-151)/(46-58) 128/55  SpO2:  [97 %-100 %] 97 %    Physical Exam:    Gen: NAD AO x3  Chest: good air entry CTABL  CVS: normal s1 and s2 RR  Abd: NABS soft NT ND  Neuro: CN 2-12 grossly intact  Ext: no edema in bilateral LE    -----------------------------------------------------------------------------------------------  PATIENT DISCHARGE INSTRUCTIONS: See electronic chart    Bernie Hull MD  Hospitalist    Time spent:  > 30 minutes    The 21st Century Cures Act makes medical notes like these available to patients in the interest of transparency. Please be advised this is a medical document. Medical documents are intended to carry relevant information, facts as evident, and the clinical opinion of the practitioner. The medical note is intended as peer to peer communication and may appear blunt or direct. It is written in medical language and may contain abbreviations or verbiage that are unfamiliar.

## 2024-06-28 NOTE — CM/SW NOTE
SW met w/ pt at bedside - pt up in chair and eating breakfast. Pt confirmed plan for DC home later today.    Discussed HH and list provided yesterday. Pt stating she has not reviewed the list yet. SW requested pt review list and call SW at phone # provided w/ her final choice before she discharges today. Pt expressed understanding and is agreeable.      PLAN: Home w/ HH - pending choice & med clear        SW/CM to remain available for support and/or discharge planning.            Lakesha Fleming, MSW, LSW [id number]

## 2024-06-28 NOTE — PLAN OF CARE
Patient's pain covered by scheduled tylenol. Quiles to gravity, no irrigation needed. MICHAEL drain to bulb suction. Ambulating hallways independently with walker. Tolerating diet, no nausea. Educated patient on irrigation of Catheter prn, return demonstration performed by Patient. Verbalized understanding on all Quiles care instruction. Leg bag placed, sent with large catheter bag as well as all needed supplies. MICHAEL drain removed by Dr. Chairez prior to discharge. Discharge papers given to patient, verbalized understanding of all instructions. Safety plan in place, calling appropriately for assistance.     Problem: Patient/Family Goals  Goal: Patient/Family Short Term Goal  Description: Patient's Short Term Goal: Manage pain after surgery    Interventions:   - Transition from IV to oral pain medication  - Take periods of rest after periods of activity  - Splint abdomen with pillow before position changes  - See additional Care Plan goals for specific interventions  Outcome: Progressing     Problem: Diabetes/Glucose Control  Goal: Glucose maintained within prescribed range  Description: INTERVENTIONS:  - Monitor Blood Glucose as ordered  - Assess for signs and symptoms of hyperglycemia and hypoglycemia  - Administer ordered medications to maintain glucose within target range  - Assess barriers to adequate nutritional intake and initiate nutrition consult as needed  - Instruct patient on self management of diabetes  Outcome: Progressing     Problem: PAIN - ADULT  Goal: Verbalizes/displays adequate comfort level or patient's stated pain goal  Description: INTERVENTIONS:  - Encourage pt to monitor pain and request assistance  - Assess pain using appropriate pain scale  - Administer analgesics based on type and severity of pain and evaluate response  - Implement non-pharmacological measures as appropriate and evaluate response  - Consider cultural and social influences on pain and pain management  - Manage/alleviate anxiety  -  Utilize distraction and/or relaxation techniques  - Monitor for opioid side effects  - Notify MD/LIP if interventions unsuccessful or patient reports new pain  - Anticipate increased pain with activity and pre-medicate as appropriate  Outcome: Progressing     Problem: RISK FOR INFECTION - ADULT  Goal: Absence of fever/infection during anticipated neutropenic period  Description: INTERVENTIONS  - Monitor WBC  - Administer growth factors as ordered  - Implement neutropenic guidelines  Outcome: Progressing     Problem: SAFETY ADULT - FALL  Goal: Free from fall injury  Description: INTERVENTIONS:  - Assess pt frequently for physical needs  - Identify cognitive and physical deficits and behaviors that affect risk of falls.  - [place] fall precautions as indicated by assessment.  - Educate pt/family on patient safety including physical limitations  - Instruct pt to call for assistance with activity based on assessment  - Modify environment to reduce risk of injury  - Provide assistive devices as appropriate  - Consider OT/PT consult to assist with strengthening/mobility  - Encourage toileting schedule  Outcome: Progressing     Problem: GENITOURINARY - ADULT  Goal: Absence of urinary retention  Description: INTERVENTIONS:  - Assess patient’s ability to void and empty bladder  - Monitor intake/output and perform bladder scan as needed  - Follow urinary retention protocol/standard of care  - Consider collaborating with pharmacy to review patient's medication profile  - Implement strategies to promote bladder emptying  Outcome: Progressing

## 2024-06-28 NOTE — CM/SW NOTE
06/28/24 1100   Discharge disposition   Expected discharge disposition Home-Health   Post Acute Care Provider   ([facility])   Discharge transportation Private car       Per chart, pt has DC Order for today. NAOMY Burch confirmed they are waiting on urology to confirm if drain will be pulled or not.    RN informed SW that pt has chosen [facility] for services upon DC.    [facility] reserved in [facility] and informed of pt's plan for DC today. HH instructions added to pt's AVS.    Pt is cleared from SW/CM stand point.      PLAN: Home w/ [facility]           Lakesha Fleming, MSW, LSW [phone]

## 2024-06-28 NOTE — PLAN OF CARE
Patient is alert and orientated x4. RA. Tele in place with no calls. MICHAEL drain with good out put. Quiles draining. No complaints of pain. Call light with in reach. All safety measures in place.  Problem: Patient Centered Care  Goal: Patient preferences are identified and integrated in the patient's plan of care  Description: Interventions:  - What would you like us to know as we care for you? I live at home with my 2 sons.  - Provide timely, complete, and accurate information to patient/family  - Incorporate patient and family knowledge, values, beliefs, and cultural backgrounds into the planning and delivery of care  - Encourage patient/family to participate in care and decision-making at the level they choose  - Honor patient and family perspectives and choices  Outcome: Progressing     Problem: Patient/Family Goals  Goal: Patient/Family Long Term Goal  Description: Patient's Long Term Goal: Discharge home    Interventions:  - Manage pain after surgery  - Demonstrate proper care of Quiles catheter  - Regain bowel function  - See additional Care Plan goals for specific interventions  Outcome: Progressing  Goal: Patient/Family Short Term Goal  Description: Patient's Short Term Goal: Manage pain after surgery    Interventions:   - Transition from IV to oral pain medication  - Take periods of rest after periods of activity  - Splint abdomen with pillow before position changes  - See additional Care Plan goals for specific interventions  Outcome: Progressing     Problem: Diabetes/Glucose Control  Goal: Glucose maintained within prescribed range  Description: INTERVENTIONS:  - Monitor Blood Glucose as ordered  - Assess for signs and symptoms of hyperglycemia and hypoglycemia  - Administer ordered medications to maintain glucose within target range  - Assess barriers to adequate nutritional intake and initiate nutrition consult as needed  - Instruct patient on self management of diabetes  Outcome: Progressing     Problem:  PAIN - ADULT  Goal: Verbalizes/displays adequate comfort level or patient's stated pain goal  Description: INTERVENTIONS:  - Encourage pt to monitor pain and request assistance  - Assess pain using appropriate pain scale  - Administer analgesics based on type and severity of pain and evaluate response  - Implement non-pharmacological measures as appropriate and evaluate response  - Consider cultural and social influences on pain and pain management  - Manage/alleviate anxiety  - Utilize distraction and/or relaxation techniques  - Monitor for opioid side effects  - Notify MD/LIP if interventions unsuccessful or patient reports new pain  - Anticipate increased pain with activity and pre-medicate as appropriate  Outcome: Progressing     Problem: RISK FOR INFECTION - ADULT  Goal: Absence of fever/infection during anticipated neutropenic period  Description: INTERVENTIONS  - Monitor WBC  - Administer growth factors as ordered  - Implement neutropenic guidelines  Outcome: Progressing     Problem: SAFETY ADULT - FALL  Goal: Free from fall injury  Description: INTERVENTIONS:  - Assess pt frequently for physical needs  - Identify cognitive and physical deficits and behaviors that affect risk of falls.  - [place] fall precautions as indicated by assessment.  - Educate pt/family on patient safety including physical limitations  - Instruct pt to call for assistance with activity based on assessment  - Modify environment to reduce risk of injury  - Provide assistive devices as appropriate  - Consider OT/PT consult to assist with strengthening/mobility  - Encourage toileting schedule  Outcome: Progressing     Problem: DISCHARGE PLANNING  Goal: Discharge to home or other facility with appropriate resources  Description: INTERVENTIONS:  - Identify barriers to discharge w/pt and caregiver  - Include patient/family/discharge partner in discharge planning  - Arrange for needed discharge resources and transportation as appropriate  -  Identify discharge learning needs (meds, wound care, etc)  - Arrange for interpreters to assist at discharge as needed  - Consider post-discharge preferences of patient/family/discharge partner  - Complete POLST form as appropriate  - Assess patient's ability to be responsible for managing their own health  - Refer to Case Management Department for coordinating discharge planning if the patient needs post-hospital services based on physician/LIP order or complex needs related to functional status, cognitive ability or social support system  Outcome: Progressing     Problem: GENITOURINARY - ADULT  Goal: Absence of urinary retention  Description: INTERVENTIONS:  - Assess patient’s ability to void and empty bladder  - Monitor intake/output and perform bladder scan as needed  - Follow urinary retention protocol/standard of care  - Consider collaborating with pharmacy to review patient's medication profile  - Implement strategies to promote bladder emptying  Outcome: Progressing     Problem: GASTROINTESTINAL - ADULT  Goal: Minimal or absence of nausea and vomiting  Description: INTERVENTIONS:  - Maintain adequate hydration with IV or PO as ordered and tolerated  - Nasogastric tube to low intermittent suction as ordered  - Evaluate effectiveness of ordered antiemetic medications  - Provide nonpharmacologic comfort measures as appropriate  - Advance diet as tolerated, if ordered  - Obtain nutritional consult as needed  - Evaluate fluid balance  Outcome: Progressing  Goal: Maintains or returns to baseline bowel function  Description: INTERVENTIONS:  - Assess bowel function  - Maintain adequate hydration with IV or PO as ordered and tolerated  - Evaluate effectiveness of GI medications  - Encourage mobilization and activity  - Obtain nutritional consult as needed  - Establish a toileting routine/schedule  - Consider collaborating with pharmacy to review patient's medication profile  Outcome: Progressing

## 2024-06-28 NOTE — DISCHARGE INSTRUCTIONS
-----Do Not take Aspirin until Urologist sees you in 1 week.     Instructions For Care Following Nephroureterectomy    The healing process takes time and we would like for you to observe the following instructions during your initial recovery at home.     Activity Restrictions  It is expected that you will resume regular activity around your home when you are discharged from the hospital. Gradually increase the amount of walking you do each day as tolerated. Do not walk to the point of exhaustion. You can resume your daily activities as you see fit. In general, patients are back to normal activities within 10 days and full activity in 21-28 days.    Do not drive any motorized vehicle until you are off narcotic pain medication. If traveling by car, be sure to stop every 1 to 2 hours. Get out of the car and walk around. Do not sign legal documents while taking narcotic pain medication. The narcotic medication may cause alteration in visual perception and impair judgment.    Showering/Bathing  You may begin showering 24 hours after surgery. You may not take a bath until your incisions are completely healed. There will be white tape strips called Steri-strips on the incision that will fall off during a shower. Do not pull these off.     Diet  Return to normal eating habits; although small meals are better tolerated at first. Allow your appetite to determine how much you eat; do not force food if you feel full or if your stomach is unsettled. In the first week after surgery, it may be best to avoid spicy or fatty foods.    Walking  Walking soon after surgery encourages early return of bowel function, promotes effective breathing, mobilizes secretions from the lungs, improves circulation, prevents stiff joints and relieves pressure. The morning after surgery, you will be instructed to be out of bed at least 6 times a day. This can be thought of as twice after breakfast, twice after lunch, and twice after dinner. Being out  of bed more often is encouraged but must be at least 6 times a day. After you are discharged from the hospital it is very important to continue with the minimum of walking 6 times a day.    Support kidney function:   Manage other health conditions such as diabetes, high blood pressure, or heart disease. These conditions stress your kidneys.    Talk to your healthcare provider before you take over-the-counter-medicine. NSAIDs (includes ibuprofen, motrin, Advil, aleve, naproxen, etc.), stomach medicine, or laxatives may harm your kidneys.    Limit alcohol. Ask how much alcohol is safe for you to drink. A drink of alcohol is 12 ounces of beer, 5 ounces of wine, or 1½ ounces of liquor.   Do not smoke. Nicotine can damage blood vessels and make it more difficult to manage your impaired kidney function. Smoking also harms your kidneys. Do not use e-cigarettes or smokeless tobacco in place of cigarettes or to help you quit. They still contain nicotine. Ask your healthcare provider for information if you currently smoke and need help quitting.    REASONS TO CALL YOUR UROLOGIST WITHOUT DELAY  Any signs of pulmonary embolus (blood clot from pelvis which has gotten into the blood circulation of the lung):   Chest pain   Difficulty breathing   Sensation of heart racing  THE ABOVE SYMPTOMS REQUIRE IMMEDIATE ATTENTION AND IF YOU ARE UNABLE TO REACH YOUR UROLOGIST OR UROLOGIST ON CALL, YOU SHOULD GO TO A NEARBY EMERGENCY ROOM   Signs of a blood clot in the legs or pelvis (Deep Vein Thrombosis)   Pain in the back of the thigh, calf, or groin.   Swelling of the leg.   Red streaking color or warmth of the leg.   Problems with the surgical incision   Redness and/or warmth around incision.   Pus draining from the incision.   Separation of the skin at the incision line.   Other   Fever with temperature by mouth greater than 101 F.   Nausea, vomiting or severe abdominal bloating.   Pain not relieved by prescribed medications.   Inability  to urinate.     For urgent and emergent situations 24 hours a day, page Dr. Torres/Mihaela (or one of their covering partners) by calling [phone].  If for some reason you cannot wait for a call back, go to the nearest Emergency Room.         Home Health:  Sometimes managing your health at home requires assistance.  The [facility] team has recognized your preference to use [facility]  A representative from the home health agency will contact you or your family to schedule your first visit.      [facility]  [address] [place] [address]  Phone: [phone]  Fax: [phone]

## 2024-06-28 NOTE — PROGRESS NOTES
[facility] ADULT PROGRESS NOTE    SUBJECTIVE  Patient seen and examined, chart reviewed. No acute events overnight. Had pain yesterday when her pacheco stopped draining. Pain much improved today. Tolerating her diet. Passing flatus and had a BM. No nausea, vomiting. No chest pain, SOB. Still feels weak after surgery.       OBJECTIVE    Vital signs:   Vitals:    06/27/24 1346 06/27/24 1609 06/27/24 2101 06/28/24 0519   BP: 137/58 151/52 123/46 128/55   BP Location: Right arm Right arm Right arm Right arm   Pulse: 71 70 68    Resp: 18 18 18 20   Temp: 97.9 °F (36.6 °C)  97.9 °F (36.6 °C) 97.9 °F (36.6 °C)   TempSrc: Oral   Oral   SpO2: 98% 98% 100% 97%   Weight:       Height:           IOs:  06/26 1900 - 06/28 0659  In: 240 [P.O.:240]  Out: 3725 [Urine:2660; Drains:1065]    Physical examination:  Constitutional: General appearance: appears well, alert and oriented x 3, pleasant and cooperative.  HEENT: Neck supple  Chest: Normal respiratory effort  CV: Normal perfusion  Abdomen: Soft without tenderness, guarding. Incisions well approximated.   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      S/p 2 U PRBC 6/25.  Hgb stable. Continue HSQ     Trending creatinine.  Likely to find new baseline in mononephric state.       Drain output increased. Has fluctuated throughout her admission. We will recheck a drain creatinine. If low, will remove the drain prior to discharge.     Has had issues with poor drainage of the pacheco. Possibly due to a clot within the bladder. Has responded well to intermittent flushing. Will teach the patient how to flush the catheter prior to discharge.      PRN pain control    General diet      OOB, in chair, and ambulation    Possible discharge home today     Appreciate Dr. Hull's assistance in caring for this patient.         Marv Chairez DO  [facility]  Office [phone]

## 2024-07-01 ENCOUNTER — PATIENT OUTREACH (OUTPATIENT)
Dept: CASE MANAGEMENT | Age: 79
End: 2024-07-01

## 2024-07-01 DIAGNOSIS — Z02.9 ENCOUNTERS FOR UNSPECIFIED ADMINISTRATIVE PURPOSE: ICD-10-CM

## 2024-07-01 DIAGNOSIS — C64.1 UROTHELIAL CARCINOMA OF KIDNEY, RIGHT (HCC): ICD-10-CM

## 2024-07-01 DIAGNOSIS — C68.9 UROTHELIAL CARCINOMA (HCC): Primary | ICD-10-CM

## 2024-07-01 PROCEDURE — 1159F MED LIST DOCD IN RCRD: CPT

## 2024-07-01 PROCEDURE — 1125F AMNT PAIN NOTED PAIN PRSNT: CPT

## 2024-07-01 PROCEDURE — 1111F DSCHRG MED/CURRENT MED MERGE: CPT

## 2024-07-01 NOTE — PROGRESS NOTES
Attempted to reach the patient to complete a Transitional Care Management-Hospital follow up call. Left a message for the patient to call the nurse care manager back at, [phone].
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
## 2024-07-02 ENCOUNTER — NURSE TRIAGE (OUTPATIENT)
Dept: INTERNAL MEDICINE CLINIC | Facility: CLINIC | Age: 79
End: 2024-07-02

## 2024-07-02 NOTE — TELEPHONE ENCOUNTER
Dr. Abdulkadir Odom'd scheduling appointment for 7/3/24 unless the patient develops leg pain or shortness of breath,

## 2024-07-02 NOTE — TELEPHONE ENCOUNTER
Spoke with Toya stated noticed swelling of mild  left  foot on 6/28/24 as soon as discharged. Patient denied calf or ankle pain, shortness of breath or chest pain. Patient reported digits are warm and has good sensation.      Disposition: Seen today but no appointments available.    Message sent to Dr. Panchal inquiry if this patient needs to go to the ED?    Reason for Disposition   Thigh, calf, or ankle swelling and only 1 side    Answer Assessment - Initial Assessment Questions  1. LOCATION: \"Which ankle is swollen?\" \"Where is the swelling?\"      Left foot and ankle swelling  2. ONSET: \"When did the swelling start?\"      6/28/24  3. SWELLING: \"How bad is the swelling?\" Or, \"How large is it?\" (e.g., mild, moderate, severe; size of localized swelling)     - NONE: No joint swelling.    - LOCALIZED: Localized; small area of puffy or swollen skin (e.g., insect bite, skin irritation).    - MILD: Joint looks or feels mildly swollen or puffy.    - MODERATE: Swollen; interferes with normal activities (e.g., work or school); decreased range of movement; may be limping.    - SEVERE: Very swollen; can't move swollen joint at all; limping a lot or unable to walk.      Mild  4. PAIN: \"Is there any pain?\" If Yes, ask: \"How bad is it?\" (Scale 1-10; or mild, moderate, severe)    - NONE (0): no pain.    - MILD (1-3): doesn't interfere with normal activities.     - MODERATE (4-7): interferes with normal activities (e.g., work or school) or awakens from sleep, limping.     - SEVERE (8-10): excruciating pain, unable to do any normal activities, unable to walk.       0/10  5. CAUSE: \"What do you think caused the ankle swelling?\"      Unknown   6. OTHER SYMPTOMS: \"Do you have any other symptoms?\" (e.g., fever, chest pain, difficulty breathing, calf pain)      Denied  7. PREGNANCY: \"Is there any chance you are pregnant?\" \"When was your last menstrual period?\"      N/A    Protocols used: Ankle Swelling-A-OH

## 2024-07-02 NOTE — PROGRESS NOTES
Initial Post Discharge Follow Up   Discharge Date: 6/28/24  Contact Date: 7/2/2024    Consent Verification:  Assessment Completed With: Patient  HIPAA Verified?  Yes    Discharge Dx:   Right renal pelvis high-grade urothelial carcinoma - s/p robot-assisted laparoscopic right nephroureterectomy.    Acute blood loss anemia  Diabetes mellitus type 2  Essential hypertension   Hypotension  Dehydration  CKD 3  Hyperlipidemia    General:   How have you been since your discharge from the hospital? The patient did admit to continued abdominal soreness rated 4-7/10 for intensity. The patient reported surgical incisions have been healing well at home. The patient denies any redness, pain, edema, discharge/bleeding, foul odor or heat coming from the incision sites. The incisions were described as intact and soft to the touch. The patient also denies any malaise or fever. The patient did express concern for edema in the left foot. The patient reported left foot edema began the day of discharge. The patient denies any redness, tenderness/pain, or warmth in the left leg. The patient reported elevation at home has not improved the left foot edema.The patient denies any edema in the right foot or right calf edema, pain, warmth, redness or tenderness. The patient confirmed the home health nurse has also evaluated the foot. Nurse care manager recommended RICE therapy (elevation and compression). The patient verbalized understanding. The patient denies any edema in the right foot. The patient denies any chest pain, trouble breathing, coughing, dizziness/syncope, irregular heart beat, or sweats. The patient also denies any hematuria, melena, hematochezia, or bleeding from the gums, nose or cuts. The patient denies any increased pain around the MICHAEL drain tube, redness/edema/warmth from the incision site, foul smelling drainage fluid leaking around the tube, tube damage, new red drainage/blood clots, or a sudden increase/decrease in  drainage. The patient did mention the MICHAEL drain was clogged on 6/28/2024. The patient contacted urology and this was resolved. The patient denies any leaking around the pacheco catheter insertion site, burning (urinary tract or genital area), nausea/vomiting, hematuria, cloudy urine, or malodorous urine.  The patient denies extreme fatigue/lethargy, headache, or difficulty concentrating. The patient reported glucose has been well controlled at home with fasting readings today (7/2/2024) and yesterday (7/1/2024) at 107. The patient reported a blood pressure reading of 127/62 yesterday with the home health nurse.   Do you have any pain since discharge?  Yes  Where: abdominal    Rating on pain scale 1-10, 10 being the worst pain you have ever experienced, what is current pain: 4  Is the pain manageable at home? Yes  How well was your pain managed while in the hospital?   On a scale of 1-5   1- Very Poor and 5- Very well   Very Well  When you were leaving the hospital were your discharge instructions reviewed with you? Yes  How well were your discharge instructions explained to you?   On a scale of 1-5   1- Very Poor and 5- Very well   Very Well  Do you have any questions about your discharge instructions?  No  Before leaving the hospital was your diagnoses explained to you? Yes  Do you have any questions about your diagnoses? No  Are you able to perform normal daily activities of living as you have prior to your hospital stay (dressing, bathing, ambulating to the bathroom, etc)? yes The patient is ambulating at home independently or with a cane. NCM did review/stress the importance of fall precautions.   (NCM) Was patient given a different diet per AVS? no      Medications: NCM did confirm the patient has discontinued the following as directed:  STOP taking these medications       aspirin 81 MG Chew         lisinopril 20 MG Tabs  Commonly known as: Prinivil; Zestril        Current Outpatient Medications   Medication Sig  Dispense Refill    HYDROcodone-acetaminophen 5-325 MG Oral Tab Take 1-2 tablets by mouth every 4 (four) hours as needed for Pain. 15 tablet 0    metoprolol tartrate 25 MG Oral Tab Take 1 tablet (25 mg total) by mouth 2 (two) times daily. 180 tablet 3    metFORMIN HCl 1000 MG Oral Tab Take 1 tablet (1,000 mg total) by mouth 2 (two) times daily with meals. 180 tablet 0    ATORVASTATIN 10 MG Oral Tab TAKE 1 TABLET(10 MG) BY MOUTH EVERY NIGHT 90 tablet 3    isosorbide mononitrate ER 30 MG Oral Tablet 24 Hr Take 1 tablet (30 mg total) by mouth daily.      latanoprost 0.005 % Ophthalmic Solution Place into both eyes nightly.      PANTOPRAZOLE 40 MG Oral Tab EC TAKE 1 TABLET(40 MG) BY MOUTH EVERY MORNING BEFORE BREAKFAST 90 tablet 0    acetaminophen 500 MG Oral Tab Take 1 tablet (500 mg total) by mouth every 6 (six) hours as needed for Pain.      cholecalciferol 50 MCG (2000 UT) Oral Cap Take 1 capsule (2,000 Units total) by mouth daily.       Were there any changes to your current medication(s) noted on the AVS? Yes  If so, were these medication changes discussed with you prior to leaving the hospital? Yes  If a new medication was prescribed:    Was the new medication's purpose & side effects reviewed? Yes  Do you have any questions about your new medication? No  Did you  your discharge medications when you left the hospital? Yes  Let's go over your medications together to make sure we are not missing anything. Medications Reviewed  Are there any reasons that keep you from taking your medication as prescribed? No  Are you having any concerns with constipation? No      Discharge medications reviewed/discussed/and reconciled against outpatient medications with patient.  Any changes or updates to medications sent to PCP.  Patient Acknowledged     Referrals/orders at D/C:  Referrals/orders placed at D/C? yes  What services:   Home health   (If HH was ordered) Has HH been set up?  Yes    If Yes: With Whom: [facility]   Except for Home Health Services mentioned above, have you scheduled these other services? Yes The patient is scheduled for labs (CBC and CMP) on 7/9/2024.   If yes, have you started these services? no    DME ordered at D/C? Yes  What?  walker incentive spirometer, MICHAEL drain, pacheco   Have you received your (DME)? yes; The patient has continued deep breathing exercises as directed.     Discharge orders, AVS reviewed and discussed with patient. Any changes or updates to orders sent to PCP.  Patient Acknowledged      SDOH:   Transportation:   Transportation Needs: No Transportation Needs (7/2/2024)    Transportation Needs     Lack of Transportation: No     Car Seat: Not on file       Financial Strain:   Financial Resource Strain: Low Risk  (7/2/2024)    Financial Resource Strain     Difficulty of Paying Living Expenses: Not very hard     Med Affordability: No       Follow up appointments:      Your appointments       Date & Time Appointment Department ([place])    Jul 08, 2024 12:20 PM CDT Hospital Follow Up with William Panchal MD [facility] ([facility] at [address] [place] [address])        Jul 09, 2024 1:30 PM CDT Lab Visit with EM CC LAB2 [facility] ([facility])        Jul 09, 2024 2:30 PM CDT HEMATOLOGY ONCOLOGY FOLLOW UP with Graeme Esquivel DO [facility] Hematology Oncology ([facility])        Aug 12, 2024 9:40 AM CDT Exam - Established with William Panchal MD [facility] ([facility] at [address] [place] [address] Hematology Oncology  [facility]  [address] [facility] [address]  [phone] [facility] at [address] [place] [address]  [address] [facility] [id number] [phone] [facility]  [place] [address]  [place] [address]  [phone]            TCC  Was TCC ordered: No    PCP (If no TCC appointment)  Does patient already have a PCP appointment scheduled? Yes; The patient is scheduled with Dr. Panchal on 7/8/2024.   NCM Confirmed PCP office TCM appointment with patient       Specialist    Does the patient have any other follow up appointment(s) needing to be scheduled? Yes  If yes: NCM reviewed upcoming specialist appointment with patient: Yes    The patient is scheduled with Dr. Esquivel on 7/9/2024 and urology on 7/10/2024.   Does the patient need assistance scheduling appointment(s): No    Is there any reason as to why you cannot make your appointment(s)?  No     Needs post D/C:   Now that you are home, are there any needs or concerns you need addressed before your next visit with your PCP?  (DME, meds, questions, etc.): Yes; A telephone encounter was sent to the primary care physician office reporting the patient's left foot edema and instructions provided by the nurse care manager.     Interventions by NCM:    Reviewed all discharge instructions with the patient with a focus on post-op directions/restrictions/wound care, pain management and fall precautions. All medications were reviewed and educated on the importance of taking the medications as directed.  Patient symptoms were assessed, education was completed for signs/symptoms to monitor, and reviewed when to contact providers versus go to the emergency department/call 911. Appointments were reviewed and stressed the importance of a close follow up with providers. A telephone encounter was sent to the primary care provider's office for an appointment request/review. Home Health orders were confirmed and scheduled. Confirmed patient has DME (medical equipment) and understands proper use.  The patient verbalized understanding and will contact the office with any further questions or concerns.       Overall Rating:   How would  you rate the care you received while in the hospital? excellent    CCM referral placed:    Yes    BOOK BY DATE: 7/12/2024

## 2024-07-02 NOTE — TELEPHONE ENCOUNTER
Spoke with Toya Panchal approved appointment for tomorrow. Patient agreed to appointment scheduling 7/3/24 at 9 AM. Patient informed Dr. Panchal strongly advised if she develops pain left ankle/leg pain, chest pain, or shortness of breath do not come to this appointment instead go straight to the ED. Patient agreed and voiced understanding.

## 2024-07-02 NOTE — TELEPHONE ENCOUNTER
Nurse care manager spoke to the patient today for Transitional Care Management. The patient was recently hospitalized for a laparoscopic right nephroureterectomy.     The patient is scheduled with Dr. Panchal for a Transitional Care Management appointment on 7/8/2024.     The patient did express concern for edema in the left foot. The patient reported left foot edema began the day of discharge. The patient denies any redness, tenderness/pain, or warmth in the left leg. The patient reported elevation at home has not improved the left foot edema.    The patient denies any edema in the right foot or right calf edema, pain, warmth, redness or tenderness.     The patient confirmed the home health nurse has also evaluated the foot. Nurse care manager recommended RICE therapy (elevation and compression). The patient verbalized understanding.     Please follow up with the patient and provide any further recommendations. Thank you.

## 2024-07-05 DIAGNOSIS — C68.9 UROTHELIAL CARCINOMA (HCC): Primary | ICD-10-CM

## 2024-07-08 ENCOUNTER — OFFICE VISIT (OUTPATIENT)
Dept: INTERNAL MEDICINE CLINIC | Facility: CLINIC | Age: 79
End: 2024-07-08
Payer: COMMERCIAL

## 2024-07-08 VITALS
HEIGHT: 57 IN | DIASTOLIC BLOOD PRESSURE: 74 MMHG | WEIGHT: 136 LBS | HEART RATE: 74 BPM | OXYGEN SATURATION: 98 % | BODY MASS INDEX: 29.34 KG/M2 | SYSTOLIC BLOOD PRESSURE: 112 MMHG | TEMPERATURE: 98 F

## 2024-07-08 DIAGNOSIS — I50.9 HEART FAILURE, UNSPECIFIED HF CHRONICITY, UNSPECIFIED HEART FAILURE TYPE (HCC): ICD-10-CM

## 2024-07-08 DIAGNOSIS — N18.31 STAGE 3A CHRONIC KIDNEY DISEASE (HCC): Chronic | ICD-10-CM

## 2024-07-08 DIAGNOSIS — D64.9 ANEMIA, UNSPECIFIED TYPE: ICD-10-CM

## 2024-07-08 DIAGNOSIS — I10 ESSENTIAL HYPERTENSION: ICD-10-CM

## 2024-07-08 DIAGNOSIS — F32.5 MAJOR DEPRESSIVE DISORDER WITH SINGLE EPISODE, IN FULL REMISSION (HCC): ICD-10-CM

## 2024-07-08 DIAGNOSIS — Z00.00 HEALTH MAINTENANCE EXAMINATION: ICD-10-CM

## 2024-07-08 DIAGNOSIS — E78.5 HYPERLIPIDEMIA, UNSPECIFIED HYPERLIPIDEMIA TYPE: ICD-10-CM

## 2024-07-08 DIAGNOSIS — E11.3293 TYPE 2 DIABETES MELLITUS WITH BOTH EYES AFFECTED BY MILD NONPROLIFERATIVE RETINOPATHY WITHOUT MACULAR EDEMA, WITHOUT LONG-TERM CURRENT USE OF INSULIN (HCC): ICD-10-CM

## 2024-07-08 DIAGNOSIS — C68.9 UROTHELIAL CARCINOMA (HCC): Primary | ICD-10-CM

## 2024-07-08 DIAGNOSIS — H26.9 CATARACT OF BOTH EYES, UNSPECIFIED CATARACT TYPE: ICD-10-CM

## 2024-07-08 DIAGNOSIS — H40.9 GLAUCOMA OF BOTH EYES, UNSPECIFIED GLAUCOMA TYPE: ICD-10-CM

## 2024-07-08 PROBLEM — N30.00 ACUTE CYSTITIS WITHOUT HEMATURIA: Status: RESOLVED | Noted: 2024-06-10 | Resolved: 2024-07-08

## 2024-07-08 PROBLEM — C64.1 UROTHELIAL CARCINOMA OF KIDNEY, RIGHT (HCC): Status: RESOLVED | Noted: 2024-06-24 | Resolved: 2024-07-08

## 2024-07-08 RX ORDER — HYDROCODONE BITARTRATE AND ACETAMINOPHEN 5; 325 MG/1; MG/1
1-2 TABLET ORAL EVERY 8 HOURS PRN
Qty: 10 TABLET | Refills: 0 | Status: SHIPPED | OUTPATIENT
Start: 2024-07-08

## 2024-07-08 NOTE — ASSESSMENT & PLAN NOTE
Following with cardiology.  Blood pressures are currently controlled.  Continue metoprolol tartrate 25 mg daily.  Aspirin 81 mg daily - currently paused given recent surgery; restart when ok by urology  Has isosorbide as needed.

## 2024-07-08 NOTE — ASSESSMENT & PLAN NOTE
Status post robot-assisted scopic right nephroureterectomy  Following with urology and oncology now.  Pain is improving  Requesting Norco to use only as needed for breakthrough pain-10 tablets provided-no refills.  Continue use Tylenol as needed for pain  Quiles in place draining clear yellow urine  Surgical incisions appear to be clean dry and intact

## 2024-07-08 NOTE — ASSESSMENT & PLAN NOTE
No swelling or pain currently seen of the lower extremity  ER precautions discussed.  Follow-up as needed

## 2024-07-08 NOTE — ASSESSMENT & PLAN NOTE
Diabetes overall controlled.  Continue metformin at 1000 mg twice daily  Need to obtain diabetic eye report  Follow-up in 6 months

## 2024-07-08 NOTE — PATIENT INSTRUCTIONS
PATIENT INSTRUCTIONS    Thank you for seeing me today, it was a pleasure taking care of you.  Please check out at the  and schedule a follow up appointment.  Return in about 6 months (around 1/8/2025) for medicare visit .  Please remember that the anthony period for all appointments is 5 minutes. This is to help maximize the amount of time that we can spend together at our visits.    Cancel your appointment with me in August  Continue current medications  Discuss with urology when to restart aspirin  Monitor swelling in legs   If pain or if with chest pain or difficulty breathing, go to ER   Continue to see urology and oncology     Shaheen,  Dr. Panchal

## 2024-07-09 ENCOUNTER — OFFICE VISIT (OUTPATIENT)
Dept: HEMATOLOGY/ONCOLOGY | Facility: HOSPITAL | Age: 79
End: 2024-07-09
Attending: INTERNAL MEDICINE
Payer: MEDICARE

## 2024-07-09 VITALS
SYSTOLIC BLOOD PRESSURE: 129 MMHG | WEIGHT: 126.81 LBS | BODY MASS INDEX: 27.36 KG/M2 | OXYGEN SATURATION: 99 % | RESPIRATION RATE: 18 BRPM | TEMPERATURE: 99 F | HEIGHT: 57 IN | HEART RATE: 70 BPM | DIASTOLIC BLOOD PRESSURE: 39 MMHG

## 2024-07-09 DIAGNOSIS — C65.1 MALIGNANT NEOPLASM OF RIGHT RENAL PELVIS (HCC): ICD-10-CM

## 2024-07-09 DIAGNOSIS — C68.9 UROTHELIAL CARCINOMA (HCC): ICD-10-CM

## 2024-07-09 DIAGNOSIS — C68.9 UROTHELIAL CARCINOMA (HCC): Primary | ICD-10-CM

## 2024-07-09 DIAGNOSIS — D64.9 ANEMIA, UNSPECIFIED TYPE: ICD-10-CM

## 2024-07-09 LAB
ABSOLUTE BASOPHILS: 90 CELLS/UL (ref 0–200)
ABSOLUTE EOSINOPHILS: 1646 CELLS/UL (ref 15–500)
ABSOLUTE LYMPHOCYTES: 2363 CELLS/UL (ref 850–3900)
ABSOLUTE MONOCYTES: 638 CELLS/UL (ref 200–950)
ABSOLUTE NEUTROPHILS: 6462 CELLS/UL (ref 1500–7800)
ALBUMIN/GLOBULIN RATIO: 1.2 (CALC) (ref 1–2.5)
ALBUMIN: 3.8 G/DL (ref 3.6–5.1)
ALKALINE PHOSPHATASE: 218 U/L (ref 37–153)
ALT: 13 U/L (ref 6–29)
AST: 32 U/L (ref 10–35)
BASOPHILS: 0.8 %
BILIRUBIN, TOTAL: 0.3 MG/DL (ref 0.2–1.2)
BUN/CREATININE RATIO: 16 (CALC) (ref 6–22)
BUN: 23 MG/DL (ref 7–25)
CALCIUM: 9.8 MG/DL (ref 8.6–10.4)
CARBON DIOXIDE: 19 MMOL/L (ref 20–32)
CHLORIDE: 105 MMOL/L (ref 98–110)
CREATININE: 1.4 MG/DL (ref 0.6–1)
EGFR: 39 ML/MIN/1.73M2
EOSINOPHILS: 14.7 %
GLOBULIN: 3.1 G/DL (CALC) (ref 1.9–3.7)
GLUCOSE: 104 MG/DL (ref 65–99)
HEMATOCRIT: 35.9 % (ref 35–45)
HEMOGLOBIN: 11 G/DL (ref 11.7–15.5)
LYMPHOCYTES: 21.1 %
MCH: 28.2 PG (ref 27–33)
MCHC: 30.6 G/DL (ref 32–36)
MCV: 92.1 FL (ref 80–100)
MONOCYTES: 5.7 %
MPV: 8.7 FL (ref 7.5–12.5)
NEUTROPHILS: 57.7 %
PLATELET COUNT: 619 THOUSAND/UL (ref 140–400)
PROTEIN, TOTAL: 6.9 G/DL (ref 6.1–8.1)
RDW: 14.4 % (ref 11–15)
RED BLOOD CELL COUNT: 3.9 MILLION/UL (ref 3.8–5.1)
SODIUM: 140 MMOL/L (ref 135–146)
WHITE BLOOD CELL COUNT: 11.2 THOUSAND/UL (ref 3.8–10.8)

## 2024-07-09 PROCEDURE — 99214 OFFICE O/P EST MOD 30 MIN: CPT | Performed by: STUDENT IN AN ORGANIZED HEALTH CARE EDUCATION/TRAINING PROGRAM

## 2024-07-09 NOTE — PROGRESS NOTES
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  Initially diagnosed with low-grade disease and had been diagnosed and managed by Dr. Marv Chairez since November 2022.  In October 2023 focal concern for the development of high-grade disease.  The patient was initially reluctant to nephroureterectomy, restaging in April 2024 remained negative for metastases and she ultimately went for right nephroureterectomy 6/24/24. She presents for postoperative follow-up.    The patient is progressing nicely since surgery.  Still has a Quiles, is planning to see [facility] tomorrow.    Review of Systems:  Hematology/Oncology ROS performed and negative except as above in HPI    History/Other:   Current Medications:   HYDROcodone-acetaminophen 5-325 MG Oral Tab Take 1-2 tablets by mouth every 8 (eight) hours as needed for Pain. 10 tablet 0    metoprolol tartrate 25 MG Oral Tab Take 1 tablet (25 mg total) by mouth 2 (two) times daily. 180 tablet 3    metFORMIN HCl 1000 MG Oral Tab Take 1 tablet (1,000 mg total) by mouth 2 (two) times daily with meals. 180 tablet 0    ATORVASTATIN 10 MG Oral Tab TAKE 1 TABLET(10 MG) BY MOUTH EVERY NIGHT 90 tablet 3    isosorbide mononitrate ER 30 MG Oral Tablet 24 Hr Take 1 tablet (30 mg total) by mouth daily.      latanoprost 0.005 % Ophthalmic Solution Place into both eyes nightly.      PANTOPRAZOLE 40 MG Oral Tab EC TAKE 1 TABLET(40 MG) BY MOUTH EVERY MORNING BEFORE BREAKFAST 90 tablet 0    acetaminophen 500 MG Oral Tab Take 1 tablet (500 mg total) by mouth every 6 (six) hours as  needed for Pain.      cholecalciferol 50 MCG (2000 UT) Oral Cap Take 1 capsule (2,000 Units total) by mouth daily.         Allergies:   Allergies   Allergen Reactions    Adhesive Tape OTHER (SEE COMMENTS)     Bandage skin turns black/blue, bruising    Gabapentin DIZZINESS, NAUSEA ONLY and UNKNOWN     Does not feel well, per pt       Objective:   Blood pressure 129/39, pulse 70, temperature 98.6 °F (37 °C), temperature source Oral, resp. rate 18, height 1.448 m (4' 9\"), weight 57.5 kg (126 lb 12.8 oz), SpO2 99%, not currently breastfeeding.  Physical Exam:  ECOG PS:: 0  General: A&Ox3, NAD  HEENT: PERRL, OP clear  CV: RRR, no murmurs  Pulm: CTA b/l, normal effort  Abd: soft, ntnd  Extremities: no edema  Neurological: grossly intact    Results:   Labs:  Lab Results   Component Value Date    WBC 11.2 (H) 07/08/2024    HGB 11.0 (L) 07/08/2024    HCT 35.9 07/08/2024     (H) 07/08/2024    CREATSERUM 1.40 (H) 07/08/2024    BUN 23 07/08/2024     07/08/2024    K TNP 07/08/2024     07/08/2024    CO2 19 (L) 07/08/2024     (H) 07/08/2024    CA 9.8 07/08/2024    ALB 3.8 07/08/2024    ALKPHO 218 (H) 07/08/2024    BILT 0.3 07/08/2024    TP 6.9 07/08/2024    AST 32 07/08/2024    ALT 13 07/08/2024    PTT 29.9 06/04/2024    INR 1.01 06/04/2024     08/23/2021    DDIMER 0.37 10/13/2023    ESRML 38 (H) 08/21/2022    MG 1.7 06/28/2024    PHOS 2.9 06/26/2024     Pathology:  Right kidney and ureter; robotic assisted laparoscopic right nephroureterectomy:   Papillary high-grade urothelial carcinoma, invading into the subepithelial tissue and completely excised.  Negative inked surgical resection margin.  Pathological stage classification (pTNM, AJCC 8th edition): pT1.        Electronically signed by Rylie Waller MD on 6/27/2024 at 1826        Synoptic Report     URETER, RENAL PELVIS: Resection   8th Edition - Protocol posted: 9/20/2023URETER, RENAL PELVIS: RESECTION - All Specimens  SPECIMEN    Procedure  Nephroureterectomy   Specimen Laterality  Right   TUMOR   Tumor Site  Renal pelvis   Histologic Type  Papillary urothelial carcinoma, noninvasive   Histologic Grade  High-grade   Tumor Extent  Invades subepithelial connective tissue   Lymphatic and / or Vascular Invasion  Not identified   MARGINS   Margin Status for Invasive Carcinoma  All margins negative for invasive carcinoma   Margin Status for Carcinoma in Situ / Noninvasive Papillary Urothelial Carcinoma  All margins negative for carcinoma in situ / noninvasive papillary urothelial carcinoma   REGIONAL LYMPH NODES   Regional Lymph Node Status  Not applicable (no regional lymph nodes submitted or found)   pTNM CLASSIFICATION (AJCC 8th Edition)   Reporting of pT, pN, and (when applicable) pM categories is based on information available to the pathologist at the time the report is issued. As per the AJCC (Chapter 1, 8th Ed.) it is the managing physician’s responsibility to establish the final pathologic stage based upon all pertinent information, including but potentially not limited to this pathology report.   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  -underwent R nephroureterectomy 6/24/24, progressing well  -reviewed pathology report, high grade tumor invasion into subepithelial  connective tissue, no muscle involvement, LVI, negative margins  -discussed excellent prognosis for pT1 UTUC s/p surgical extirpation, no indication for adjuvant chemotherapy or immunotherapy given pT1 disease  -we will enter surveillance, can consider CT urogram q6 months x2 years, then yearly through year 5, also Urology to consider cystoscopies and urine cytology  -will clarify with [facility] their preference for surveillance followup, if they are comfortable managing all of her surveillance, then she may be able to followup with Oncology as needed, otherwise I am happy to follow her CT scans    30 minutes were spent in patient discussion, coordination of care, record review, lab review, imaging review. The diagnosis, prognosis, and general treatment was explained and all questions answered.     Graeme Esquivel DO    [facility]  [facility]    This note was created using a voice-recognition transcribing system. Incorrect words or phrases may have been missed during proofreading. Please interpret accordingly.

## 2024-07-11 ENCOUNTER — MED REC SCAN ONLY (OUTPATIENT)
Dept: INTERNAL MEDICINE CLINIC | Facility: CLINIC | Age: 79
End: 2024-07-11

## 2024-07-17 ENCOUNTER — HOSPITAL ENCOUNTER (OUTPATIENT)
Dept: GENERAL RADIOLOGY | Facility: HOSPITAL | Age: 79
Discharge: HOME | End: 2024-07-17
Attending: UROLOGY
Payer: MEDICARE

## 2024-07-17 DIAGNOSIS — C68.9 UROTHELIAL CARCINOMA (HCC): ICD-10-CM

## 2024-07-17 PROCEDURE — 51600 INJECTION FOR BLADDER X-RAY: CPT | Performed by: UROLOGY

## 2024-07-17 PROCEDURE — 74430 CONTRAST X-RAY BLADDER: CPT | Performed by: UROLOGY

## 2024-07-22 NOTE — CONSULTS
[facility]    PATIENT'S NAME: DIGNA SWARTZ   ATTENDING PHYSICIAN: Adriane Torres MD   CONSULTING PHYSICIAN: Alexa Beckman MD   PATIENT ACCOUNT#:   [id number]    LOCATION:  [address] [place] [address]  MEDICAL RECORD #:   [id number]       YOB: 1945  ADMISSION DATE:       06/24/2024      CONSULT DATE:  06/24/2024    REPORT OF CONSULTATION      REASON FOR CONSULTATION:  Post robot-assisted laparoscopic right nephroureterectomy.    HISTORY OF PRESENT ILLNESS:  The patient is a 78-year-old  female with known diagnosis of right renal pelvis high-grade urothelial carcinoma with no evidence of distant metastasis, was evaluated by Urology and referred to Uro-oncology.  Scheduled today for above-mentioned procedure by Dr. Adriane Torres.  Postoperatively, transferred to PACU for further monitoring.    PAST MEDICAL HISTORY:  Right renal pelvic urothelial carcinoma high grade as outlined above.  No evidence of distant metastasis.  Also had a history of liver cirrhosis on imaging studies, diabetes mellitus type 2, hypertension, hyperlipidemia, gastroesophageal reflux disease, generalized osteoarthritis, chronic kidney disease stage 2 to 3, history of DVT, and diverticular gastrointestinal bleed.    PAST SURGICAL HISTORY:  Multiple cystoscopies, oophorectomy, and hysterectomy.    MEDICATIONS:  Please see medication reconciliation list.     ALLERGIES:  No known drug allergies.    SOCIAL HISTORY:  No tobacco, alcohol, or drug use.  Lives with her family.  Independent for basic activities of daily living.     FAMILY HISTORY:  Mother had kidney cancer.  Father had diabetes mellitus type 2.    REVIEW OF SYSTEMS:  Currently resting in bed, abdominal discomfort.  No chest pain.  No shortness of breath.  Other 12-point review of systems is negative.      PHYSICAL EXAMINATION:    GENERAL:  Alert and oriented to time, place, and person.  No acute distress.  VITAL SIGNS:  Temperature 97.5, pulse 73,  Writer met with daughter, PAULINE, Trina, in patient room. Daughter only wanting informational meeting at this time. Had questions about 02, TF and ABT's.    Informed Peg, TF, IV fluids and ABT's would be discontinued as hospice is about comfort and not prolonging life. This is a hard decision for her to make alone and would like time to talk with siblings before making decision.    Pt comfortable at this time and remains also on optiflow high flow 02 via nc.    Peg informed when decision is made can contact hospice via staff RN or MSW.    Will continue to follow    Claire Olivera RN   [facility] H[facility]ice Liaison  YASMINE,,W , T and F 8-6 PM, [phone]  Tuesday: [phone]   respiratory rate 10, blood pressure 128/58, pulse ox 100% on room air.    HEENT:  Atraumatic.  Oropharynx clear.  Moist mucous membranes.  Ears, nose normal.  Eyes:  Anicteric sclerae.  NECK:  Supple.  No lymphadenopathy.  Trachea midline.  Full range of motion.  LUNGS:  Clear to auscultation bilaterally.  Normal respiratory effort.  HEART:  Regular rate, rhythm.  S1 and S2 auscultated.  No murmur.  ABDOMEN:  Soft, nondistended.  Laparoscopic incisions right lower quadrant.  MICHAEL drain.  Hypoactive bowel sounds.  No tenderness.  EXTREMITIES:  No peripheral edema, clubbing, or cyanosis.  NEUROLOGIC:  Motor and sensory intact.     ASSESSMENT AND PLAN:    1.   Right renal pelvis high-grade urothelial carcinoma, status post robot-assisted laparoscopic right nephroureterectomy.  Pain control.  Monitor hemodynamic status.  DVT prophylaxis.  Full pathology report still pending.  2.   Diabetes mellitus type 2.  Monitor Accu-Cheks.  Apply sliding scale insulin.  Hold metformin.  3.   Essential hypertension.  Continue home medications and monitor.   4.   Chronic kidney disease stage 3.  Hold lisinopril and monitor kidney function.  5.   Hyperlipidemia.  Continue home medications.    Dictated By Alexa Beckman MD  d: 06/24/2024 16:05:45  t: 06/24/2024 17:32:12  Job [id number]  FB/

## 2024-09-03 DIAGNOSIS — E78.5 HYPERLIPIDEMIA, UNSPECIFIED HYPERLIPIDEMIA TYPE: ICD-10-CM

## 2024-09-03 RX ORDER — ATORVASTATIN CALCIUM 10 MG/1
10 TABLET, FILM COATED ORAL NIGHTLY
Qty: 90 TABLET | Refills: 1 | Status: SHIPPED | OUTPATIENT
Start: 2024-09-03

## 2024-09-03 NOTE — TELEPHONE ENCOUNTER
Patient called regarding prescription for     ATORVASTATIN 10 MG Oral Tab     She took her last one today.   Please send refill request to:    [id number] - [place], [place] - [address] AT [facility], [phone], [phone]      Patient also stated that she called multiple time regarding this.

## 2024-10-11 ENCOUNTER — TELEPHONE (OUTPATIENT)
Facility: CLINIC | Age: 79
End: 2024-10-11

## 2024-10-11 DIAGNOSIS — Z86.0100 HISTORY OF COLONIC POLYPS: Primary | ICD-10-CM

## 2024-10-17 NOTE — TELEPHONE ENCOUNTER
Patient awaiting call to schedule. Patient also wants to add that she had surgery on 6/24/24 to have a kidney removed and wants to ensure that she can proceed. Please call at [phone],thanks.

## 2024-10-23 RX ORDER — POLYETHYLENE GLYCOL 3350, SODIUM SULFATE ANHYDROUS, SODIUM BICARBONATE, SODIUM CHLORIDE, POTASSIUM CHLORIDE 236; 22.74; 6.74; 5.86; 2.97 G/4L; G/4L; G/4L; G/4L; G/4L
4 POWDER, FOR SOLUTION ORAL ONCE
Qty: 1 EACH | Refills: 0 | Status: SHIPPED | OUTPATIENT
Start: 2024-10-23 | End: 2024-10-23

## 2024-10-23 NOTE — TELEPHONE ENCOUNTER
Thanks all.    Recent visit Dr. William Panchal 7/8/2024 reviewed.  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  History of hypertension, diabetes, chronic kidney disease.    Following with Dr Lamonte Esquivel of Onc.    My previous EGD/colonoscopy exam report of 5/29/2019 reviewed.  Mild reflux esophagitis only on EGD examination.  Colonoscopy examination showed 6 mm adenomatous colon polyp.    Should be safe to perform colonoscopy examination in 3-5 months, next available opening.    GI schedulers -    Please call Ms Mcdaniel to schedule colonoscopy exam at [facility] ([facility])    BMI Readings from Last 1 Encounters:   07/09/24 27.44 kg/m²     MAC anesthesia     BP Readings from Last 5 Encounters:   07/09/24 129/39   07/08/24 112/74   06/28/24 108/53   06/10/24 126/74   05/07/24 107/38       Golytely (PEG) 4L bowel prep  Rx sent in to [facility]      DX = history adenomatous colon polyp    Medication instructions:    Hold metformin day of procedure  Hold sitagliptin day before and day of procedure if still taking.

## 2024-10-23 NOTE — TELEPHONE ENCOUNTER
Last Procedure, Date, MD:  5/29/19, Colonoscopy, Dr. Anaya,    Last Diagnosis: See impression   Recalled (mth/yrs): 5 years  Sedation Used Previously: MAC    Last Prep Used (if known):  Golytely  Quality Of Prep (if known): good  Anticoagulants: None  Diabetic Med's (PO/Injectables): Metformin 1000mg  Weight loss Med's: None  Iron/Herbal/Multivitamin Supplements (RX/OTC): yes  Marijuana/Vaping/CBD: None  Height & Weight: 4' 9' and 135lb  BMI:  Hx of Cardiac/CVA Issues/(MI/Stroke): None  Devices Pacemaker/Defibrillator/Stents: none  Respiratory Issues/Oxygen Use/NAVEEN/COPD: None  Issues w/ Anesthesia: None    Symptoms (Y/N): None  Symptoms Details:     Special Comments/Notes: patient seen by Dr. Panchal on 7/8/24. Has an upcoming appointment with him in December.  Patient had kidney removed on 6/24/24.     Please advise on orders and prep.     Thank you!     Operative Report signed by Blade Anaya MD at 5/29/2019  4:15 PM  Version 1 of 1  Author: Blade Anaya MD Service: Gastroenterology Author Type: Physician   Filed: 5/29/2019  4:15 PM Date of Service: 5/29/2019  3:20 PM Status: Signed   : Blade Anaya MD (Physician)   EGD AND COLONOSCOPY PROCEDURE REPORTS:     DATE OF PROCEDURE:  5/29/2019     PCP: TRINA MARCANO MD     PREOPERATIVE DIAGNOSIS:  occult blood in stool, GERD, L sided abdominal pain     POSTOPERATIVE DIAGNOSIS:  See impression.     SURGEON:  Blade Anaya M.D.     SEDATION:    MAC anesthesia provided by the Anesthesia Service.  MAC anesthesia requested due to anticipated intolerance of colonoscopy examination under safe doses conscious sedation medications.     EGD PROCEDURE:    After the nature and risks of EGD and colonoscopy examinations under MAC anesthesia were discussed with the patient and all questions answered, informed consent was obtained.  The patient was sedated as above.       Once sedated, the Olympus adult diagnostic gastroscope was  placed in the patient's mouth and advanced under direct visualization through the oropharynx into the esophagus, on down through the stomach and pylorus to the duodenal bulb and second portions of the duodenum.  Retroflexion was performed in the stomach.     EGD FINDINGS:    Esophagus and GE junction: Mild reflux esophagitis, irregularity and blistering of the Z line at the GE junction.  No abnormal mucosa.  Several biopsies taken of somewhat inflamed granular appearing mucosa.  Would likely recapturing some gastric cardia mucosa.     Stomach: Clear secretions present.  Normal stomach and proximal and distal views gastric cardia fundus body incisura and antrum.     Duodenum: Clear secretions present.  Normal duodenal bulb and second portion duodenum.     COLONOSCOPY PROCEDURE:    The patient was repositioned for colonoscopy examination.  Additional sedation given.  Digital rectal exam was performed, which showed no masses.  The Olympus pediatric video colonoscope was advanced under direct visualization through the entire length of the colon to the cecum and terminal ileum.  Unable to retroflex in the ascending colon or cecum today due to tethering, sharp angulations, peristalsis.  Cecum was confirmed by landmarks, including appendiceal orifice, cecal strap, ileocecal valve.  Retroflexion was performed in the rectum.     The quality of the prep was good.  Exam of a ascending and descending, sigmoid colon limited by fairly extreme peristalsis, spasms.     COLONOSCOPY FINDINGS:  Severe diverticulosis descending and sigmoid colon as well as ascending colon.  Single sessile 6 mm polyp removed from the sigmoid colon by cold snare polypectomy, suctioned out.  Small internal hemorrhoids.     IMPRESSION:  Mild reflux esophagitis only on EGD examination.  Normal stomach and duodenum.  Severe colonic diverticulosis ascending, descending, sigmoid colon.  6 mm sigmoid colon polyp removed as above.     RECOMMENDATIONS:  Followup  above GE junction biopsy results.  Follow-up above colon polyp pathology results.  High-fiber diet.  Repeat colonoscopy examination in 5 years, or as per family history moving forward.

## 2024-10-24 NOTE — TELEPHONE ENCOUNTER
Left message for patient to call back to schedule Colonoscopy.     Please transfer call to GI surgery scheduling

## 2024-10-25 NOTE — TELEPHONE ENCOUNTER
Scheduled for:  Colonoscopy [id number]  Provider Name:    Date:  Friday, 04/04/2025  Location:  [facility]  Sedation:  MAC  Time:  2pm (pt is aware Endo will call with arrival time     Prep:  Golytely  Meds/Allergies Reconciled?:  Yes    Diagnosis with codes:   history adenomatous colon polyp Z86.010  Was patient informed to call insurance with codes (Y/N): Yes      Referral sent?:  Referral was sent at the time of electronic surgical scheduling.    EM or [facility] notified?:  I sent an electronic request to Endo Scheduling and received a confirmation today.       Medication Orders:  Hold metformin day of procedure  Hold sitagliptin day before and day of procedure if still taking.  Misc Orders:  None     Further instructions given by staff:  I discussed the prep instructions with the patient which she verbally understood and is aware that I will mail the instructions today

## 2024-11-18 DIAGNOSIS — E11.9 TYPE 2 DIABETES MELLITUS WITHOUT COMPLICATION, WITHOUT LONG-TERM CURRENT USE OF INSULIN (HCC): ICD-10-CM

## 2024-11-18 NOTE — TELEPHONE ENCOUNTER
Patient walked in to request a refill of the following medications:    isosorbide mononitrate ER 30 MG Oral Tablet 24 Hr     metFORMIN HCl 1000 MG Oral Tab     Patient was last seen in the office on 07/08/2024 and has a future appointment for 12/30/2024.    If approved, please send request to:    [id number] - [place], [place] - [address] AT [facility], [phone], [phone]     Any questions or concerns, please call patient at:    [phone]     KG

## 2024-11-19 RX ORDER — ISOSORBIDE MONONITRATE 30 MG/1
30 TABLET, EXTENDED RELEASE ORAL DAILY
Qty: 90 TABLET | Refills: 0 | Status: SHIPPED | OUTPATIENT
Start: 2024-11-19

## 2024-11-19 NOTE — TELEPHONE ENCOUNTER
Last OV:7-8-24  Last refill:7-20-23, 3-23-24     Next Appt: With Internal Medicine (William Panchal MD)  12/30/2024 at 10:00 AM

## 2024-12-09 ENCOUNTER — HOSPITAL ENCOUNTER (OUTPATIENT)
Dept: CT IMAGING | Facility: HOSPITAL | Age: 79
Discharge: HOME | End: 2024-12-09
Attending: UROLOGY
Payer: MEDICARE

## 2024-12-09 DIAGNOSIS — C68.9 UROTHELIAL CARCINOMA (HCC): ICD-10-CM

## 2024-12-09 LAB
CREAT BLD-MCNC: 1.8 MG/DL
EGFRCR SERPLBLD CKD-EPI 2021: 29 ML/MIN/1.73M2 (ref 60–?)

## 2024-12-09 PROCEDURE — 82565 ASSAY OF CREATININE: CPT

## 2024-12-09 PROCEDURE — 74178 CT ABD&PLV WO CNTR FLWD CNTR: CPT | Performed by: UROLOGY

## 2024-12-18 ENCOUNTER — MED REC SCAN ONLY (OUTPATIENT)
Dept: INTERNAL MEDICINE CLINIC | Facility: CLINIC | Age: 79
End: 2024-12-18

## 2024-12-30 ENCOUNTER — OFFICE VISIT (OUTPATIENT)
Dept: INTERNAL MEDICINE CLINIC | Facility: CLINIC | Age: 79
End: 2024-12-30
Payer: COMMERCIAL

## 2024-12-30 VITALS
DIASTOLIC BLOOD PRESSURE: 80 MMHG | TEMPERATURE: 98 F | WEIGHT: 131 LBS | SYSTOLIC BLOOD PRESSURE: 128 MMHG | HEART RATE: 68 BPM | BODY MASS INDEX: 28.26 KG/M2 | HEIGHT: 57 IN | OXYGEN SATURATION: 98 %

## 2024-12-30 DIAGNOSIS — I50.9 HEART FAILURE, UNSPECIFIED HF CHRONICITY, UNSPECIFIED HEART FAILURE TYPE (HCC): ICD-10-CM

## 2024-12-30 DIAGNOSIS — Z00.00 HEALTH MAINTENANCE EXAMINATION: ICD-10-CM

## 2024-12-30 DIAGNOSIS — E11.3293 TYPE 2 DIABETES MELLITUS WITH BOTH EYES AFFECTED BY MILD NONPROLIFERATIVE RETINOPATHY WITHOUT MACULAR EDEMA, WITHOUT LONG-TERM CURRENT USE OF INSULIN (HCC): Primary | ICD-10-CM

## 2024-12-30 DIAGNOSIS — I10 ESSENTIAL HYPERTENSION: ICD-10-CM

## 2024-12-30 DIAGNOSIS — C68.9 UROTHELIAL CARCINOMA (HCC): ICD-10-CM

## 2024-12-30 DIAGNOSIS — N18.31 STAGE 3A CHRONIC KIDNEY DISEASE (HCC): Chronic | ICD-10-CM

## 2024-12-30 DIAGNOSIS — H26.9 CATARACT OF BOTH EYES, UNSPECIFIED CATARACT TYPE: ICD-10-CM

## 2024-12-30 DIAGNOSIS — H40.9 GLAUCOMA OF BOTH EYES, UNSPECIFIED GLAUCOMA TYPE: ICD-10-CM

## 2024-12-30 DIAGNOSIS — E78.5 HYPERLIPIDEMIA, UNSPECIFIED HYPERLIPIDEMIA TYPE: ICD-10-CM

## 2024-12-30 LAB — HEMOGLOBIN A1C: 5.6 % (ref 4.3–5.6)

## 2024-12-30 RX ORDER — ATORVASTATIN CALCIUM 10 MG/1
10 TABLET, FILM COATED ORAL NIGHTLY
Qty: 90 TABLET | Refills: 3 | Status: SHIPPED | OUTPATIENT
Start: 2024-12-30

## 2024-12-30 RX ORDER — ISOSORBIDE MONONITRATE 30 MG/1
30 TABLET, EXTENDED RELEASE ORAL DAILY
Qty: 90 TABLET | Refills: 3 | Status: SHIPPED | OUTPATIENT
Start: 2024-12-30

## 2024-12-30 NOTE — ASSESSMENT & PLAN NOTE
Cut back on metformin dosage  Monitor CMP GFR  If with CKD stage 4 may need to switch off metformin to alternative such as Jardiance.  Following with urology
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
## 2024-12-30 NOTE — PATIENT INSTRUCTIONS
PATIENT INSTRUCTIONS    Thank you for seeing me today, it was a pleasure taking care of you.  Please check out at the  and schedule a follow up appointment.  Return in about 6 months (around 6/30/2025) for medicare visit.  Please remember that the preferred anthony period for appointments is 5 minutes. This is to help maximize the amount of time that we can spend together at our visits.    Decrease metformin 500 mg twice daily  Fasting blood work - [facility]  If feet are bothersome, can try warm water soaks for 10 minutes at a time up to 2-3 times a day with neosporin afterward   If possible have eye doctor fax me report  Please have the doctor fax his/her note and examination to our office at [phone].  Get fasting blood work    Best,  Dr. Panchal        [facility] INFORMATION    Here are some lab locations available to you. Please call and make a lab appointment. Please note that some of the times and availabilities are subject to change. Please refer to the [facility] webpage for the most recent updates.    [place]  [address] [place] [address]  [phone]  Monday-Friday: 7:30 AM-3:30 PM  Saturday: 7:30 AM-12 PM    [place]  [address], [place] [address]  [phone]  Monday-Friday: 7:30 AM-3:30 PM    [place]  [address] [address]  [phone]  Monday-Friday: 8 AM-4 PM  Saturday: 8 AM-1 PM    [place]  [address] [place] [address]  [phone]  Monday-Friday: 7 AM-3 PM  Saturday: 7 AM-12 PM    [place]  [address], [place] [address] [phone]  Monday-Friday: 7 AM-3 PM  Saturday: 8 AM-1 PM    [place]  [address] [place] [address] [address], [place] [address] [phone]  Monday-Friday: 6:30 AM-4 PM  Saturday: 6:30 AM-12 PM    [facility]  [address], [place] [address]  [phone]  Monday-Friday: 7 AM-3 PM  Saturday: 7:30 AM-12:30  PM    [place] ([facility])  [address], [place] [address]  [phone]  Monday-Friday: 8 AM-4 PM  Saturday: 7 AM-12 PM

## 2024-12-30 NOTE — ASSESSMENT & PLAN NOTE
Status post robot-assisted scopic right nephroureterectomy  Following with urology and oncology now.

## 2024-12-30 NOTE — ASSESSMENT & PLAN NOTE
No evidence of infection of the toes.  Advised monitoring.  Can try warm water soaks and neosporin as needed if with signs of paronychia.  Follow up as needed

## 2024-12-30 NOTE — ASSESSMENT & PLAN NOTE
Diabetes overall controlled.  Metformin decrease to 500 mg twice daily  Monitor GFR, if persistently low may need to switch off metformin  Need to obtain diabetic eye report  Follow-up in 6 months

## 2025-01-09 DIAGNOSIS — E11.3293 TYPE 2 DIABETES MELLITUS WITH BOTH EYES AFFECTED BY MILD NONPROLIFERATIVE RETINOPATHY WITHOUT MACULAR EDEMA, WITHOUT LONG-TERM CURRENT USE OF INSULIN (HCC): Primary | ICD-10-CM

## 2025-01-09 LAB
ALBUMIN/GLOBULIN RATIO: 1.4 (CALC) (ref 1–2.5)
ALBUMIN: 4.2 G/DL (ref 3.6–5.1)
ALKALINE PHOSPHATASE: 144 U/L (ref 37–153)
ALT: 28 U/L (ref 6–29)
AST: 38 U/L (ref 10–35)
BILIRUBIN, TOTAL: 0.5 MG/DL (ref 0.2–1.2)
BUN/CREATININE RATIO: 22 (CALC) (ref 6–22)
BUN: 33 MG/DL (ref 7–25)
CALCIUM: 10.1 MG/DL (ref 8.6–10.4)
CARBON DIOXIDE: 29 MMOL/L (ref 20–32)
CHLORIDE: 101 MMOL/L (ref 98–110)
CHOL/HDLC RATIO: 2 (CALC)
CHOLESTEROL, TOTAL: 133 MG/DL
CREATININE: 1.47 MG/DL (ref 0.6–1)
EGFR: 37 ML/MIN/1.73M2
GLOBULIN: 3.1 G/DL (CALC) (ref 1.9–3.7)
GLUCOSE: 99 MG/DL (ref 65–99)
HDL CHOLESTEROL: 68 MG/DL
LDL-CHOLESTEROL: 45 MG/DL (CALC)
NON-HDL CHOLESTEROL: 65 MG/DL (CALC)
POTASSIUM: 4.5 MMOL/L (ref 3.5–5.3)
PROTEIN, TOTAL: 7.3 G/DL (ref 6.1–8.1)
SODIUM: 139 MMOL/L (ref 135–146)
TRIGLYCERIDES: 123 MG/DL

## 2025-01-09 RX ORDER — LANCETS
1 EACH MISCELLANEOUS DAILY
Qty: 100 EACH | Refills: 3 | Status: SHIPPED | OUTPATIENT
Start: 2025-01-09 | End: 2026-01-09

## 2025-01-09 RX ORDER — BLOOD-GLUCOSE METER
1 EACH MISCELLANEOUS DAILY
Qty: 1 KIT | Refills: 0 | Status: SHIPPED | OUTPATIENT
Start: 2025-01-09

## 2025-03-24 ENCOUNTER — LAB ENCOUNTER (OUTPATIENT)
Dept: LAB | Age: 80
End: 2025-03-24
Attending: STUDENT IN AN ORGANIZED HEALTH CARE EDUCATION/TRAINING PROGRAM
Payer: MEDICARE

## 2025-03-24 DIAGNOSIS — D64.9 ANEMIA, UNSPECIFIED TYPE: ICD-10-CM

## 2025-03-24 DIAGNOSIS — N18.30 STAGE 3 CHRONIC KIDNEY DISEASE, UNSPECIFIED WHETHER STAGE 3A OR 3B CKD (HCC): ICD-10-CM

## 2025-03-24 DIAGNOSIS — C65.1 MALIGNANT NEOPLASM OF RIGHT RENAL PELVIS (HCC): ICD-10-CM

## 2025-03-24 DIAGNOSIS — D50.8 OTHER IRON DEFICIENCY ANEMIA: ICD-10-CM

## 2025-03-24 DIAGNOSIS — C68.9 UROTHELIAL CARCINOMA (HCC): ICD-10-CM

## 2025-03-24 LAB
ALBUMIN SERPL-MCNC: 4.6 G/DL (ref 3.2–4.8)
ALBUMIN/GLOB SERPL: 1.4 {RATIO} (ref 1–2)
ALP LIVER SERPL-CCNC: 141 U/L
ALT SERPL-CCNC: 28 U/L
ANION GAP SERPL CALC-SCNC: 8 MMOL/L (ref 0–18)
AST SERPL-CCNC: 40 U/L (ref ?–34)
BASOPHILS # BLD AUTO: 0.06 X10(3) UL (ref 0–0.2)
BASOPHILS NFR BLD AUTO: 0.8 %
BILIRUB SERPL-MCNC: 0.4 MG/DL (ref 0.2–1.1)
BUN BLD-MCNC: 28 MG/DL (ref 9–23)
BUN/CREAT SERPL: 18.7 (ref 10–20)
CALCIUM BLD-MCNC: 9.7 MG/DL (ref 8.7–10.4)
CHLORIDE SERPL-SCNC: 103 MMOL/L (ref 98–112)
CO2 SERPL-SCNC: 28 MMOL/L (ref 21–32)
CREAT BLD-MCNC: 1.5 MG/DL
DEPRECATED HBV CORE AB SER IA-ACNC: 4 NG/ML
DEPRECATED RDW RBC AUTO: 43.7 FL (ref 35.1–46.3)
EGFRCR SERPLBLD CKD-EPI 2021: 36 ML/MIN/1.73M2 (ref 60–?)
EOSINOPHIL # BLD AUTO: 0.2 X10(3) UL (ref 0–0.7)
EOSINOPHIL NFR BLD AUTO: 2.8 %
ERYTHROCYTE [DISTWIDTH] IN BLOOD BY AUTOMATED COUNT: 13.7 % (ref 11–15)
GLOBULIN PLAS-MCNC: 3.3 G/DL (ref 2–3.5)
GLUCOSE BLD-MCNC: 91 MG/DL (ref 70–99)
HCT VFR BLD AUTO: 34.1 %
HGB BLD-MCNC: 10.7 G/DL
IMM GRANULOCYTES # BLD AUTO: 0.02 X10(3) UL (ref 0–1)
IMM GRANULOCYTES NFR BLD: 0.3 %
IRON SATN MFR SERPL: 11 %
IRON SERPL-MCNC: 51 UG/DL
LYMPHOCYTES # BLD AUTO: 2.77 X10(3) UL (ref 1–4)
LYMPHOCYTES NFR BLD AUTO: 39 %
MCH RBC QN AUTO: 27.5 PG (ref 26–34)
MCHC RBC AUTO-ENTMCNC: 31.4 G/DL (ref 31–37)
MCV RBC AUTO: 87.7 FL
MONOCYTES # BLD AUTO: 0.59 X10(3) UL (ref 0.1–1)
MONOCYTES NFR BLD AUTO: 8.3 %
NEUTROPHILS # BLD AUTO: 3.47 X10 (3) UL (ref 1.5–7.7)
NEUTROPHILS # BLD AUTO: 3.47 X10(3) UL (ref 1.5–7.7)
NEUTROPHILS NFR BLD AUTO: 48.8 %
OSMOLALITY SERPL CALC.SUM OF ELEC: 293 MOSM/KG (ref 275–295)
PLATELET # BLD AUTO: 361 10(3)UL (ref 150–450)
POTASSIUM SERPL-SCNC: 4.2 MMOL/L (ref 3.5–5.1)
PROT SERPL-MCNC: 7.9 G/DL (ref 5.7–8.2)
RBC # BLD AUTO: 3.89 X10(6)UL
SODIUM SERPL-SCNC: 139 MMOL/L (ref 136–145)
TOTAL IRON BINDING CAPACITY: 454 UG/DL (ref 250–425)
TRANSFERRIN SERPL-MCNC: 372 MG/DL (ref 250–380)
WBC # BLD AUTO: 7.1 X10(3) UL (ref 4–11)

## 2025-03-24 PROCEDURE — 83540 ASSAY OF IRON: CPT

## 2025-03-24 PROCEDURE — 85025 COMPLETE CBC W/AUTO DIFF WBC: CPT

## 2025-03-24 PROCEDURE — 80053 COMPREHEN METABOLIC PANEL: CPT

## 2025-03-24 PROCEDURE — 36415 COLL VENOUS BLD VENIPUNCTURE: CPT

## 2025-03-24 PROCEDURE — 84466 ASSAY OF TRANSFERRIN: CPT

## 2025-03-24 PROCEDURE — 82728 ASSAY OF FERRITIN: CPT

## 2025-04-02 ENCOUNTER — LAB ENCOUNTER (OUTPATIENT)
Dept: LAB | Age: 80
End: 2025-04-02
Attending: Other
Payer: MEDICARE

## 2025-04-02 DIAGNOSIS — N17.9 ACUTE KIDNEY INJURY: Primary | ICD-10-CM

## 2025-04-02 LAB
ANION GAP SERPL CALC-SCNC: 6 MMOL/L (ref 0–18)
BUN BLD-MCNC: 32 MG/DL (ref 9–23)
BUN/CREAT SERPL: 20.8 (ref 10–20)
CALCIUM BLD-MCNC: 9.5 MG/DL (ref 8.7–10.4)
CHLORIDE SERPL-SCNC: 104 MMOL/L (ref 98–112)
CO2 SERPL-SCNC: 29 MMOL/L (ref 21–32)
CREAT BLD-MCNC: 1.54 MG/DL
EGFRCR SERPLBLD CKD-EPI 2021: 35 ML/MIN/1.73M2 (ref 60–?)
FASTING STATUS PATIENT QL REPORTED: YES
GLUCOSE BLD-MCNC: 131 MG/DL (ref 70–99)
OSMOLALITY SERPL CALC.SUM OF ELEC: 297 MOSM/KG (ref 275–295)
POTASSIUM SERPL-SCNC: 4.4 MMOL/L (ref 3.5–5.1)
SODIUM SERPL-SCNC: 139 MMOL/L (ref 136–145)

## 2025-04-02 PROCEDURE — 80048 BASIC METABOLIC PNL TOTAL CA: CPT

## 2025-04-02 PROCEDURE — 36415 COLL VENOUS BLD VENIPUNCTURE: CPT

## 2025-06-16 DIAGNOSIS — K21.9 GASTROESOPHAGEAL REFLUX DISEASE, UNSPECIFIED WHETHER ESOPHAGITIS PRESENT: ICD-10-CM

## 2025-06-16 NOTE — TELEPHONE ENCOUNTER
Patient called the office, requesting a refill -    Pantoprazole 40 MG Oral Tab EC  Patient said she is out of the medication.    [id number] - [place], [place] - [address], [phone], [phone]   [address] [address]   Phone: [phone] Fax: [phone]   Hours: Not open 24 hours

## 2025-06-18 PROBLEM — Z85.9 HISTORY OF CANCER: Status: ACTIVE | Noted: 2025-06-18

## 2025-06-18 PROBLEM — C68.9 UROTHELIAL CARCINOMA (HCC): Status: RESOLVED | Noted: 2022-12-08 | Resolved: 2025-06-18

## 2025-06-18 RX ORDER — PANTOPRAZOLE SODIUM 40 MG/1
40 TABLET, DELAYED RELEASE ORAL
Qty: 90 TABLET | Refills: 0 | Status: SHIPPED | OUTPATIENT
Start: 2025-06-18

## 2025-06-18 NOTE — TELEPHONE ENCOUNTER
Refill passed per Clinic protocol.    Last prescribed 04/14/2023- patient is requesting refill please advise if refill is appropriate.     Requested Prescriptions   Pending Prescriptions Disp Refills    pantoprazole 40 MG Oral Tab EC 90 tablet 0     Sig: Take 1 tablet (40 mg total) by mouth before breakfast.       Gastrointestional Medication Protocol Passed - 6/18/2025  9:07 AM        Passed - In person appointment or virtual visit in the past 12 mos or appointment in next 3 mos     Recent Outpatient Visits              5 months ago Type 2 diabetes mellitus with both eyes affected by mild nonproliferative retinopathy without macular edema, without long-term current use of insulin ([facility])    [facility]al [facility]    Office Visit    11 months ago Urothelial carcinoma ([facility])    [facility] Hematology Oncology Graeme Esquivel DO    Office Visit    11 months ago [facility] carcinoma ([facility])    [facility] Hematology Oncology    Nurse Only    11 months ago Urothelial carcinoma ([facility])    [facility]anchal [facility]    Office Visit    1 year ago [facility]          Future Appointments         Provider Department Appt Notes    In 1 week William Panchal MD [facility] ma supervisit; last ma supervisit 02/07/2024   KG    In 2 months GRACE MORALES [facility] Colon w/Mac @[facility]    In 2 months Lorelei Cardenas MD [facility] kidney issues                    Passed - Medication is active on med list
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
## 2025-06-25 ENCOUNTER — OFFICE VISIT (OUTPATIENT)
Dept: INTERNAL MEDICINE CLINIC | Facility: CLINIC | Age: 80
End: 2025-06-25
Payer: COMMERCIAL

## 2025-06-25 VITALS
WEIGHT: 141 LBS | SYSTOLIC BLOOD PRESSURE: 124 MMHG | DIASTOLIC BLOOD PRESSURE: 80 MMHG | OXYGEN SATURATION: 99 % | HEIGHT: 57 IN | TEMPERATURE: 98 F | BODY MASS INDEX: 30.42 KG/M2 | HEART RATE: 70 BPM

## 2025-06-25 DIAGNOSIS — E66.811 CLASS 1 OBESITY DUE TO EXCESS CALORIES WITH SERIOUS COMORBIDITY AND BODY MASS INDEX (BMI) OF 30.0 TO 30.9 IN ADULT: ICD-10-CM

## 2025-06-25 DIAGNOSIS — E11.3293 TYPE 2 DIABETES MELLITUS WITH BOTH EYES AFFECTED BY MILD NONPROLIFERATIVE RETINOPATHY WITHOUT MACULAR EDEMA, WITHOUT LONG-TERM CURRENT USE OF INSULIN (HCC): ICD-10-CM

## 2025-06-25 DIAGNOSIS — E66.09 CLASS 1 OBESITY DUE TO EXCESS CALORIES WITH SERIOUS COMORBIDITY AND BODY MASS INDEX (BMI) OF 30.0 TO 30.9 IN ADULT: ICD-10-CM

## 2025-06-25 DIAGNOSIS — M15.0 PRIMARY OSTEOARTHRITIS INVOLVING MULTIPLE JOINTS: ICD-10-CM

## 2025-06-25 DIAGNOSIS — H40.9 GLAUCOMA OF BOTH EYES, UNSPECIFIED GLAUCOMA TYPE: ICD-10-CM

## 2025-06-25 DIAGNOSIS — Z00.00 HEALTH MAINTENANCE EXAMINATION: Primary | ICD-10-CM

## 2025-06-25 DIAGNOSIS — M85.80 OSTEOPENIA, UNSPECIFIED LOCATION: ICD-10-CM

## 2025-06-25 DIAGNOSIS — N39.0 URINARY TRACT INFECTION WITHOUT HEMATURIA, SITE UNSPECIFIED: ICD-10-CM

## 2025-06-25 DIAGNOSIS — K21.9 GASTROESOPHAGEAL REFLUX DISEASE, UNSPECIFIED WHETHER ESOPHAGITIS PRESENT: ICD-10-CM

## 2025-06-25 DIAGNOSIS — H26.9 CATARACT OF BOTH EYES, UNSPECIFIED CATARACT TYPE: ICD-10-CM

## 2025-06-25 DIAGNOSIS — N18.31 STAGE 3A CHRONIC KIDNEY DISEASE (HCC): Chronic | ICD-10-CM

## 2025-06-25 DIAGNOSIS — I50.9 HEART FAILURE, UNSPECIFIED HF CHRONICITY, UNSPECIFIED HEART FAILURE TYPE (HCC): ICD-10-CM

## 2025-06-25 DIAGNOSIS — D64.9 ANEMIA, UNSPECIFIED TYPE: ICD-10-CM

## 2025-06-25 DIAGNOSIS — I10 ESSENTIAL HYPERTENSION: ICD-10-CM

## 2025-06-25 DIAGNOSIS — K75.81 NASH (NONALCOHOLIC STEATOHEPATITIS): ICD-10-CM

## 2025-06-25 DIAGNOSIS — F32.5 MAJOR DEPRESSIVE DISORDER WITH SINGLE EPISODE, IN FULL REMISSION: ICD-10-CM

## 2025-06-25 DIAGNOSIS — E78.5 HYPERLIPIDEMIA, UNSPECIFIED HYPERLIPIDEMIA TYPE: ICD-10-CM

## 2025-06-25 DIAGNOSIS — Z85.9 HISTORY OF CANCER: ICD-10-CM

## 2025-06-25 LAB — HEMOGLOBIN A1C: 6 % (ref 4.3–5.6)

## 2025-06-25 RX ORDER — ASPIRIN 81 MG/1
81 TABLET ORAL DAILY
COMMUNITY

## 2025-06-25 RX ORDER — CEPHALEXIN 500 MG/1
500 CAPSULE ORAL EVERY 12 HOURS
COMMUNITY
Start: 2025-06-20

## 2025-06-25 NOTE — ASSESSMENT & PLAN NOTE
Exercise and diet advised.  Hepatitis B vaccine  COVID vaccine advised.  Advanced directive information provided.

## 2025-06-25 NOTE — ASSESSMENT & PLAN NOTE
Diabetes overall controlled.  Metformin 500 mg twice daily  Will continue to monitor GFR, if persistently low may need to switch off metformin  Need to obtain diabetic eye report  Follow-up in 6 months

## 2025-06-25 NOTE — PATIENT INSTRUCTIONS
PATIENT INSTRUCTIONS    Thank you for seeing me today, it was a pleasure taking care of you.  Please check out at the  and schedule a follow up appointment.  Return in about 6 months (around 12/25/2025) for follow up.  Please remember that the preferred anthony period for appointments is 5 minutes. This is to help maximize the amount of time that we can spend together at our visits.    The following imaging studies were ordered: None  Please also follow up with the following specialists: Urology, GI, nephrology, cardiology  Please fill out the advance directive information (power of  documents) and bring a copy to our clinic.  Healthy diet and exercise    Dr. Abdulkadir Jamison

## 2025-06-25 NOTE — ASSESSMENT & PLAN NOTE
Following with cardiology.  Blood pressures are currently controlled.  Continue metoprolol tartrate 25 mg daily.  Aspirin 81 mg daily  Has isosorbide as needed.  No chest pain at this time  Advise close follow up with cardiology

## 2025-06-25 NOTE — ASSESSMENT & PLAN NOTE
Monitoring at this time.  If with CKD stage 4 may need to switch off metformin to alternative such as Jardiance.  Following with urology  Has plans to see nephrology

## 2025-06-25 NOTE — ASSESSMENT & PLAN NOTE
Patient with postoperative anemia  Reports no further concerns for bleeding.  Has plans for colonoscopy with GI

## 2025-06-25 NOTE — ASSESSMENT & PLAN NOTE
History of osteoarthritis of her knees.   Suspect arthritis of the hands and feet as well.   Overall walking ok  Using Voltaren gel.  History of CKD.  Avoid oral NSAIDs.  Monitor

## 2025-06-25 NOTE — ASSESSMENT & PLAN NOTE
Mood is improved at this time.   Does notes intermittent anxiety at times  Doing very well now  Will continue to monitor.   Not interested in therapy  Follow-up as needed.

## 2025-06-26 LAB
CREATININE, RANDOM URINE: 72 MG/DL (ref 20–275)
MICROALBUMIN/CREATININE RATIO, RANDOM URINE: 17 MG/G CREAT
MICROALBUMIN: 1.2 MG/DL

## 2025-06-27 PROBLEM — F32.1 CURRENT MODERATE EPISODE OF MAJOR DEPRESSIVE DISORDER WITHOUT PRIOR EPISODE (HCC): Status: RESOLVED | Noted: 2025-06-27 | Resolved: 2025-06-27

## 2025-06-30 DIAGNOSIS — I50.9 HEART FAILURE, UNSPECIFIED HF CHRONICITY, UNSPECIFIED HEART FAILURE TYPE (HCC): ICD-10-CM

## 2025-07-01 PROBLEM — K74.60 CIRRHOSIS OF LIVER WITHOUT ASCITES (HCC): Status: RESOLVED | Noted: 2025-07-01 | Resolved: 2025-07-01

## 2025-07-03 RX ORDER — METOPROLOL TARTRATE 25 MG/1
25 TABLET, FILM COATED ORAL 2 TIMES DAILY
Qty: 180 TABLET | Refills: 3 | Status: SHIPPED | OUTPATIENT
Start: 2025-07-03

## 2025-07-03 NOTE — TELEPHONE ENCOUNTER
Please Review. Protocol Failed; No Protocol     GFR Evaluation  EGFRCR: 35 , resulted on 4/2/2025

## 2025-07-28 ENCOUNTER — HOSPITAL ENCOUNTER (OUTPATIENT)
Age: 80
Discharge: HOME | End: 2025-07-28
Payer: MEDICARE

## 2025-07-28 VITALS
HEART RATE: 73 BPM | RESPIRATION RATE: 18 BRPM | SYSTOLIC BLOOD PRESSURE: 156 MMHG | OXYGEN SATURATION: 95 % | DIASTOLIC BLOOD PRESSURE: 65 MMHG | TEMPERATURE: 99 F

## 2025-07-28 DIAGNOSIS — J01.90 ACUTE SINUSITIS, RECURRENCE NOT SPECIFIED, UNSPECIFIED LOCATION: Primary | ICD-10-CM

## 2025-07-28 LAB
S PYO AG THROAT QL: NEGATIVE
SARS-COV-2 RNA RESP QL NAA+PROBE: NOT DETECTED

## 2025-07-28 PROCEDURE — 99213 OFFICE O/P EST LOW 20 MIN: CPT | Performed by: PHYSICIAN ASSISTANT

## 2025-07-28 PROCEDURE — U0002 COVID-19 LAB TEST NON-CDC: HCPCS | Performed by: PHYSICIAN ASSISTANT

## 2025-07-28 PROCEDURE — 87880 STREP A ASSAY W/OPTIC: CPT | Performed by: PHYSICIAN ASSISTANT

## 2025-07-28 RX ORDER — DOXYCYCLINE 100 MG/1
100 CAPSULE ORAL 2 TIMES DAILY
Qty: 14 CAPSULE | Refills: 0 | Status: SHIPPED | OUTPATIENT
Start: 2025-07-28 | End: 2025-08-04

## 2025-08-14 ENCOUNTER — TELEPHONE (OUTPATIENT)
Facility: CLINIC | Age: 80
End: 2025-08-14

## 2025-08-22 ENCOUNTER — HOSPITAL ENCOUNTER (OUTPATIENT)
Facility: HOSPITAL | Age: 80
Setting detail: HOSPITAL OUTPATIENT SURGERY
Discharge: HOME | End: 2025-08-22
Attending: INTERNAL MEDICINE | Admitting: INTERNAL MEDICINE

## 2025-08-22 ENCOUNTER — ANESTHESIA EVENT (OUTPATIENT)
Dept: ENDOSCOPY | Facility: HOSPITAL | Age: 80
End: 2025-08-22

## 2025-08-22 ENCOUNTER — ANESTHESIA (OUTPATIENT)
Dept: ENDOSCOPY | Facility: HOSPITAL | Age: 80
End: 2025-08-22

## 2025-08-22 VITALS
RESPIRATION RATE: 11 BRPM | HEART RATE: 56 BPM | BODY MASS INDEX: 30.2 KG/M2 | OXYGEN SATURATION: 100 % | DIASTOLIC BLOOD PRESSURE: 66 MMHG | SYSTOLIC BLOOD PRESSURE: 129 MMHG | WEIGHT: 140 LBS | HEIGHT: 57 IN

## 2025-08-22 DIAGNOSIS — Z86.0100 HISTORY OF COLONIC POLYPS: ICD-10-CM

## 2025-08-22 PROBLEM — K64.8 INTERNAL HEMORRHOIDS: Status: ACTIVE | Noted: 2025-08-22

## 2025-08-22 LAB — GLUCOSE BLDC GLUCOMTR-MCNC: 112 MG/DL (ref 70–99)

## 2025-08-22 PROCEDURE — 45385 COLONOSCOPY W/LESION REMOVAL: CPT | Performed by: INTERNAL MEDICINE

## 2025-08-22 RX ORDER — SODIUM CHLORIDE, SODIUM LACTATE, POTASSIUM CHLORIDE, CALCIUM CHLORIDE 600; 310; 30; 20 MG/100ML; MG/100ML; MG/100ML; MG/100ML
INJECTION, SOLUTION INTRAVENOUS CONTINUOUS
Status: DISCONTINUED | OUTPATIENT
Start: 2025-08-22 | End: 2025-08-22

## 2025-08-22 RX ORDER — LIDOCAINE HYDROCHLORIDE 10 MG/ML
INJECTION, SOLUTION EPIDURAL; INFILTRATION; INTRACAUDAL; PERINEURAL AS NEEDED
Status: DISCONTINUED | OUTPATIENT
Start: 2025-08-22 | End: 2025-08-22 | Stop reason: SURG

## 2025-08-22 RX ADMIN — SODIUM CHLORIDE, SODIUM LACTATE, POTASSIUM CHLORIDE, CALCIUM CHLORIDE: 600; 310; 30; 20 INJECTION, SOLUTION INTRAVENOUS at 14:10:00

## 2025-08-22 RX ADMIN — LIDOCAINE HYDROCHLORIDE 30 MG: 10 INJECTION, SOLUTION EPIDURAL; INFILTRATION; INTRACAUDAL; PERINEURAL at 13:38:00

## 2025-08-28 ENCOUNTER — OFFICE VISIT (OUTPATIENT)
Facility: CLINIC | Age: 80
End: 2025-08-28

## 2025-08-28 VITALS
WEIGHT: 143 LBS | HEART RATE: 57 BPM | DIASTOLIC BLOOD PRESSURE: 66 MMHG | BODY MASS INDEX: 31 KG/M2 | SYSTOLIC BLOOD PRESSURE: 112 MMHG

## 2025-08-28 DIAGNOSIS — I12.9 HYPERTENSIVE KIDNEY DISEASE WITH STAGE 3B CHRONIC KIDNEY DISEASE (HCC): ICD-10-CM

## 2025-08-28 DIAGNOSIS — E11.21 DIABETIC GLOMERULOPATHY (HCC): ICD-10-CM

## 2025-08-28 DIAGNOSIS — N18.32 STAGE 3B CHRONIC KIDNEY DISEASE (HCC): Primary | ICD-10-CM

## 2025-08-28 DIAGNOSIS — Z85.9 HISTORY OF CANCER: ICD-10-CM

## 2025-08-28 DIAGNOSIS — N18.32 HYPERTENSIVE KIDNEY DISEASE WITH STAGE 3B CHRONIC KIDNEY DISEASE (HCC): ICD-10-CM

## 2025-08-28 PROCEDURE — 3074F SYST BP LT 130 MM HG: CPT | Performed by: INTERNAL MEDICINE

## 2025-08-28 PROCEDURE — 1126F AMNT PAIN NOTED NONE PRSNT: CPT | Performed by: INTERNAL MEDICINE

## 2025-08-28 PROCEDURE — 1159F MED LIST DOCD IN RCRD: CPT | Performed by: INTERNAL MEDICINE

## 2025-08-28 PROCEDURE — 3078F DIAST BP <80 MM HG: CPT | Performed by: INTERNAL MEDICINE

## 2025-08-28 PROCEDURE — 99204 OFFICE O/P NEW MOD 45 MIN: CPT | Performed by: INTERNAL MEDICINE

## (undated) DIAGNOSIS — M23.91 INTERNAL DERANGEMENT OF RIGHT KNEE: Primary | ICD-10-CM

## (undated) DEVICE — VISUALIZATION SYSTEM: Brand: CLEARIFY

## (undated) DEVICE — SOLUTION  .9 1000ML BTL

## (undated) DEVICE — TIGERTAIL 5F FLXTIP 70CM

## (undated) DEVICE — POWDER HEMSTAT 3GM OXIDIZED REGENERATED CELOS

## (undated) DEVICE — COVER XR CASS W21XL40IN UNIV ADH MICROSHIELD

## (undated) DEVICE — KIT MFLD FOR SPEC COLL

## (undated) DEVICE — Device

## (undated) DEVICE — KIT CLEAN ENDOKIT 1.1OZ GOWNX2

## (undated) DEVICE — 6619 2 PTNT ISO SYS INCISE AREA&LT;(&GT;&&LT;)&GT;P: Brand: STERI-DRAPE™ IOBAN™ 2

## (undated) DEVICE — PENCIL ES BTTN SWCH W/ TIP HOLSTER E-Z CLN

## (undated) DEVICE — SNARE OPTMZ PLPCTM TRP

## (undated) DEVICE — SOLO FLEX HYBRID GUIDEWIRE .03

## (undated) DEVICE — SOL H2O 3000ML IRRIG

## (undated) DEVICE — SOLUTION  .9 3000ML

## (undated) DEVICE — SUCTION IRRIGATOR: Brand: ENDOWRIST

## (undated) DEVICE — SOLO HYBRID WIRE 35 FLEX STR

## (undated) DEVICE — SPONGE LAP 18X18IN WHT COT 4 PLY FLD STRUNG

## (undated) DEVICE — MEDI-VAC NON-CONDUCTIVE SUCTION TUBING: Brand: CARDINAL HEALTH

## (undated) DEVICE — VIAL ISOVUE 300 10X100ML

## (undated) DEVICE — SNARE 9MM 230CM 2.4MM EXACTO

## (undated) DEVICE — COLUMN DRAPE

## (undated) DEVICE — UNDYED BRAIDED (POLYGLACTIN 910), SYNTHETIC ABSORBABLE SUTURE: Brand: COATED VICRYL

## (undated) DEVICE — TISSUE RETRIEVAL SYSTEM: Brand: INZII RETRIEVAL SYSTEM

## (undated) DEVICE — TOWEL SURG OR 17X30IN BLUE

## (undated) DEVICE — CLIP INT L POLYMER LOK LIG HEM O LOK

## (undated) DEVICE — CANNULA SEAL

## (undated) DEVICE — UROLOGY DRAIN BAG

## (undated) DEVICE — GAMMEX® PI HYBRID SIZE 6, STERILE POWDER-FREE SURGICAL GLOVE, POLYISOPRENE AND NEOPRENE BLEND: Brand: GAMMEX

## (undated) DEVICE — ENDOSCOPIC VALVE WITH ADAPTER.: Brand: SURSEAL® II

## (undated) DEVICE — MEDI-VAC NON-CONDUCTIVE SUCTION TUBING 6MM X 1.8M (6FT.) L: Brand: CARDINAL HEALTH

## (undated) DEVICE — ENCORE® LATEX MICRO SIZE 7, STERILE LATEX POWDER-FREE SURGICAL GLOVE: Brand: ENCORE

## (undated) DEVICE — KIT,ANTI FOG,W/SPONGE & FLUID,SOFT PACK: Brand: MEDLINE

## (undated) DEVICE — YANKAUER SUCTION INSTRUMENT NO CONTROL VENT, BULB TIP, CLEAR: Brand: YANKAUER

## (undated) DEVICE — ZIPWIRE GUIDEWIRE .035X150 STR

## (undated) DEVICE — GAMMEX® PI HYBRID SIZE 7.5, STERILE POWDER-FREE SURGICAL GLOVE, POLYISOPRENE AND NEOPRENE BLEND: Brand: GAMMEX

## (undated) DEVICE — TUBING SCT CLR 6FT .25IN MDVC

## (undated) DEVICE — BLAKE SILICONE DRAIN, 15 FR ROUND, HUBLESS: Brand: BLAKE

## (undated) DEVICE — SOLUTION IRRIG 3000ML 0.9% NACL FLX CONT

## (undated) DEVICE — HYDROGEL COATED URETERAL DILATOR: Brand: NOTTINGHAM ONE-STEP

## (undated) DEVICE — URETEROSCOPIC BIOPSY FORCEPS: Brand: PIRANHA

## (undated) DEVICE — SLIM BODY SKIN STAPLER: Brand: APPOSE ULC

## (undated) DEVICE — 3M™ STERI-STRIP™ REINFORCED ADHESIVE SKIN CLOSURES, R1547, 1/2 IN X 4 IN (12 MM X 100 MM), 6 STRIPS/ENVELOPE: Brand: 3M™ STERI-STRIP™

## (undated) DEVICE — UNIVERSAL STAPLER: Brand: ENDO GIA ULTRA

## (undated) DEVICE — STERILE WATER 1000ML BTL

## (undated) DEVICE — NON-ADHERENT PAD PREPACK: Brand: TELFA

## (undated) DEVICE — KIT ENDO ORCAPOD 160/180/190

## (undated) DEVICE — TIGERTAIL 6F FLXTIP 70CM

## (undated) DEVICE — SNAP KOVER: Brand: UNBRANDED

## (undated) DEVICE — SYRINGE MED 10ML LL TIP W/O SFTY DISP

## (undated) DEVICE — TROCAR: Brand: KII FIOS FIRST ENTRY

## (undated) DEVICE — SLEEVE KENDALL SCD EXPRESS MED

## (undated) DEVICE — CONTAINER GENT-L-KARE 4OZ GRAY

## (undated) DEVICE — ARM DRAPE

## (undated) DEVICE — ABSORBABLE WOUND CLOSURE DEVICE: Brand: V-LOC 90

## (undated) DEVICE — APPLICATOR ENDOSCP FOR ADJUNCTIVE HEMSTAS

## (undated) DEVICE — Device: Brand: DEFENDO AIR/WATER/SUCTION AND BIOPSY VALVE

## (undated) DEVICE — PROGRASP FORCEPS: Brand: ENDOWRIST

## (undated) DEVICE — WATER STERILE AQUALITE 1000ML

## (undated) DEVICE — SUCTION CANISTER, 3000CC,SAFELINER: Brand: DEROYAL

## (undated) DEVICE — SOL H2O IRRIGATION 3000ML

## (undated) DEVICE — MOSES 200 FIBER

## (undated) DEVICE — PAD EYE OVAL LG

## (undated) DEVICE — INSUFFLATION NEEDLE TO ESTABLISH PNEUMOPERITONEUM.: Brand: INSUFFLATION NEEDLE

## (undated) DEVICE — BANDAGE ROLL,100% COTTON, 6 PLY, LARGE: Brand: KERLIX

## (undated) DEVICE — DUAL LUMEN URETERAL CATHETER

## (undated) DEVICE — SOL NACL IRRIG 0.9% 1000ML BTL

## (undated) DEVICE — ADHESIVE SKIN TOP FOR WND CLSR DERMBND ADV

## (undated) DEVICE — SOLUTION IRRIG 1000ML 0.9% NACL USP BTL

## (undated) DEVICE — STERILE LATEX POWDER-FREE SURGICAL GLOVESWITH NITRILE COATING: Brand: PROTEXIS

## (undated) DEVICE — EVACUATOR SUR 100CC SIL BLB WND

## (undated) DEVICE — ANTIBACTERIAL UNDYED BRAIDED (POLYGLACTIN 910), SYNTHETIC ABSORBABLE SUTURE: Brand: COATED VICRYL

## (undated) DEVICE — SUT MCRYL 3-0 18IN PS-2 ABSRB UD 19MM 3/8 CIR

## (undated) DEVICE — SUT PERMA- 2-0 18IN FS NABSRB BLK 26MM 3/8

## (undated) DEVICE — FORCEP RADIAL JAW 4

## (undated) DEVICE — URETERAL ACCESS SHEATH SET: Brand: NAVIGATOR HD

## (undated) DEVICE — CYSTO PACK: Brand: MEDLINE INDUSTRIES, INC.

## (undated) DEVICE — TECH FEE

## (undated) DEVICE — ABSORBABLE HEMOSTAT (OXIDIZED REGENERATED CELLULOSE): Brand: SURGICEL

## (undated) DEVICE — MONOPOLAR CURVED SCISSORS: Brand: ENDOWRIST

## (undated) DEVICE — SYRINGE,TOOMEY,IRRIGATION,70CC,STERILE: Brand: MEDLINE

## (undated) DEVICE — DRAPE,ABDOMINAL,MAJOR,STERILE: Brand: MEDLINE

## (undated) DEVICE — ARTICULATION RELOAD WITH TRI-STAPLE TECHNOLOGY: Brand: ENDO GIA

## (undated) DEVICE — ENDOPATH 5MM ENDOSCOPIC BLUNT TIP DISSECTORS (12 POUCHES CONTAINING 3 DISSECTORS EACH): Brand: ENDOPATH

## (undated) DEVICE — ROBOTIC: Brand: MEDLINE INDUSTRIES, INC.

## (undated) DEVICE — GAUZE SPONGES: Brand: DEROYAL

## (undated) DEVICE — BIGOPSY BACKLOADING BIOPSY FORCEPS: Brand: BIGOPSY

## (undated) DEVICE — CATHETER,URETHRAL,REDRUBBER,STRL,12FR: Brand: MEDLINE INDUSTRIES, INC.

## (undated) DEVICE — SUT PDS II 0 L60IN ABSRB VLT L48MM CTX 1/2

## (undated) DEVICE — 3M™ IOBAN™ 2 ANTIMICROBIAL INCISE DRAPE 6650EZ: Brand: IOBAN™ 2

## (undated) DEVICE — PAD,EYE,LARGE,2 1/8"X2 5/8",STERILE,LF: Brand: MEDLINE

## (undated) DEVICE — INTELLIGENT RELOAD: Brand: TRI-STAPLE 2.0

## (undated) DEVICE — Device: Brand: CUSTOM PROCEDURE KIT

## (undated) DEVICE — SUT MCRYL 4-0 18IN PS-2 ABSRB UD 19MM 3/8 CIR

## (undated) DEVICE — TIP COVER ACCESSORY

## (undated) DEVICE — FENESTRATED BIPOLAR FORCEPS: Brand: ENDOWRIST

## (undated) NOTE — LETTER
3/29/2024    oTya Mcdaniel        [address] [place] [address]            Dear Toya Mcdaniel,      Our records indicate that you are due for an appointment for a Colonoscopy with Blade Anaya MD. Our doctors are booking out about 3-6 months in advance for procedures.     Please call our office to schedule a phone screening appointment to plan for the procedure(s).   Your medical well-being is important to us.    If your insurance requires a referral, please call your primary care office to request one.      Thank you,      The Physicians and Staff at [facility]

## (undated) NOTE — LETTER
[facility] 4W/SW/SE  [address]  [facility] [address]  [phone]    Blood Transfusion Consent    In the course of your treatment, it may become necessary to administer a transfusion of blood or blood components. This form provides basic information concerning this procedure and, if signed by you, authorizes its administration. By signing this form, you agree that all of your questions about the administration of blood or blood products have been answered by the ordering medical professional or designee.    Description of Procedure  Blood is introduced into one of your veins, commonly in the arm, using a sterilized disposable needle. The amount of blood transfused, and whether the transfusion will be of blood or blood components is a judgement the physician will make based on your particular needs.    Risks  The transfusion is a common procedure of low risk.  MINOR AND TEMPORARY REACTIONS ARE NOT UNCOMMON, including a slight bruise, swelling or local reaction in the area where the needle pierces your skin, or a nonserious reaction to the transfused material itself, including headache, fever or mild skin reaction, such as rash.  Serious reactions are possible, though very unlikely, and include severe allergic reaction (shock) and destruction (hemolysis) of transfused blood cells.  Infectious diseases which are known to be transmitted by blood transfusion include certain types of viral Hepatitis(liver infection from a virus), Human Immunodeficiency Virus (HIV-1,2) infection, a viral infection known to cause Acquired Immunodeficiency Syndrome (AIDS), as well as certain other bacterial, viral, and parasitic diseases. While a minimal risk of acquiring an infectious disease from transfused blood exists, in accordance with the Federal and State law, all due care has been taken in donor selection and testing to avoid transmission of disease.    Alternatives  If loss of blood poses serious threats during your  treatment, THERE IS NO EFFECTIVE ALTERNATIVE TO BLOOD TRANSFUSION. However, if you have any further questions on this matter, your provider will fully explain the alternatives to you if it has not already been done.    I, ______________________________, have read/had read to me the above. I understand the matters bearing on the decision whether or not to authorize a transfusion of blood or blood components. I have no questions which have not been answered to my full satisfaction. I hereby consent to such transfusion as my physician may deem necessary or advisable in the course of my treatment.    ______________________________________________                    ___________________________  (Signature of Patient or Responsible party in case of minor,                 (Printed Name of Patient or incompetent, or unconscious patient)              Responsible Party)    ___________________________               _____________________  (Relationship to Patient if not self)                                    (Date and Time)    __________________________                                                           ______________________              (Signature of Witness)               (Printed Name of Witness)     Language line ()    Telephone/Verbal/Video Consent    __________________________                     ____________________  (Signature of 2nd Witness           (Printed Name of 2nd  Telephone/Verbal/Video Consent)           Witness)    Patient Name: Toya Mcdaniel     : 1945                 Printed: 2024     Medical Record #: [id number]      Rev: 2023

## (undated) NOTE — LETTER
[address] [place] [address]      Authorization for Surgical Operation and Procedure     Date:___________                                                                                                         Time:__________  1. I hereby Anup Sanchez MD, my physician and his/her assistants (if applicable), which may include medical students, residents, and/or fellows, to perform the following surgical operation/ procedure and administer such anesthesia as may be determined necessary by my physician:  Operation/Procedure name (s) cystoscopy, right retrograde pyelogram, right ureteroscopy, laser lithitripsy, stone extraction, right stent placement  on Toya [id number] [facility]   2. I recognize that during the surgical operation/procedure, unforeseen conditions may necessitate additional or different procedures than those listed above. I, therefore, further authorize and request that the above-named surgeon, assistants, or designees perform such procedures as are, in their judgment, necessary and desirable. 3.   My surgeon/physician has discussed prior to my surgery the potential benefits, risks and side effects of this procedure; the likelihood of achieving goals; and potential problems that might occur during recuperation. They also discussed reasonable alternatives to the procedure, including risks, benefits, and side effects related to the alternatives and risks related to not receiving this procedure. I have had all my questions answered and I acknowledge that no guarantee has been made as to the result that may be obtained. 4.   Should the need arise during my operation or immediate post-operative period, I also consent to the administration of blood and/or blood products.   Further, I understand that despite careful testing and screening of blood or blood products by collecting agencies, I may still be subject to ill effects as a result of receiving a blood transfusion and/or blood products. The following are some, but not all, of the potential risks that can occur: fever and allergic reactions, hemolytic reactions, transmission of diseases such as Hepatitis, AIDS and Cytomegalovirus (CMV) and fluid overload. In the event that I wish to have an autologous transfusion of my own blood, or a directed donor transfusion. I will discuss this with my physician. 5.   I authorize the use of any specimen, organs, tissues, body parts or foreign objects that may be removed from my body during the operation/procedure for diagnosis, research or teaching purposes and their subsequent disposal by hospital authorities. I also authorize the release of specimen test results and/or written reports to my treating physician on the hospital medical staff or other referring or consulting physicians involved in my care, at the discretion of the Pathologist or my treating physician. 6.   I consent to the photographing or videotaping of the operations or procedures to be performed, including appropriate portions of my body for medical, scientific, or educational purposes, provided my identity is not revealed by the pictures or by descriptive texts accompanying them. If the procedure has been photographed/videotaped, the surgeon will obtain the original picture, image, videotape or CD. The hospital will not be responsible for storage, release or maintenance of the picture, image, tape or CD.    7.   I consent to the presence of a  or observers in the operating room as deemed necessary by my physician or their designees. 8.   I recognize that in the event my procedure results in extended X-Ray/fluoroscopy time, I may develop a skin reaction. 9.  If I have a Do Not Attempt Resuscitation (DNAR) order in place, that status will be suspended while in the operating room, procedural suite, and during the recovery period unless otherwise explicitly stated by me (or a person authorized to consent on my behalf). The surgeon or my attending physician will determine when the applicable recovery period ends for purposes of reinstating the DNAR order. 10. Patients having a sterilization procedure: I understand that if the procedure is successful the results will be permanent and it will therefore be impossible for me to inseminate, conceive, or bear children. I also understand that the procedure is intended to result in sterility, although the result has not been guaranteed. 11. I acknowledge that my physician has explained sedation/analgesia administration to me including the risk and benefits I consent to the administration of sedation/analgesia as may be necessary or desirable in the judgment of my physician. I CERTIFY THAT I HAVE READ AND FULLY UNDERSTAND THE ABOVE CONSENT TO OPERATION and/or OTHER PROCEDURE.    _________________________________________  __________________________________  Signature of Patient     Signature of Responsible Person         ___________________________________         Printed Name of Responsible Person           _________________________________                  Relationship to Patient  _________________________________________  ______________________________  Signature of Witness          Date  Time    STATEMENT OF PHYSICIAN My signature below affirms that prior to the time of the procedure; I have explained to the patient and/or his/her legal representative, the risks and benefits involved in the proposed treatment and any reasonable alternative to the proposed treatment. I have also explained the risks and benefits involved in refusal of the proposed treatment and alternatives to the proposed treatment and have answered the patient's questions.  If I have a significant financial interest in a co-management agreement or a significant financial interest in any product or implant, or other significant relationship used in this procedure/surgery, I have disclosed this and had a discussion with my patient.     _______________________________________________________________ _____________________________  Hernán Jones)                                                                                         (Date)                                   (Time)        Patient Name: Kori Pal    : 1945   Printed: 8/15/2022      Medical Record #: [id number]                                              Page 1 of 1

## (undated) NOTE — Clinical Note
Transitional Care Management call completed. A Transitional Care Management appointment is scheduled on 7/8/2024. A telephone encounter was sent to the office to report patient's left foot edema. Thank you.

## (undated) NOTE — Clinical Note
TCM call completed. A TCM-HFU appointment is scheduled for 6/23/2022. The patient denies any urinary symptoms and reported low back pain has been slowly improving daily. Thank you.

## (undated) NOTE — LETTER
01/20/22        Toya Mcdaniel  [address] [place] [address]      [facility] Mcdaniel,    Nuestros registros indican que tiene análisis clínicos pendientes y/o pruebas que se ordenaron en [place] nombre que no se palmer completado.   US ABDOMEN BROWN

## (undated) NOTE — LETTER
Hospital Discharge Documentation  Please phone to schedule a hospital follow up appointment. No discharge summary available at this time, below is the most recent progress note  for your review .         From: [address] [facility]  Phone: [phone] CVS:  S1S2 RRR no murmurs  LUNGS:  No audible wheezing  ABDOMEN: Non-distended  MUSCULOSKELETAL:  There was no deformity. There was full range of motion in all the extremities.    EXTREMITIES: There was no edema, clubbing or cyanosis  NEUROLOGICAL:  There with prominent mucosal thickening in the gastric fundus of the stomach stomach. Suspect Nonspecific gastritis/dyspepsia 5. S-shaped scoliosis dorsal lumbar spine.   Chronic anterior spondylolisthesis L5 relative to S1. 6. Moderate chronic wedge compression

## (undated) NOTE — LETTER
02/10/22        [place] [id number]      Dear S[facility] records indicate that you have outstanding lab work and or testing that was ordered for you and has not yet been completed:  Orders Placed This Encounter      MRI Knee Joint Right WO Contrast Augusto Sours    To provide you with the best possible care, please complete these orders at your earliest convenience. If you have recently completed these orders please disregard this letter. If you have any questions please call the office at Dept: [phone].      Thank you,       Adan Freeman MD

## (undated) NOTE — LETTER
[facility]  Administration of Anesthesia  1. Juan Pablo Navarro, or _________________________________ acting on her behalf, (Patient) (Dependent/Representative) request to receive anesthesia for my pending procedure/operation/treatment. infections, high spinal block, spinal bleeding, seizure, cardiac arrest and death. 7. AWARENESS: I understand that it is possible (but unlikely) to have explicit memory of events from the operating room while under general anesthesia.   8. ELECTROCONVULSIV unconscious pt /Relationship    My signature below affirms that prior to the time of the procedure, I have explained to the patient and/or his/her guardian, the risks and benefits of undergoing anesthesia, as well as any reasonable alternatives.     _______

## (undated) NOTE — LETTER
AUTHORIZATION FOR SURGICAL OPERATION OR OTHER PROCEDURE    1.  I hereby authorize Rickey Uribe PA-C, and [facility], [facility] staff assigned to my case to perform the following operation and/or procedure at the [facility], [facility]:    ________________________ Time:  ________ A. M.  P.M.        Patient Name:  ______________________________________________________  (please print)      Patient signature:  ___________________________________________________             Relationship to Patient:           []  Parent

## (undated) NOTE — LETTER
[address], [place]  Authorization for Invasive Procedures  1.  I hereby authorize Dr. Kearney Bosworth* , my physician and whomever may be designated as the doctor's assistant, to perform the following operation and/or procedure:  **COLO performed for the purposes of advancing medicine, science, and/or education, provided my identity is not revealed. If the procedure has been videotaped, the physician/surgeon will obtain the original videotape.  The hospital will not be responsible for stor My signature below affirms that prior to the time of the procedure, I have explained to the patient and/or her legal representative, the risks and benefits involved in the proposed treatment and any reasonable alternative to the proposed treatment.  I have

## (undated) NOTE — LETTER
6/30/2019              Toya Mcdaniel        [address] [place] [address]         Dear Ms. Mcdaniel,    As we discussed, your stomach endoscopy exam (\"EGD\") at [facility] on 5/29/2019 showed acid reflux (\"GERD\") irritatio

## (undated) NOTE — LETTER
10/25/21        [address] [id number]      Candace Console Mcdaniel,    Nuestros registros indican que tiene análisis clínicos pendientes y/o pruebas que se ordenaron en [place] nombre que no se palmer completado.      US ABDOMEN

## (undated) NOTE — LETTER
[address]  [facility] [place], [place], [place]  [address]  INFORMED CONSENT FOR TRANSFUSION OF BLOOD OR BLOOD PRODUCTS  My physician has informed me of the nature, purpose, benefits and risks of transfusion for blood and blood components that ______________________________________________  (Signature of Patient)                                                            (Responsible party in case of Minor,